# Patient Record
Sex: FEMALE | Race: BLACK OR AFRICAN AMERICAN | NOT HISPANIC OR LATINO | Employment: UNEMPLOYED | URBAN - METROPOLITAN AREA
[De-identification: names, ages, dates, MRNs, and addresses within clinical notes are randomized per-mention and may not be internally consistent; named-entity substitution may affect disease eponyms.]

---

## 2017-11-27 ENCOUNTER — HOSPITAL ENCOUNTER (EMERGENCY)
Facility: OTHER | Age: 27
Discharge: HOME | End: 2017-11-27
Attending: EMERGENCY MEDICINE
Payer: MEDICAID

## 2017-11-27 VITALS
HEIGHT: 62 IN | OXYGEN SATURATION: 99 % | RESPIRATION RATE: 18 BRPM | WEIGHT: 180 LBS | SYSTOLIC BLOOD PRESSURE: 112 MMHG | BODY MASS INDEX: 33.13 KG/M2 | HEART RATE: 92 BPM | DIASTOLIC BLOOD PRESSURE: 69 MMHG | TEMPERATURE: 98 F

## 2017-11-27 DIAGNOSIS — R55 VASOVAGAL SYNCOPE: ICD-10-CM

## 2017-11-27 DIAGNOSIS — R40.20 LOC (LOSS OF CONSCIOUSNESS): ICD-10-CM

## 2017-11-27 LAB
ALBUMIN SERPL BCP-MCNC: 4 G/DL
ALP SERPL-CCNC: 54 U/L
ALT SERPL W/O P-5'-P-CCNC: 11 U/L
ANION GAP SERPL CALC-SCNC: 7 MMOL/L
AST SERPL-CCNC: 11 U/L
B-HCG UR QL: NEGATIVE
BASOPHILS # BLD AUTO: 0.02 K/UL
BASOPHILS NFR BLD: 0.3 %
BILIRUB SERPL-MCNC: 0.3 MG/DL
BILIRUB UR QL STRIP: NEGATIVE
BUN SERPL-MCNC: 22 MG/DL
CALCIUM SERPL-MCNC: 9.2 MG/DL
CHLORIDE SERPL-SCNC: 106 MMOL/L
CLARITY UR: CLEAR
CO2 SERPL-SCNC: 24 MMOL/L
COLOR UR: YELLOW
CREAT SERPL-MCNC: 0.9 MG/DL
CTP QC/QA: YES
DIFFERENTIAL METHOD: ABNORMAL
EOSINOPHIL # BLD AUTO: 0 K/UL
EOSINOPHIL NFR BLD: 0.5 %
ERYTHROCYTE [DISTWIDTH] IN BLOOD BY AUTOMATED COUNT: 13.4 %
EST. GFR  (AFRICAN AMERICAN): >60 ML/MIN/1.73 M^2
EST. GFR  (NON AFRICAN AMERICAN): >60 ML/MIN/1.73 M^2
GLUCOSE SERPL-MCNC: 85 MG/DL
GLUCOSE UR QL STRIP: NEGATIVE
HCG INTACT+B SERPL-ACNC: <1.2 MIU/ML
HCT VFR BLD AUTO: 36.6 %
HGB BLD-MCNC: 12.5 G/DL
HGB UR QL STRIP: NEGATIVE
KETONES UR QL STRIP: NEGATIVE
LEUKOCYTE ESTERASE UR QL STRIP: NEGATIVE
LYMPHOCYTES # BLD AUTO: 3.1 K/UL
LYMPHOCYTES NFR BLD: 39.6 %
MCH RBC QN AUTO: 32.1 PG
MCHC RBC AUTO-ENTMCNC: 34.2 G/DL
MCV RBC AUTO: 94 FL
MONOCYTES # BLD AUTO: 0.6 K/UL
MONOCYTES NFR BLD: 7.8 %
NEUTROPHILS # BLD AUTO: 4 K/UL
NEUTROPHILS NFR BLD: 51.5 %
NITRITE UR QL STRIP: NEGATIVE
PH UR STRIP: 7 [PH] (ref 5–8)
PLATELET # BLD AUTO: 172 K/UL
PMV BLD AUTO: 11.4 FL
POCT GLUCOSE: 68 MG/DL (ref 70–110)
POTASSIUM SERPL-SCNC: 4.3 MMOL/L
PROT SERPL-MCNC: 7.4 G/DL
PROT UR QL STRIP: NEGATIVE
RBC # BLD AUTO: 3.9 M/UL
SODIUM SERPL-SCNC: 137 MMOL/L
SP GR UR STRIP: <=1.005 (ref 1–1.03)
URN SPEC COLLECT METH UR: ABNORMAL
UROBILINOGEN UR STRIP-ACNC: NEGATIVE EU/DL
WBC # BLD AUTO: 7.71 K/UL

## 2017-11-27 PROCEDURE — 80053 COMPREHEN METABOLIC PANEL: CPT

## 2017-11-27 PROCEDURE — 81003 URINALYSIS AUTO W/O SCOPE: CPT

## 2017-11-27 PROCEDURE — 96360 HYDRATION IV INFUSION INIT: CPT

## 2017-11-27 PROCEDURE — 99283 EMERGENCY DEPT VISIT LOW MDM: CPT | Mod: 25

## 2017-11-27 PROCEDURE — 85025 COMPLETE CBC W/AUTO DIFF WBC: CPT

## 2017-11-27 PROCEDURE — 84702 CHORIONIC GONADOTROPIN TEST: CPT

## 2017-11-27 PROCEDURE — 82962 GLUCOSE BLOOD TEST: CPT

## 2017-11-27 PROCEDURE — 93005 ELECTROCARDIOGRAM TRACING: CPT

## 2017-11-27 PROCEDURE — 93010 ELECTROCARDIOGRAM REPORT: CPT | Mod: ,,, | Performed by: INTERNAL MEDICINE

## 2017-11-27 PROCEDURE — 81025 URINE PREGNANCY TEST: CPT | Performed by: EMERGENCY MEDICINE

## 2017-11-27 PROCEDURE — 25000003 PHARM REV CODE 250: Performed by: EMERGENCY MEDICINE

## 2017-11-27 RX ADMIN — SODIUM CHLORIDE 1000 ML: 0.9 INJECTION, SOLUTION INTRAVENOUS at 01:11

## 2017-11-27 NOTE — ED PROVIDER NOTES
"Encounter Date: 11/27/2017    SCRIBE #1 NOTE: I, Ericklaci Muna, am scribing for, and in the presence of, Dr. Mondragon.       History     Chief Complaint   Patient presents with    Loss of Consciousness     Patient was at work making a Big Mac and the next thing she remembers she woke up on the floor.  Patient c/o a headache and back pain     Time seen by provider: 2:51 AM    This is a 27 y.o. female who presents with complaint of  LOC that occurred PTA. 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 She reports "feeling hot" just prior to the event. She denies prodromal chest pain, palpitations, or headache. She currently feels well. She denies recent illness, change in appetite, GI/URI/ symptoms  Or additional complaints. She notes previous syncopal episodes during the summertime. She admits to not drinking or eating much today while at work.    Additional past medical, surgical, and social history as outlined in the nursing assessment was reviewed by me.      The history is provided by the patient.     Review of patient's allergies indicates:   Allergen Reactions    Codeine      History reviewed. No pertinent past medical history.  History reviewed. No pertinent surgical history.  No family history on file.  Social History   Substance Use Topics    Smoking status: Never Smoker    Smokeless tobacco: Not on file    Alcohol use No     Review of Systems   Constitutional: Negative for appetite change, chills and fever.   HENT: Negative for congestion, rhinorrhea and sore throat.    Respiratory: Negative for cough and shortness of breath.    Cardiovascular: Negative for chest pain.   Gastrointestinal: Negative for abdominal pain, diarrhea, nausea and vomiting.   Endocrine: Negative for polyuria.   Genitourinary: Negative for decreased urine volume, difficulty urinating and dysuria.   Musculoskeletal: Negative for back pain.   Skin: Negative for rash.   Allergic/Immunologic: Negative for " immunocompromised state.   Neurological: Positive for syncope. Negative for dizziness and weakness.   Hematological: Does not bruise/bleed easily.   Psychiatric/Behavioral: Negative for confusion.       Physical Exam     Initial Vitals [11/27/17 0049]   BP Pulse Resp Temp SpO2   127/72 93 16 98.1 °F (36.7 °C) 99 %      MAP       90.33         Physical Exam    Constitutional: She appears well-developed and well-nourished. She is not diaphoretic. No distress.   HENT:   Head: Normocephalic and atraumatic. Head is without raccoon's eyes and without Dawn's sign.   Right Ear: External ear normal. No hemotympanum.   Left Ear: External ear normal. No hemotympanum.   Nose: No nasal deformity.   Mouth/Throat: Oropharynx is clear and moist.   Eyes: EOM are normal. Pupils are equal, round, and reactive to light. Right eye exhibits no discharge. Left eye exhibits no discharge.   Neck: Normal range of motion. Neck supple. No stridor present. No spinous process tenderness present. No tracheal deviation present.   Cardiovascular: Normal rate, regular rhythm, S1 normal, S2 normal, normal heart sounds and intact distal pulses. Exam reveals no gallop and no friction rub.    No murmur heard.  Pulmonary/Chest: Breath sounds normal. No respiratory distress. She has no wheezes. She has no rhonchi. She has no rales. She exhibits no tenderness.   Abdominal: Soft. Bowel sounds are normal. She exhibits no distension and no mass. There is no tenderness. There is no rebound and no guarding.   Musculoskeletal: Normal range of motion. She exhibits no edema or tenderness.   Normal range of motion. No edema or tenderness.    Lymphadenopathy:     She has no cervical adenopathy.   Neurological: She is alert and oriented to person, place, and time. She has normal strength. No cranial nerve deficit or sensory deficit. She exhibits normal muscle tone. Gait normal. GCS eye subscore is 4. GCS verbal subscore is 5. GCS motor subscore is 6.   Skin: Skin  is warm and dry. Capillary refill takes less than 2 seconds. No laceration and no rash noted. No pallor.   Psychiatric: Her speech is normal. Thought content normal.         ED Course   Procedures  Labs Reviewed   CBC W/ AUTO DIFFERENTIAL - Abnormal; Notable for the following:        Result Value    RBC 3.90 (*)     Hematocrit 36.6 (*)     MCH 32.1 (*)     All other components within normal limits   COMPREHENSIVE METABOLIC PANEL - Abnormal; Notable for the following:     BUN, Bld 22 (*)     Alkaline Phosphatase 54 (*)     Anion Gap 7 (*)     All other components within normal limits   URINALYSIS - Abnormal; Notable for the following:     Specific Gravity, UA <=1.005 (*)     All other components within normal limits   POCT GLUCOSE - Abnormal; Notable for the following:     POCT Glucose 68 (*)     All other components within normal limits   HCG, QUANTITATIVE, PREGNANCY   HCG, QUANTITATIVE, PREGNANCY   POCT URINE PREGNANCY     EKG Readings: (Independently Interpreted)   Normal sinus rhythm with a rate of 80 bpm. Baseline motion artifact. Non-specific T-wave changes.           Medical Decision Making:   Initial Assessment:   Patient presents after syncopal event. Her EKG shows no acute ischemia, lethal dysrhythmia, or conduction delay. I will check electrolytes, hemoglobin, and renal function. I will obtain orthostatics and give IV fluids. I will reassess.  Clinical Tests:   Lab Tests: Ordered  Medical Tests: Ordered and Reviewed  ED Management:  3:02 AM pt feels well. Her glucose was 68; she has no history of DM or prior bouts of hypoglycemia. She had no signs of neuroglycopenia and no glucose in her urine. I stressed the importance of hydration and not skipping meals. She wants to go home. I have discussed with patient the diagnostic results, diagnosis, treatment plan, and need for follow-up. Patient has expressed understanding of my instructions. I am comfortable with her discharge home at this time.                 Attending Attestation:           Physician Attestation for Scribe:  Physician Attestation Statement for Scribe #1: I, Dr. Mondragon , reviewed documentation, as scribed by Isela Toro  in my presence, and it is both accurate and complete.                 ED Course      Clinical Impression:     1. LOC (loss of consciousness)    2. Vasovagal syncope                                 Mackenzie Mondragon MD  11/28/17 2002

## 2017-11-27 NOTE — ED NOTES
Pt presents to the ed via EMS with c/o syncope. Pt reports she was at work making a [employer] when she felt a hotness come over her, then pt reports she woke ip on the floor. Pt denies any N/V, dizziness. Pt does report she is now experiencing mid back pain. Pain 4/10. Pt  Reports that she has passed out before, usually during the summer. Pt does not appear to be in in acute distress. Respirations are even and unlabored. Pt is placed on cont cardiac, bp and pulse ox monitoring. Bed is locked and in lowest position with side rails up x2. Call light is within reach.

## 2018-03-30 ENCOUNTER — HOSPITAL ENCOUNTER (EMERGENCY)
Facility: OTHER | Age: 28
Discharge: HOME | End: 2018-03-30
Attending: EMERGENCY MEDICINE
Payer: MEDICAID

## 2018-03-30 VITALS
WEIGHT: 171 LBS | RESPIRATION RATE: 17 BRPM | HEIGHT: 61 IN | HEART RATE: 96 BPM | TEMPERATURE: 99 F | DIASTOLIC BLOOD PRESSURE: 74 MMHG | BODY MASS INDEX: 32.28 KG/M2 | SYSTOLIC BLOOD PRESSURE: 124 MMHG | OXYGEN SATURATION: 100 %

## 2018-03-30 DIAGNOSIS — R11.2 NAUSEA AND VOMITING, INTRACTABILITY OF VOMITING NOT SPECIFIED, UNSPECIFIED VOMITING TYPE: ICD-10-CM

## 2018-03-30 DIAGNOSIS — E83.52 HYPERCALCEMIA: ICD-10-CM

## 2018-03-30 DIAGNOSIS — E86.0 DEHYDRATION: ICD-10-CM

## 2018-03-30 DIAGNOSIS — O21.0 HYPEREMESIS GRAVIDARUM, ANTEPARTUM: Primary | ICD-10-CM

## 2018-03-30 LAB
ALBUMIN SERPL BCP-MCNC: 4.8 G/DL
ALP SERPL-CCNC: 49 U/L
ALT SERPL W/O P-5'-P-CCNC: 24 U/L
ANION GAP SERPL CALC-SCNC: 15 MMOL/L
AST SERPL-CCNC: 21 U/L
B-HCG UR QL: POSITIVE
BACTERIA #/AREA URNS HPF: ABNORMAL /HPF
BASOPHILS # BLD AUTO: 0 K/UL
BASOPHILS NFR BLD: 0 %
BILIRUB SERPL-MCNC: 1 MG/DL
BILIRUB UR QL STRIP: ABNORMAL
BUN SERPL-MCNC: 13 MG/DL
CALCIUM SERPL-MCNC: 10.6 MG/DL
CHLORIDE SERPL-SCNC: 100 MMOL/L
CLARITY UR: ABNORMAL
CO2 SERPL-SCNC: 19 MMOL/L
COLOR UR: YELLOW
CREAT SERPL-MCNC: 0.7 MG/DL
CTP QC/QA: YES
DIFFERENTIAL METHOD: ABNORMAL
EOSINOPHIL # BLD AUTO: 0 K/UL
EOSINOPHIL NFR BLD: 0.1 %
ERYTHROCYTE [DISTWIDTH] IN BLOOD BY AUTOMATED COUNT: 12.4 %
EST. GFR  (AFRICAN AMERICAN): >60 ML/MIN/1.73 M^2
EST. GFR  (NON AFRICAN AMERICAN): >60 ML/MIN/1.73 M^2
GLUCOSE SERPL-MCNC: 81 MG/DL
GLUCOSE UR QL STRIP: NEGATIVE
HCT VFR BLD AUTO: 41.1 %
HGB BLD-MCNC: 14.8 G/DL
HGB UR QL STRIP: ABNORMAL
HYALINE CASTS #/AREA URNS LPF: 0 /LPF
KETONES UR QL STRIP: ABNORMAL
LEUKOCYTE ESTERASE UR QL STRIP: NEGATIVE
LYMPHOCYTES # BLD AUTO: 1.7 K/UL
LYMPHOCYTES NFR BLD: 24.5 %
MCH RBC QN AUTO: 32.2 PG
MCHC RBC AUTO-ENTMCNC: 36 G/DL
MCV RBC AUTO: 90 FL
MICROSCOPIC COMMENT: ABNORMAL
MONOCYTES # BLD AUTO: 0.5 K/UL
MONOCYTES NFR BLD: 6.6 %
NEUTROPHILS # BLD AUTO: 4.7 K/UL
NEUTROPHILS NFR BLD: 68.7 %
NITRITE UR QL STRIP: NEGATIVE
PH UR STRIP: 6 [PH] (ref 5–8)
PLATELET # BLD AUTO: 241 K/UL
PMV BLD AUTO: 10.6 FL
POTASSIUM SERPL-SCNC: 3.1 MMOL/L
PROT SERPL-MCNC: 8.7 G/DL
PROT UR QL STRIP: ABNORMAL
RBC # BLD AUTO: 4.59 M/UL
RBC #/AREA URNS HPF: 4 /HPF (ref 0–4)
SODIUM SERPL-SCNC: 134 MMOL/L
SP GR UR STRIP: >=1.03 (ref 1–1.03)
SQUAMOUS #/AREA URNS HPF: 69 /HPF
URN SPEC COLLECT METH UR: ABNORMAL
UROBILINOGEN UR STRIP-ACNC: NEGATIVE EU/DL
WBC # BLD AUTO: 6.83 K/UL
WBC #/AREA URNS HPF: 6 /HPF (ref 0–5)
YEAST URNS QL MICRO: ABNORMAL

## 2018-03-30 PROCEDURE — 25000003 PHARM REV CODE 250: Performed by: EMERGENCY MEDICINE

## 2018-03-30 PROCEDURE — 63600175 PHARM REV CODE 636 W HCPCS: Performed by: EMERGENCY MEDICINE

## 2018-03-30 PROCEDURE — 81000 URINALYSIS NONAUTO W/SCOPE: CPT

## 2018-03-30 PROCEDURE — 81025 URINE PREGNANCY TEST: CPT | Performed by: EMERGENCY MEDICINE

## 2018-03-30 PROCEDURE — 96365 THER/PROPH/DIAG IV INF INIT: CPT

## 2018-03-30 PROCEDURE — 80053 COMPREHEN METABOLIC PANEL: CPT

## 2018-03-30 PROCEDURE — 85025 COMPLETE CBC W/AUTO DIFF WBC: CPT

## 2018-03-30 PROCEDURE — S0028 INJECTION, FAMOTIDINE, 20 MG: HCPCS | Performed by: EMERGENCY MEDICINE

## 2018-03-30 PROCEDURE — 96375 TX/PRO/DX INJ NEW DRUG ADDON: CPT

## 2018-03-30 PROCEDURE — 96368 THER/DIAG CONCURRENT INF: CPT

## 2018-03-30 PROCEDURE — 99284 EMERGENCY DEPT VISIT MOD MDM: CPT | Mod: 25

## 2018-03-30 RX ORDER — DOXYLAMINE SUCCINATE AND PYRIDOXINE HYDROCHLORIDE, DELAYED RELEASE TABLETS 10 MG/10 MG 10; 10 MG/1; MG/1
2 TABLET, DELAYED RELEASE ORAL NIGHTLY
Qty: 30 TABLET | Refills: 0 | Status: ON HOLD | OUTPATIENT
Start: 2018-03-30 | End: 2018-04-10 | Stop reason: HOSPADM

## 2018-03-30 RX ORDER — ONDANSETRON 2 MG/ML
8 INJECTION INTRAMUSCULAR; INTRAVENOUS ONCE
Status: COMPLETED | OUTPATIENT
Start: 2018-03-30 | End: 2018-03-30

## 2018-03-30 RX ORDER — NITROFURANTOIN 25; 75 MG/1; MG/1
100 CAPSULE ORAL 2 TIMES DAILY
Qty: 10 CAPSULE | Refills: 0 | Status: SHIPPED | OUTPATIENT
Start: 2018-03-30 | End: 2018-03-30

## 2018-03-30 RX ORDER — DOXYLAMINE SUCCINATE AND PYRIDOXINE HYDROCHLORIDE, DELAYED RELEASE TABLETS 10 MG/10 MG 10; 10 MG/1; MG/1
2 TABLET, DELAYED RELEASE ORAL NIGHTLY
Qty: 30 TABLET | Refills: 0 | Status: SHIPPED | OUTPATIENT
Start: 2018-03-30 | End: 2018-03-30

## 2018-03-30 RX ORDER — PROMETHAZINE HYDROCHLORIDE 25 MG/1
25 SUPPOSITORY RECTAL EVERY 6 HOURS PRN
Qty: 12 SUPPOSITORY | Refills: 0 | Status: ON HOLD | OUTPATIENT
Start: 2018-03-30 | End: 2018-04-10 | Stop reason: HOSPADM

## 2018-03-30 RX ORDER — SUCRALFATE 1 G/10ML
1 SUSPENSION ORAL 4 TIMES DAILY
Qty: 1 BOTTLE | Refills: 0 | Status: ON HOLD | OUTPATIENT
Start: 2018-03-30 | End: 2018-04-10 | Stop reason: HOSPADM

## 2018-03-30 RX ORDER — DEXTROSE MONOHYDRATE, SODIUM CHLORIDE, AND POTASSIUM CHLORIDE 50; .745; 4.5 G/1000ML; G/1000ML; G/1000ML
1000 INJECTION, SOLUTION INTRAVENOUS
Status: COMPLETED | OUTPATIENT
Start: 2018-03-30 | End: 2018-03-30

## 2018-03-30 RX ORDER — NITROFURANTOIN 25; 75 MG/1; MG/1
100 CAPSULE ORAL 2 TIMES DAILY
Qty: 10 CAPSULE | Refills: 0 | Status: SHIPPED | OUTPATIENT
Start: 2018-03-30 | End: 2018-04-04

## 2018-03-30 RX ORDER — FAMOTIDINE 10 MG/ML
20 INJECTION INTRAVENOUS
Status: COMPLETED | OUTPATIENT
Start: 2018-03-30 | End: 2018-03-30

## 2018-03-30 RX ORDER — FAMOTIDINE 20 MG/1
10 TABLET, FILM COATED ORAL 2 TIMES DAILY PRN
Qty: 20 TABLET | Refills: 0 | Status: ON HOLD | OUTPATIENT
Start: 2018-03-30 | End: 2018-04-10 | Stop reason: HOSPADM

## 2018-03-30 RX ADMIN — SODIUM CHLORIDE 1000 ML: 0.9 INJECTION, SOLUTION INTRAVENOUS at 05:03

## 2018-03-30 RX ADMIN — DEXTROSE, SODIUM CHLORIDE, AND POTASSIUM CHLORIDE 1000 ML: 5; .45; .075 INJECTION INTRAVENOUS at 06:03

## 2018-03-30 RX ADMIN — PYRIDOXINE HYDROCHLORIDE 25 MG: 100 INJECTION, SOLUTION INTRAMUSCULAR; INTRAVENOUS at 06:03

## 2018-03-30 RX ADMIN — FAMOTIDINE 20 MG: 10 INJECTION INTRAVENOUS at 05:03

## 2018-03-30 RX ADMIN — ONDANSETRON HYDROCHLORIDE 8 MG: 2 INJECTION INTRAMUSCULAR; INTRAVENOUS at 05:03

## 2018-03-30 NOTE — ED NOTES
Pt rounding complete. Pt given warm blanket. Pt does not appear to be in acute distress. Respirations are even and unlabored. VSS. Bed is locked and in lowest position with side rails up x2. Call light is within reach. Will continue to monitor.

## 2018-03-30 NOTE — ED NOTES
Pt escorted to room 7 by triage nurse via wheelchair. Emesis noted to bag, clear/yellow with scant amount of dark blood.

## 2018-03-30 NOTE — ED NOTES
"Pt reports increased fatigue and weakness x 1 week. Initially bloody streaks in emesis "i thought it was just throwing up so much", "now just blood". Pt provided emesis bag in triage. Wretching but no active vomiting. Pt in direct view of triage nurse.   "

## 2018-03-30 NOTE — ED NOTES
Received report from FRAN Rodriguez, at bedside.  Pt is connected to NIBP cuff and pulse ox. Fluids infusing per MAR.   Bed in lowest position, side rails up x1, call light in reach, pt instructed to call for assistance.

## 2018-03-31 NOTE — ED NOTES
Pt presents with c/o emesis x2 weeks. Pt is 9 weeks pregnant. Pt denies any dizziness or chest pain. Pt is placed on cont cardiac, bp and pulse ox monitoring. Bed is locked and in lowest position with side rails up x2. Call light is within reach.

## 2018-04-06 ENCOUNTER — HOSPITAL ENCOUNTER (INPATIENT)
Facility: OTHER | Age: 28
LOS: 2 days | Discharge: HOME | End: 2018-04-10
Attending: EMERGENCY MEDICINE | Admitting: OBSTETRICS & GYNECOLOGY
Payer: MEDICAID

## 2018-04-06 DIAGNOSIS — O21.0 HYPEREMESIS GRAVIDARUM: Primary | ICD-10-CM

## 2018-04-06 DIAGNOSIS — R07.9 CHEST PAIN: ICD-10-CM

## 2018-04-06 PROBLEM — O20.9 VAGINAL BLEEDING AFFECTING EARLY PREGNANCY: Status: ACTIVE | Noted: 2018-04-06

## 2018-04-06 PROBLEM — Z34.91 FIRST TRIMESTER PREGNANCY: Status: ACTIVE | Noted: 2018-04-06

## 2018-04-06 PROBLEM — O23.41 UTI (URINARY TRACT INFECTION) DURING PREGNANCY, FIRST TRIMESTER: Status: ACTIVE | Noted: 2018-04-06

## 2018-04-06 PROBLEM — A59.9 TRICHIMONIASIS: Status: ACTIVE | Noted: 2018-04-06

## 2018-04-06 LAB
ANION GAP SERPL CALC-SCNC: 20 MMOL/L
ANION GAP SERPL CALC-SCNC: 7 MMOL/L
ANION GAP SERPL CALC-SCNC: 7 MMOL/L
B-OH-BUTYR BLD STRIP-SCNC: 3.8 MMOL/L
BACTERIA #/AREA URNS HPF: ABNORMAL /HPF
BILIRUB UR QL STRIP: NEGATIVE
BUN SERPL-MCNC: 16 MG/DL
BUN SERPL-MCNC: 22 MG/DL
BUN SERPL-MCNC: 9 MG/DL
C TRACH DNA SPEC QL NAA+PROBE: NOT DETECTED
CALCIUM SERPL-MCNC: 12 MG/DL
CALCIUM SERPL-MCNC: 8.7 MG/DL
CALCIUM SERPL-MCNC: 8.9 MG/DL
CHLORIDE SERPL-SCNC: 104 MMOL/L
CHLORIDE SERPL-SCNC: 112 MMOL/L
CHLORIDE SERPL-SCNC: 98 MMOL/L
CLARITY UR: CLEAR
CO2 SERPL-SCNC: 16 MMOL/L
CO2 SERPL-SCNC: 19 MMOL/L
CO2 SERPL-SCNC: 22 MMOL/L
COLOR UR: YELLOW
CREAT SERPL-MCNC: 0.7 MG/DL
CREAT SERPL-MCNC: 0.8 MG/DL
CREAT SERPL-MCNC: 0.9 MG/DL
EST. GFR  (AFRICAN AMERICAN): >60 ML/MIN/1.73 M^2
EST. GFR  (NON AFRICAN AMERICAN): >60 ML/MIN/1.73 M^2
GLUCOSE SERPL-MCNC: 105 MG/DL
GLUCOSE SERPL-MCNC: 317 MG/DL
GLUCOSE SERPL-MCNC: 96 MG/DL
GLUCOSE UR QL STRIP: ABNORMAL
HGB UR QL STRIP: ABNORMAL
KETONES UR QL STRIP: ABNORMAL
LEUKOCYTE ESTERASE UR QL STRIP: NEGATIVE
MAGNESIUM SERPL-MCNC: 2.2 MG/DL
MICROSCOPIC COMMENT: ABNORMAL
N GONORRHOEA DNA SPEC QL NAA+PROBE: NOT DETECTED
NITRITE UR QL STRIP: NEGATIVE
PH UR STRIP: 6 [PH] (ref 5–8)
PHOSPHATE SERPL-MCNC: 3.7 MG/DL
POTASSIUM SERPL-SCNC: 3 MMOL/L
POTASSIUM SERPL-SCNC: 3 MMOL/L
POTASSIUM SERPL-SCNC: 3.8 MMOL/L
PROT UR QL STRIP: NEGATIVE
RBC #/AREA URNS HPF: 4 /HPF (ref 0–4)
SODIUM SERPL-SCNC: 133 MMOL/L
SODIUM SERPL-SCNC: 134 MMOL/L
SODIUM SERPL-SCNC: 138 MMOL/L
SP GR UR STRIP: 1.01 (ref 1–1.03)
SQUAMOUS #/AREA URNS HPF: 6 /HPF
TRICHOMONAS UR QL MICRO: ABNORMAL
URN SPEC COLLECT METH UR: ABNORMAL
UROBILINOGEN UR STRIP-ACNC: NEGATIVE EU/DL
WBC #/AREA URNS HPF: 2 /HPF (ref 0–5)
YEAST URNS QL MICRO: ABNORMAL

## 2018-04-06 PROCEDURE — G0378 HOSPITAL OBSERVATION PER HR: HCPCS

## 2018-04-06 PROCEDURE — 63600175 PHARM REV CODE 636 W HCPCS: Performed by: EMERGENCY MEDICINE

## 2018-04-06 PROCEDURE — S0030 INJECTION, METRONIDAZOLE: HCPCS | Performed by: STUDENT IN AN ORGANIZED HEALTH CARE EDUCATION/TRAINING PROGRAM

## 2018-04-06 PROCEDURE — 25000003 PHARM REV CODE 250: Performed by: STUDENT IN AN ORGANIZED HEALTH CARE EDUCATION/TRAINING PROGRAM

## 2018-04-06 PROCEDURE — 63600175 PHARM REV CODE 636 W HCPCS: Performed by: OBSTETRICS & GYNECOLOGY

## 2018-04-06 PROCEDURE — 84100 ASSAY OF PHOSPHORUS: CPT

## 2018-04-06 PROCEDURE — 63600175 PHARM REV CODE 636 W HCPCS: Performed by: STUDENT IN AN ORGANIZED HEALTH CARE EDUCATION/TRAINING PROGRAM

## 2018-04-06 PROCEDURE — 81000 URINALYSIS NONAUTO W/SCOPE: CPT

## 2018-04-06 PROCEDURE — 87491 CHLMYD TRACH DNA AMP PROBE: CPT | Mod: 91

## 2018-04-06 PROCEDURE — 36415 COLL VENOUS BLD VENIPUNCTURE: CPT

## 2018-04-06 PROCEDURE — 83735 ASSAY OF MAGNESIUM: CPT

## 2018-04-06 PROCEDURE — 96375 TX/PRO/DX INJ NEW DRUG ADDON: CPT

## 2018-04-06 PROCEDURE — 86703 HIV-1/HIV-2 1 RESULT ANTBDY: CPT

## 2018-04-06 PROCEDURE — 86850 RBC ANTIBODY SCREEN: CPT

## 2018-04-06 PROCEDURE — 86592 SYPHILIS TEST NON-TREP QUAL: CPT

## 2018-04-06 PROCEDURE — 63700000 PHARM REV CODE 250 ALT 637 W/O HCPCS: Performed by: STUDENT IN AN ORGANIZED HEALTH CARE EDUCATION/TRAINING PROGRAM

## 2018-04-06 PROCEDURE — 80074 ACUTE HEPATITIS PANEL: CPT

## 2018-04-06 PROCEDURE — 99285 EMERGENCY DEPT VISIT HI MDM: CPT | Mod: 25

## 2018-04-06 PROCEDURE — 25000003 PHARM REV CODE 250: Performed by: EMERGENCY MEDICINE

## 2018-04-06 PROCEDURE — 87491 CHLMYD TRACH DNA AMP PROBE: CPT

## 2018-04-06 PROCEDURE — 96361 HYDRATE IV INFUSION ADD-ON: CPT

## 2018-04-06 PROCEDURE — 87086 URINE CULTURE/COLONY COUNT: CPT

## 2018-04-06 PROCEDURE — 80048 BASIC METABOLIC PNL TOTAL CA: CPT | Mod: 91

## 2018-04-06 PROCEDURE — 96365 THER/PROPH/DIAG IV INF INIT: CPT

## 2018-04-06 PROCEDURE — 82010 KETONE BODYS QUAN: CPT

## 2018-04-06 RX ORDER — POTASSIUM CHLORIDE 20 MEQ/1
40 TABLET, EXTENDED RELEASE ORAL
Status: COMPLETED | OUTPATIENT
Start: 2018-04-06 | End: 2018-04-06

## 2018-04-06 RX ORDER — METOCLOPRAMIDE HYDROCHLORIDE 5 MG/ML
10 INJECTION INTRAMUSCULAR; INTRAVENOUS
Status: COMPLETED | OUTPATIENT
Start: 2018-04-06 | End: 2018-04-06

## 2018-04-06 RX ORDER — CALCIUM CARBONATE 200(500)MG
500 TABLET,CHEWABLE ORAL 3 TIMES DAILY PRN
Status: DISCONTINUED | OUTPATIENT
Start: 2018-04-06 | End: 2018-04-10 | Stop reason: HOSPADM

## 2018-04-06 RX ORDER — ONDANSETRON 2 MG/ML
8 INJECTION INTRAMUSCULAR; INTRAVENOUS
Status: COMPLETED | OUTPATIENT
Start: 2018-04-06 | End: 2018-04-06

## 2018-04-06 RX ORDER — DIPHENHYDRAMINE HYDROCHLORIDE 50 MG/ML
25 INJECTION INTRAMUSCULAR; INTRAVENOUS EVERY 4 HOURS PRN
Status: DISCONTINUED | OUTPATIENT
Start: 2018-04-06 | End: 2018-04-10

## 2018-04-06 RX ORDER — AMOXICILLIN 250 MG
1 CAPSULE ORAL NIGHTLY PRN
Status: DISCONTINUED | OUTPATIENT
Start: 2018-04-06 | End: 2018-04-09

## 2018-04-06 RX ORDER — METOCLOPRAMIDE HYDROCHLORIDE 5 MG/ML
10 INJECTION INTRAMUSCULAR; INTRAVENOUS EVERY 6 HOURS PRN
Status: DISCONTINUED | OUTPATIENT
Start: 2018-04-06 | End: 2018-04-07

## 2018-04-06 RX ORDER — DEXTROSE MONOHYDRATE, SODIUM CHLORIDE, AND POTASSIUM CHLORIDE 50; 1.49; 9 G/1000ML; G/1000ML; G/1000ML
INJECTION, SOLUTION INTRAVENOUS CONTINUOUS
Status: DISCONTINUED | OUTPATIENT
Start: 2018-04-06 | End: 2018-04-10

## 2018-04-06 RX ORDER — SODIUM CHLORIDE 9 MG/ML
1000 INJECTION, SOLUTION INTRAVENOUS
Status: COMPLETED | OUTPATIENT
Start: 2018-04-06 | End: 2018-04-06

## 2018-04-06 RX ORDER — SIMETHICONE 80 MG
1 TABLET,CHEWABLE ORAL EVERY 6 HOURS PRN
Status: DISCONTINUED | OUTPATIENT
Start: 2018-04-06 | End: 2018-04-10 | Stop reason: HOSPADM

## 2018-04-06 RX ORDER — POTASSIUM CHLORIDE 7.45 MG/ML
10 INJECTION INTRAVENOUS
Status: DISPENSED | OUTPATIENT
Start: 2018-04-06 | End: 2018-04-06

## 2018-04-06 RX ORDER — DIPHENHYDRAMINE HYDROCHLORIDE 50 MG/ML
25 INJECTION INTRAMUSCULAR; INTRAVENOUS
Status: COMPLETED | OUTPATIENT
Start: 2018-04-06 | End: 2018-04-06

## 2018-04-06 RX ORDER — DIPHENHYDRAMINE HCL 25 MG
25 CAPSULE ORAL EVERY 4 HOURS PRN
Status: DISCONTINUED | OUTPATIENT
Start: 2018-04-06 | End: 2018-04-10 | Stop reason: HOSPADM

## 2018-04-06 RX ORDER — CEFTRIAXONE 1 G/1
1 INJECTION, POWDER, FOR SOLUTION INTRAMUSCULAR; INTRAVENOUS ONCE
Status: DISCONTINUED | OUTPATIENT
Start: 2018-04-06 | End: 2018-04-06

## 2018-04-06 RX ORDER — PRENATAL WITH FERROUS FUM AND FOLIC ACID 3080; 920; 120; 400; 22; 1.84; 3; 20; 10; 1; 12; 200; 27; 25; 2 [IU]/1; [IU]/1; MG/1; [IU]/1; MG/1; MG/1; MG/1; MG/1; MG/1; MG/1; UG/1; MG/1; MG/1; MG/1; MG/1
1 TABLET ORAL DAILY
Status: DISCONTINUED | OUTPATIENT
Start: 2018-04-06 | End: 2018-04-10 | Stop reason: HOSPADM

## 2018-04-06 RX ORDER — METRONIDAZOLE 500 MG/100ML
500 INJECTION, SOLUTION INTRAVENOUS
Status: COMPLETED | OUTPATIENT
Start: 2018-04-06 | End: 2018-04-06

## 2018-04-06 RX ADMIN — POTASSIUM CHLORIDE 10 MEQ: 10 INJECTION, SOLUTION INTRAVENOUS at 07:04

## 2018-04-06 RX ADMIN — DOXYLAMINE SUCCINATE 12.5 MG: 25 TABLET ORAL at 03:04

## 2018-04-06 RX ADMIN — METRONIDAZOLE 500 MG: 500 INJECTION, SOLUTION INTRAVENOUS at 06:04

## 2018-04-06 RX ADMIN — PYRIDOXINE HYDROCHLORIDE 25 MG: 100 INJECTION, SOLUTION INTRAMUSCULAR; INTRAVENOUS at 06:04

## 2018-04-06 RX ADMIN — METRONIDAZOLE 500 MG: 500 INJECTION, SOLUTION INTRAVENOUS at 05:04

## 2018-04-06 RX ADMIN — DEXTROSE AND SODIUM CHLORIDE 1000 ML: 5; .9 INJECTION, SOLUTION INTRAVENOUS at 11:04

## 2018-04-06 RX ADMIN — DEXTROSE AND SODIUM CHLORIDE 2000 ML: 5; .9 INJECTION, SOLUTION INTRAVENOUS at 09:04

## 2018-04-06 RX ADMIN — POTASSIUM CHLORIDE 10 MEQ: 10 INJECTION, SOLUTION INTRAVENOUS at 04:04

## 2018-04-06 RX ADMIN — PROMETHAZINE HYDROCHLORIDE 25 MG: 25 INJECTION INTRAMUSCULAR; INTRAVENOUS at 09:04

## 2018-04-06 RX ADMIN — SODIUM CHLORIDE 1000 ML: 0.9 INJECTION, SOLUTION INTRAVENOUS at 02:04

## 2018-04-06 RX ADMIN — CEFTRIAXONE 1 G: 1 INJECTION, SOLUTION INTRAVENOUS at 06:04

## 2018-04-06 RX ADMIN — POTASSIUM CHLORIDE 10 MEQ: 10 INJECTION, SOLUTION INTRAVENOUS at 06:04

## 2018-04-06 RX ADMIN — DOXYLAMINE SUCCINATE 12.5 MG: 25 TABLET ORAL at 09:04

## 2018-04-06 RX ADMIN — POTASSIUM CHLORIDE 10 MEQ: 10 INJECTION, SOLUTION INTRAVENOUS at 03:04

## 2018-04-06 RX ADMIN — DIPHENHYDRAMINE HYDROCHLORIDE 25 MG: 50 INJECTION, SOLUTION INTRAMUSCULAR; INTRAVENOUS at 09:04

## 2018-04-06 RX ADMIN — METRONIDAZOLE 500 MG: 500 INJECTION, SOLUTION INTRAVENOUS at 04:04

## 2018-04-06 RX ADMIN — METOCLOPRAMIDE 10 MG: 5 INJECTION, SOLUTION INTRAMUSCULAR; INTRAVENOUS at 09:04

## 2018-04-06 RX ADMIN — PRENATAL VIT W/ FE FUMARATE-FA TAB 27-0.8 MG 1 TABLET: 27-0.8 TAB at 03:04

## 2018-04-06 RX ADMIN — PYRIDOXINE HYDROCHLORIDE 25 MG: 100 INJECTION, SOLUTION INTRAMUSCULAR; INTRAVENOUS at 09:04

## 2018-04-06 RX ADMIN — DEXTROSE MONOHYDRATE, SODIUM CHLORIDE, AND POTASSIUM CHLORIDE: 50; 9; 1.49 INJECTION, SOLUTION INTRAVENOUS at 05:04

## 2018-04-06 RX ADMIN — POTASSIUM CHLORIDE 40 MEQ: 1500 TABLET, EXTENDED RELEASE ORAL at 02:04

## 2018-04-06 RX ADMIN — ONDANSETRON HYDROCHLORIDE 8 MG: 2 INJECTION, SOLUTION INTRAMUSCULAR; INTRAVENOUS at 02:04

## 2018-04-06 NOTE — ED NOTES
Pt is placed cont cardiac monitoring. Pt is AAOx3. RR are even and unlabored. Bed is locked and in lowest position with side rails up x2. Call light is within reach.

## 2018-04-06 NOTE — ASSESSMENT & PLAN NOTE
- On urine in ED - will discuss with patient  - Flagyl IV 2g  - GC/CT and HIV/RPR added on to urine/labs

## 2018-04-06 NOTE — SUBJECTIVE & OBJECTIVE
Obstetric History       T0      L0     SAB0   TAB0   Ectopic0   Multiple0   Live Births0       # Outcome Date GA Lbr Lan/2nd Weight Sex Delivery Anes PTL Lv   1 Current                 History reviewed. No pertinent past medical history.  History reviewed. No pertinent surgical history.      (Not in a hospital admission)    Review of patient's allergies indicates:   Allergen Reactions    Codeine         Family History     None        Social History Main Topics    Smoking status: Never Smoker    Smokeless tobacco: Not on file    Alcohol use No    Drug use: No    Sexual activity: Yes     Review of Systems   Constitutional: Positive for activity change, appetite change, fatigue and unexpected weight change. Negative for chills, diaphoresis and fever.   Respiratory: Negative for cough and shortness of breath.    Cardiovascular: Negative for chest pain.   Gastrointestinal: Positive for constipation, nausea and vomiting. Negative for abdominal pain, bloating and diarrhea.   Endocrine: Negative for diabetes.   Genitourinary: Positive for decreased libido. Negative for dyspareunia, flank pain, pelvic pain, vaginal bleeding, vaginal discharge, vaginal pain and vaginal odor.   Neurological: Positive for syncope. Negative for headaches.   Hematological: Does not bruise/bleed easily.   Breast: Positive for breast pain.     Objective:     Vital Signs (Most Recent):  Temp: 98 °F (36.7 °C) (18 1314)  Pulse: 100 (18 1241)  Resp: 18 (18 0819)  BP: (!) 150/90 (18 1241)  SpO2: 99 % (18 1241) Vital Signs (24h Range):  Temp:  [97.8 °F (36.6 °C)-98 °F (36.7 °C)] 98 °F (36.7 °C)  Pulse:  [] 100  Resp:  [18] 18  SpO2:  [97 %-99 %] 99 %  BP: (100-150)/(57-99) 150/90     Weight: 64 kg (141 lb)  Body mass index is 26.64 kg/m².    Physical Exam:   Constitutional: She is oriented to person, place, and time. She appears well-developed and well-nourished. No distress.    HENT:   Head:  Normocephalic and atraumatic.    Eyes: Conjunctivae and EOM are normal.    Neck: Neck supple.    Cardiovascular: Normal rate, regular rhythm and normal heart sounds.  Exam reveals no edema.     Pulmonary/Chest: Effort normal and breath sounds normal. No respiratory distress.        Abdominal: Soft. Bowel sounds are normal. She exhibits no distension. There is no tenderness. There is no rebound and no guarding.             Musculoskeletal: Normal range of motion.       Neurological: She is alert and oriented to person, place, and time.    Skin: Skin is warm and dry. She is not diaphoretic.    Psychiatric: She has a normal mood and affect. Her behavior is normal. Judgment and thought content normal.       Significant Labs:  No results found for: GROUPTRH, HEPBSAG, RUBELLAIGGSC, STREPBCULT, AFP, RLBVWHM4IX    BMP:   Recent Labs  Lab 04/06/18  0846 04/06/18  1236   GLU 96 317*   * 133*   K 3.8 3.0*   CL 98 104   CO2 16* 22*   BUN 22* 16   CREATININE 0.9 0.8   CALCIUM 12.0* 8.7   MG 2.2  --      CBC: No results for input(s): WBC, RBC, HGB, HCT, PLT, MCV, MCH, MCHC in the last 48 hours.    Recent Labs  Lab 04/06/18  1152   COLORU Yellow   SPECGRAV 1.010   PHUR 6.0   PROTEINUA Negative   BACTERIA Few*   NITRITE Negative   LEUKOCYTESUR Negative   UROBILINOGEN Negative     I have personallly reviewed all pertinent lab results from the last 24 hours.

## 2018-04-06 NOTE — ASSESSMENT & PLAN NOTE
- Diagnosed by OB at [facility] sx currently but was unable to tolerate PO medication  - Will treat with 1g rocephin IV x 1  - Will need ROGELIO

## 2018-04-06 NOTE — ED NOTES
Patient Identifiers for Daija Purvis checked and correct  LOC: The patient is awake, alert and aware of environment with an appropriate affect, the patient is oriented x 3 and speaking appropriate.  APPEARANCE: Patient resting comfortably and in no acute distress. The patient is clean and well groomed. The patient's clothing is properly fastened.  SKIN: The skin is warm and dry. The patient has normal skin turgor and moist mucus membranes. No rashes or lesions upon observation. Skin Intact , no breakdown noted.  Musculoskeletal :  Normal range of motion noted. Moves all extremities well, No swelling or tenderness noted  RESPIRATORY: Airway is open and patent, respirations are spontaneous, patient has a normal effort and rate. Breath sounds are clear & equal, bilaterally.  CARDIAC: Patient has a normal rate and rhythm, no peripheral edema noted, capillary refill < 3 seconds.   ABDOMEN: Soft and non tender to palpation, no distention observed.   PULSES: 2+ radial & pedal pulses, symmetrical in all extremities.  NEUROLOGIC: PERRL, Pupils mm and reacts briskly to light. Motor strength 5/5 all extremities.  The pt's facial expression is symmetrical, patient moving all extremities, normal sensation in all extremities when touched with a finger.The patient is awake, alert and cooperative with a calm affect, patient is aware of environment.      Will continue to monitor

## 2018-04-06 NOTE — ASSESSMENT & PLAN NOTE
- Pregnancy confirmed with + FHT at bedside in ED  - LMP and AUREA reported by patient from 1T US at [facility] not consistent

## 2018-04-06 NOTE — ED PROVIDER NOTES
"Encounter Date: 2018    SCRIBE #1 NOTE: I, Mihai Najera, am scribing for, and in the presence of, Dr. Mondragon.       History     Chief Complaint   Patient presents with    Emesis During Pregnancy     x3 weeks, pt reports passing blood clot yesterday, no bleeding currently. approx 10 weeks, , OB is Dr. Hurtado     8:51 AM    Patient is a 27 y.o. female,  approximately 10 weeks gestational age, who presents to the ED via EMS with complaint of persistent nausea and vomiting for the last three weeks. She reports associated decreased appetite and constipation, and notes decreased food and fluid intake. She states "i'm concerned that if I don't eat I'm going to lose my baby." She also reports new onset of diffuse generalized abdominal pain three days ago, which she states is constant. She states "I've been passing out for three days." She also reports passing a blood clot last night, but states this only occurred once and has since stopped. She denies fever, diarrhea, or urinary symptoms. She was seen here for nausea and vomiting about one week ago, and states she "felt worse after leaving" and reports no relief with zofran and phenergan suppositories prescribed. She reports no known sick contacts. She has no additional complaints.     Additional past medical, surgical, and social history as outlined in the nursing assessment was reviewed by me.        The history is provided by the patient.     Review of patient's allergies indicates:   Allergen Reactions    Codeine      History reviewed. No pertinent past medical history.  History reviewed. No pertinent surgical history.  History reviewed. No pertinent family history.  Social History   Substance Use Topics    Smoking status: Never Smoker    Smokeless tobacco: Not on file    Alcohol use No     Review of Systems   Constitutional: Negative for chills and fever.   Respiratory: Negative for shortness of breath.    Cardiovascular: Negative for chest pain. "   Gastrointestinal: Positive for abdominal pain, constipation, nausea and vomiting. Negative for diarrhea.   Genitourinary: Positive for vaginal bleeding (passed a clot once yesterday). Negative for dysuria.   Musculoskeletal: Negative for back pain.   Skin: Negative for rash.   Allergic/Immunologic: Negative for immunocompromised state.   Neurological: Negative for weakness and numbness.   Hematological: Does not bruise/bleed easily.   Psychiatric/Behavioral: Negative for confusion.       Physical Exam     Initial Vitals [04/06/18 0819]   BP Pulse Resp Temp SpO2   100/60 110 18 97.8 °F (36.6 °C) 99 %      MAP       73.33         Physical Exam    Nursing note and vitals reviewed.  Constitutional: She appears well-developed and well-nourished. She is not diaphoretic. No distress.   HENT:   Head: Normocephalic and atraumatic.   Right Ear: External ear normal.   Left Ear: External ear normal.   Dry mucous membranes.   Eyes: Conjunctivae and EOM are normal. Right eye exhibits no discharge. Left eye exhibits no discharge.   Neck: Normal range of motion. Neck supple. No tracheal deviation present.   Cardiovascular: Normal rate, regular rhythm, normal heart sounds and intact distal pulses. Exam reveals no gallop and no friction rub.    No murmur heard.  Pulmonary/Chest: Breath sounds normal. No stridor. No respiratory distress. She has no wheezes. She has no rhonchi. She has no rales.   Abdominal: Soft. Bowel sounds are normal. She exhibits no distension. There is no tenderness. There is no rebound and no guarding.   Musculoskeletal: Normal range of motion. She exhibits no edema or tenderness.   Neurological: She is alert and oriented to person, place, and time. She has normal strength. No cranial nerve deficit.   Skin: Skin is warm and dry. Capillary refill takes less than 2 seconds. No erythema. No pallor.   Psychiatric: She has a normal mood and affect. Her behavior is normal.         ED Course   US - ED  Performed  Date/Time: 4/6/2018 9:40 AM  Performed by: SARA ARMENTA  Authorized by: SARA ARMENTA   Comments: Bedside ultrasound: IUP with FHR of 120.        Labs Reviewed   BETA - HYDROXYBUTYRATE, SERUM - Abnormal; Notable for the following:        Result Value    Beta-Hydroxybutyrate 3.8 (*)     All other components within normal limits   BASIC METABOLIC PANEL - Abnormal; Notable for the following:     Sodium 134 (*)     CO2 16 (*)     BUN, Bld 22 (*)     Calcium 12.0 (*)     Anion Gap 20 (*)     All other components within normal limits   MAGNESIUM   PHOSPHORUS   URINALYSIS             Medical Decision Making:   Initial Assessment:   Patient presents with persistent hyperemesis. She now has associated aches and one episode of bleeding. I will perform a bedside ultrasound. I will give D5NS in addition to Reglan. I will reassess.  Clinical Tests:   Lab Tests: Ordered and Reviewed  ED Management:  10:00 - bedside ultrasound reveals IUP with +FHT.  During my ultrasound patient said to me that she wishes she could be admitted because she has sharp objects on her floor at home.  She is afraid she will fall and pass out.  When I asked what the sharp objects were she said they were furniture like tables sharp edges.  I explained that this decision would be made a GYN after IV fluid and further evaluation of her condition.  Her chemistry reveals a bicarbonate of 16 and anion gap 20.  Fluid is running.  I will continue to monitor.    1:40 PM - patient was able to void and ambulate with ease after 2 L of D5NS.  Repeat BMP showed closure of her anion gap.  She was able to tolerate liquids but then vomited after receiving crackers.  She says she still feels weak.  I will consult GYN at this time for possible admission.    1:59 PM - Case discussed with Dr. Potts who will see patient in the ED.     2:43 PM - Patient seen by Dr. Potts. They will admit to their service at this time.             Scribe Attestation:    Scribe #1: I performed the above scribed service and the documentation accurately describes the services I performed. I attest to the accuracy of the note.    Attending Attestation:           Physician Attestation for Scribe:  Physician Attestation Statement for Scribe #1: I, Dr. Mondragon, reviewed documentation, as scribed by Mihai Najera in my presence, and it is both accurate and complete.                    Clinical Impression:     1. Hyperemesis gravidarum                               Mackenzie Mondragon MD  04/06/18 3054

## 2018-04-06 NOTE — ASSESSMENT & PLAN NOTE
Patient will be admitted for treatment of HG:  - PO doxylamine and IV B6   - PRN Reglan and phenergan  - Mg/Phos WNL  - Hypokalemia: K+ 3.0 - replacement IV, will recheck BMP at 20:00  - D5 NS w/ 20mEq KCl @ 125mL, s/p 2L D5 NS  - Diet clear liquids as desired by patient

## 2018-04-06 NOTE — HPI
"Daija Purvis is a 27 y.o.  at approximately 10 weeks GA. Patient gives a h/o LMP 18, but says she was given an AUREA of 18 at her first prenatal visit. She reports constant nausea and vomiting for the past three weeks, and has been unable to keep food or liquid down for the past 2 weeks. She reports significant weight loss, per patient, "like 30 pounds." She also says for the past 3 days she passes out at least once a day - when walking around her house, vision goes black and she loses consciousness. She also has decreased appetite and infrequent bowel movements for the last several weeks. She was seen here last week for n/v, given phenergan/zofran suppositories. She reports no improvement. She also had one small (dime-sized) clot passed while she was urinating yesterday, but no bleeding prior or since.   She saw an OB at [facility] but has not yet had first trimester screenings and would like to transfer care to [facility].   She was diagnosed with UTI at first prenatal visit but has been unable to tolerate the medication. She denies dysuria currently.  "

## 2018-04-06 NOTE — HOSPITAL COURSE
04/06/2018 - Admitted for antiemetics and electrolyte management for hyperemesis gravidarum. Reported UTI that was not treated prior to admission. Found to have urine + for trichomonas. Given rocephin and flagyl. No N/V overnight with scheduled B6/doxylamine. Tolerated clears.  04/07/2018 - Reports 3 episodes of vomiting this AM. Reports improvement of symptoms with reglan, changed to scheduled TID. Phenergan 12.5mg added on QID. 4 more episodes throughout the day, last episode before bed.  04/08/2018 - Slept well, no episodes of emesis overnight. Reports continued nausea but improvement in symptoms.  04/09/2018- Reports improvement of nausea last night with thorazine. Was able to tolerate salad last night however vomited part of salad early this AM. Reports slight nausea and slight hunger this AM. Is tolerating small amounts of fluids.   04/10/2018 Tolerated all three meals yesterday. Denies nausea this AM. Transition to PO antiemetics today.

## 2018-04-06 NOTE — H&P
"[facility]  Obstetrics  History & Physical    Patient Name: Daija Purvis  MRN: [id number]  Admission Date: 2018  Primary Care Provider: Lizet Abreu MD    Subjective:     Principal Problem:Hyperemesis gravidarum    History of Present Illness:  Daija Purvis is a 27 y.o.  at approximately 10 weeks GA. Patient gives a h/o LMP 18, but says she was given an AUREA of 18 at her first prenatal visit. She reports constant nausea and vomiting for the past three weeks, and has been unable to keep food or liquid down for the past 2 weeks. She reports significant weight loss, per patient, "like 30 pounds." She also says for the past 3 days she passes out at least once a day - when walking around her house, vision goes black and she loses consciousness. She also has decreased appetite and infrequent bowel movements for the last several weeks. She was seen here last week for n/v, given phenergan/zofran suppositories. She reports no improvement. She also had one small (dime-sized) clot passed while she was urinating yesterday, but no bleeding prior or since.   She saw an OB at [facility] but has not yet had first trimester screenings and would like to transfer care to [facility].   She was diagnosed with UTI at first prenatal visit but has been unable to tolerate the medication. She denies dysuria currently.      Obstetric History       T0      L0     SAB0   TAB0   Ectopic0   Multiple0   Live Births0       # Outcome Date GA Lbr Lan/2nd Weight Sex Delivery Anes PTL Lv   1 Current                 History reviewed. No pertinent past medical history.  History reviewed. No pertinent surgical history.      (Not in a hospital admission)    Review of patient's allergies indicates:   Allergen Reactions    Codeine         Family History     None        Social History Main Topics    Smoking status: Never Smoker    Smokeless tobacco: Not on file    Alcohol use No    Drug use: " No    Sexual activity: Yes     Review of Systems   Constitutional: Positive for activity change, appetite change, fatigue and unexpected weight change. Negative for chills, diaphoresis and fever.   Respiratory: Negative for cough and shortness of breath.    Cardiovascular: Negative for chest pain.   Gastrointestinal: Positive for constipation, nausea and vomiting. Negative for abdominal pain, bloating and diarrhea.   Endocrine: Negative for diabetes.   Genitourinary: Positive for decreased libido. Negative for dyspareunia, flank pain, pelvic pain, vaginal bleeding, vaginal discharge, vaginal pain and vaginal odor.   Neurological: Positive for syncope. Negative for headaches.   Hematological: Does not bruise/bleed easily.   Breast: Positive for breast pain.     Objective:     Vital Signs (Most Recent):  Temp: 98 °F (36.7 °C) (04/06/18 1314)  Pulse: 100 (04/06/18 1241)  Resp: 18 (04/06/18 0819)  BP: (!) 150/90 (04/06/18 1241)  SpO2: 99 % (04/06/18 1241) Vital Signs (24h Range):  Temp:  [97.8 °F (36.6 °C)-98 °F (36.7 °C)] 98 °F (36.7 °C)  Pulse:  [] 100  Resp:  [18] 18  SpO2:  [97 %-99 %] 99 %  BP: (100-150)/(57-99) 150/90     Weight: 64 kg (141 lb)  Body mass index is 26.64 kg/m².    Physical Exam:   Constitutional: She is oriented to person, place, and time. She appears well-developed and well-nourished. No distress.    HENT:   Head: Normocephalic and atraumatic.    Eyes: Conjunctivae and EOM are normal.    Neck: Neck supple.    Cardiovascular: Normal rate, regular rhythm and normal heart sounds.  Exam reveals no edema.     Pulmonary/Chest: Effort normal and breath sounds normal. No respiratory distress.        Abdominal: Soft. Bowel sounds are normal. She exhibits no distension. There is no tenderness. There is no rebound and no guarding.             Musculoskeletal: Normal range of motion.       Neurological: She is alert and oriented to person, place, and time.    Skin: Skin is warm and dry. She is not  diaphoretic.    Psychiatric: She has a normal mood and affect. Her behavior is normal. Judgment and thought content normal.       Significant Labs:  No results found for: GROUPTRH, HEPBSAG, RUBELLAIGGSC, STREPBCULT, AFP, TEDPLPZ4JL    BMP:   Recent Labs  Lab 18  0846 18  1236   GLU 96 317*   * 133*   K 3.8 3.0*   CL 98 104   CO2 16* 22*   BUN 22* 16   CREATININE 0.9 0.8   CALCIUM 12.0* 8.7   MG 2.2  --      CBC: No results for input(s): WBC, RBC, HGB, HCT, PLT, MCV, MCH, MCHC in the last 48 hours.    Recent Labs  Lab 18  1152   COLORU Yellow   SPECGRAV 1.010   PHUR 6.0   PROTEINUA Negative   BACTERIA Few*   NITRITE Negative   LEUKOCYTESUR Negative   UROBILINOGEN Negative     I have personallly reviewed all pertinent lab results from the last 24 hours.    Assessment/Plan:     27 y.o. female  at Unknown for:    * Hyperemesis gravidarum    Patient will be admitted for treatment of HG:  - PO doxylamine and IV B6   - PRN Reglan and phenergan  - Mg/Phos WNL  - Hypokalemia: K+ 3.0 - replacement IV, will recheck BMP at 20:00  - D5 NS w/ 20mEq KCl @ 125mL, s/p 2L D5 NS  - Diet clear liquids as desired by patient        Vaginal bleeding affecting early pregnancy    - Dime-sized blood clot x 1 yesterday  - Denies bleeding prior and since  - T&S ordered        Trichimoniasis    - On urine in ED - will discuss with patient  - Flagyl IV 2g  - GC/CT and HIV/RPR added on to urine/labs        UTI (urinary tract infection) during pregnancy, first trimester    - Diagnosed by OB at [facility]  - No sx currently but was unable to tolerate PO medication  - Will treat with 1g rocephin IV x 1  - Will need ROGELIO        First trimester pregnancy    - Pregnancy confirmed with + FHT at bedside in ED  - LMP and AUREA reported by patient from 1T US at [facility] not consistent            Sumeet Potts MD  Obstetrics  [facility]

## 2018-04-06 NOTE — ED NOTES
"Rounding on patient complete. Pt tolerated 1 cup of water. She is AAOx4, VSS and respirations even and unlabored. She c/o "being hungry". She denies any n/v at this time. Side rails remain raised x2 and call light within reach. Will continue to monitor for any changes.  "

## 2018-04-06 NOTE — ED NOTES
Pt ambulated to restroom without difficulty. No s/s of n/v noted at this time. She denies any dizziness during ambulation. Will continue to monitor.

## 2018-04-06 NOTE — ED TRIAGE NOTES
Pt reports vomiting since the beginning of the pregnancy but started throughout the day x 3 weeks. Pt states she has been taking phenergan that is prescribed by her Dr but she states it is not working. Pt states she passed a clot last night but states the bleeding has now stopped. She is c/o slight abd pain now but thinks the pain is related to her vomiting.

## 2018-04-07 PROBLEM — R00.0 TACHYCARDIA: Status: ACTIVE | Noted: 2018-04-07

## 2018-04-07 LAB
ABO + RH BLD: NORMAL
ALBUMIN SERPL BCP-MCNC: 3.2 G/DL
ALP SERPL-CCNC: 37 U/L
ALT SERPL W/O P-5'-P-CCNC: 86 U/L
AMYLASE SERPL-CCNC: 46 U/L
ANION GAP SERPL CALC-SCNC: 4 MMOL/L
ANION GAP SERPL CALC-SCNC: 8 MMOL/L
AST SERPL-CCNC: 55 U/L
BACTERIA UR CULT: NORMAL
BACTERIA UR CULT: NORMAL
BILIRUB SERPL-MCNC: 0.7 MG/DL
BLD GP AB SCN CELLS X3 SERPL QL: NORMAL
BUN SERPL-MCNC: 3 MG/DL
BUN SERPL-MCNC: 7 MG/DL
C TRACH DNA SPEC QL NAA+PROBE: NOT DETECTED
CALCIUM SERPL-MCNC: 8.9 MG/DL
CALCIUM SERPL-MCNC: 9 MG/DL
CHLORIDE SERPL-SCNC: 110 MMOL/L
CHLORIDE SERPL-SCNC: 115 MMOL/L
CO2 SERPL-SCNC: 20 MMOL/L
CO2 SERPL-SCNC: 22 MMOL/L
CREAT SERPL-MCNC: 0.6 MG/DL
CREAT SERPL-MCNC: 0.6 MG/DL
EST. GFR  (AFRICAN AMERICAN): >60 ML/MIN/1.73 M^2
EST. GFR  (AFRICAN AMERICAN): >60 ML/MIN/1.73 M^2
EST. GFR  (NON AFRICAN AMERICAN): >60 ML/MIN/1.73 M^2
EST. GFR  (NON AFRICAN AMERICAN): >60 ML/MIN/1.73 M^2
GLUCOSE SERPL-MCNC: 101 MG/DL
GLUCOSE SERPL-MCNC: 90 MG/DL
LIPASE SERPL-CCNC: 51 U/L
N GONORRHOEA DNA SPEC QL NAA+PROBE: NOT DETECTED
POTASSIUM SERPL-SCNC: 3.3 MMOL/L
POTASSIUM SERPL-SCNC: 3.5 MMOL/L
PROT SERPL-MCNC: 5.8 G/DL
SODIUM SERPL-SCNC: 139 MMOL/L
SODIUM SERPL-SCNC: 140 MMOL/L

## 2018-04-07 PROCEDURE — 83690 ASSAY OF LIPASE: CPT

## 2018-04-07 PROCEDURE — 93005 ELECTROCARDIOGRAM TRACING: CPT

## 2018-04-07 PROCEDURE — 36415 COLL VENOUS BLD VENIPUNCTURE: CPT

## 2018-04-07 PROCEDURE — 63600175 PHARM REV CODE 636 W HCPCS: Performed by: STUDENT IN AN ORGANIZED HEALTH CARE EDUCATION/TRAINING PROGRAM

## 2018-04-07 PROCEDURE — 63700000 PHARM REV CODE 250 ALT 637 W/O HCPCS: Performed by: STUDENT IN AN ORGANIZED HEALTH CARE EDUCATION/TRAINING PROGRAM

## 2018-04-07 PROCEDURE — 93010 ELECTROCARDIOGRAM REPORT: CPT | Mod: ,,, | Performed by: INTERNAL MEDICINE

## 2018-04-07 PROCEDURE — 99900035 HC TECH TIME PER 15 MIN (STAT)

## 2018-04-07 PROCEDURE — 25000003 PHARM REV CODE 250: Performed by: STUDENT IN AN ORGANIZED HEALTH CARE EDUCATION/TRAINING PROGRAM

## 2018-04-07 PROCEDURE — G0378 HOSPITAL OBSERVATION PER HR: HCPCS

## 2018-04-07 PROCEDURE — 25000003 PHARM REV CODE 250: Performed by: OBSTETRICS & GYNECOLOGY

## 2018-04-07 PROCEDURE — 80048 BASIC METABOLIC PNL TOTAL CA: CPT

## 2018-04-07 PROCEDURE — 80053 COMPREHEN METABOLIC PANEL: CPT

## 2018-04-07 PROCEDURE — 82150 ASSAY OF AMYLASE: CPT

## 2018-04-07 RX ORDER — POTASSIUM CHLORIDE 7.45 MG/ML
10 INJECTION INTRAVENOUS
Status: COMPLETED | OUTPATIENT
Start: 2018-04-07 | End: 2018-04-07

## 2018-04-07 RX ORDER — PROMETHAZINE HYDROCHLORIDE 12.5 MG/1
12.5 TABLET ORAL EVERY 6 HOURS
Status: DISCONTINUED | OUTPATIENT
Start: 2018-04-07 | End: 2018-04-07

## 2018-04-07 RX ORDER — METOCLOPRAMIDE HYDROCHLORIDE 5 MG/ML
10 INJECTION INTRAMUSCULAR; INTRAVENOUS EVERY 8 HOURS
Status: DISCONTINUED | OUTPATIENT
Start: 2018-04-07 | End: 2018-04-10

## 2018-04-07 RX ORDER — FAMOTIDINE 20 MG/1
20 TABLET, FILM COATED ORAL 2 TIMES DAILY
Status: DISCONTINUED | OUTPATIENT
Start: 2018-04-07 | End: 2018-04-10 | Stop reason: HOSPADM

## 2018-04-07 RX ADMIN — FAMOTIDINE 20 MG: 20 TABLET, FILM COATED ORAL at 09:04

## 2018-04-07 RX ADMIN — DEXTROSE MONOHYDRATE, SODIUM CHLORIDE, AND POTASSIUM CHLORIDE: 50; 9; 1.49 INJECTION, SOLUTION INTRAVENOUS at 01:04

## 2018-04-07 RX ADMIN — DOXYLAMINE SUCCINATE 12.5 MG: 25 TABLET ORAL at 04:04

## 2018-04-07 RX ADMIN — PYRIDOXINE HYDROCHLORIDE 25 MG: 100 INJECTION, SOLUTION INTRAMUSCULAR; INTRAVENOUS at 03:04

## 2018-04-07 RX ADMIN — PYRIDOXINE HYDROCHLORIDE 25 MG: 100 INJECTION, SOLUTION INTRAMUSCULAR; INTRAVENOUS at 08:04

## 2018-04-07 RX ADMIN — DEXTROSE MONOHYDRATE, SODIUM CHLORIDE, AND POTASSIUM CHLORIDE: 50; 9; 1.49 INJECTION, SOLUTION INTRAVENOUS at 04:04

## 2018-04-07 RX ADMIN — POTASSIUM CHLORIDE 10 MEQ: 7.46 INJECTION, SOLUTION INTRAVENOUS at 12:04

## 2018-04-07 RX ADMIN — METOCLOPRAMIDE HYDROCHLORIDE 10 MG: 5 INJECTION INTRAMUSCULAR; INTRAVENOUS at 05:04

## 2018-04-07 RX ADMIN — DOXYLAMINE SUCCINATE 12.5 MG: 25 TABLET ORAL at 08:04

## 2018-04-07 RX ADMIN — PROMETHAZINE HYDROCHLORIDE 12.5 MG: 25 INJECTION INTRAMUSCULAR; INTRAVENOUS at 11:04

## 2018-04-07 RX ADMIN — POTASSIUM CHLORIDE 10 MEQ: 7.46 INJECTION, SOLUTION INTRAVENOUS at 02:04

## 2018-04-07 RX ADMIN — POTASSIUM CHLORIDE 10 MEQ: 7.46 INJECTION, SOLUTION INTRAVENOUS at 03:04

## 2018-04-07 RX ADMIN — METHYLPREDNISOLONE SODIUM SUCCINATE 16 MG: 40 INJECTION, POWDER, FOR SOLUTION INTRAMUSCULAR; INTRAVENOUS at 08:04

## 2018-04-07 RX ADMIN — SIMETHICONE CHEW TAB 80 MG 80 MG: 80 TABLET ORAL at 08:04

## 2018-04-07 RX ADMIN — POTASSIUM CHLORIDE 10 MEQ: 7.46 INJECTION, SOLUTION INTRAVENOUS at 04:04

## 2018-04-07 RX ADMIN — METOCLOPRAMIDE 10 MG: 5 INJECTION, SOLUTION INTRAMUSCULAR; INTRAVENOUS at 07:04

## 2018-04-07 RX ADMIN — PRENATAL VIT W/ FE FUMARATE-FA TAB 27-0.8 MG 1 TABLET: 27-0.8 TAB at 08:04

## 2018-04-07 RX ADMIN — METOCLOPRAMIDE 10 MG: 5 INJECTION, SOLUTION INTRAMUSCULAR; INTRAVENOUS at 04:04

## 2018-04-07 RX ADMIN — METOCLOPRAMIDE 10 MG: 5 INJECTION, SOLUTION INTRAMUSCULAR; INTRAVENOUS at 09:04

## 2018-04-07 RX ADMIN — POTASSIUM CHLORIDE 10 MEQ: 7.46 INJECTION, SOLUTION INTRAVENOUS at 01:04

## 2018-04-07 NOTE — ASSESSMENT & PLAN NOTE
- On urine in ED, discussed with patient including need for partner treatment  - s/p Flagyl IV 2g  - GC/CT negative  - HIV, RPR, Hep panel pending

## 2018-04-07 NOTE — ASSESSMENT & PLAN NOTE
- Dime-sized blood clot x 1 yesterday  - no further bleeding  - T&S A POS -rhogam not indicated  - continue to monitor for further bleeding

## 2018-04-07 NOTE — PLAN OF CARE
"Discharge planner met with patient at the bedside, Patient denies any inpatient admissions less than 30 days.  Patient recently moved to [place] from [place], lives alone and " Danika hard to take care of myself because I keep passing out."  When asked does she has family or friends that can assist with daily activities, patient responds " I'm from [place] and do not have family here because my sister went back home." However, when asked what relation is her emergency contact, she says its her aunt, adding that her aunt can not help during the day because she works.   Patient indecisive  about her plans to return to [place].  She shows inability to make decisions effectively, as most of her answers are " I don't know" " I'll see."   Verified her PCP as Dr. Abreu; last seen Jan 2018.   No needs identified at this time, case management to continue to follow. Aunt available to transport home at time of discharge.    4/7/18 1411   Discharge Assessment   Assessment Type Discharge Planning Assessment   Confirmed/corrected address and phone number on face sheet? Yes   Assessment information obtained from? Patient    Communicated expected length of stay with patient/caregiver Yes, Observation   Type of Healthcare Directive Received None    If Healthcare Directive is received, is it scanned into Epic? no (comment)   Prior to hospitalization cognitive status: Alert/Oriented   Prior to hospitalization functional status: Independent    Current cognitive status: Alert/Oriented    Current Functional Status: independent   Arrived From home or self-care   Lives With Alone    Able to Return to Prior Arrangements Yes    Is patient able to care for self after discharge? No   Patient currently being followed by outpatient case management? No   Patient currently receives home health services? No   Does the patient currently use HME? No   Patient currently receives any other outside agency services? No   Do you have any problems affording " any of your prescribed medications? No    Is the patient taking medications as prescribed? Yes    Do you have any financial concerns preventing you from receiving the healthcare you need? No   Does the patient have transportation to healthcare appointments? Yes   Transportation Available family or friend will provide   Discharge Plan A Home with self care   Discharge Plan B Home with family    Patient/Family In Agreement With Plan Yes

## 2018-04-07 NOTE — ASSESSMENT & PLAN NOTE
- Diagnosed by OB at [facility] sx currently but was unable to tolerate PO medication  - s/p 1g rocephin IV x 1  - UCx pending

## 2018-04-07 NOTE — PLAN OF CARE
Problem: Patient Care Overview  Goal: Plan of Care Review  Outcome: Ongoing (interventions implemented as appropriate)  No significant events noted throughout shift. Free of falls/trauma/inury. VSS. Denies any CP, SOB, palpitations, or dizziness. General skin remains intact with no new breakdown. Turning/repositioning independently in bed. NS infusing per MD orders, tolerating well.  No c/o pain or any other discomforts this shift. Plan of care reviewed and updated, verbalizes understanding. No acute distress noted. Will continue to monitor.

## 2018-04-07 NOTE — PROGRESS NOTES
"[facility]  Obstetrics  Antepartum Progress Note    Patient Name: Daija Purvis  MRN: [id number]  Admission Date: 2018  Hospital Length of Stay: 0 days  Attending Physician: Sowmya Arndt DO  Primary Care Provider: Lizet Abreu MD    Subjective:     Principal Problem:Hyperemesis gravidarum    HPI:  Daija Purvis is a 27 y.o.  at approximately 10 weeks GA. Patient gives a h/o LMP 18, but says she was given an AUREA of 18 at her first prenatal visit. She reports constant nausea and vomiting for the past three weeks, and has been unable to keep food or liquid down for the past 2 weeks. She reports significant weight loss, per patient, "like 30 pounds." She also says for the past 3 days she passes out at least once a day - when walking around her house, vision goes black and she loses consciousness. She also has decreased appetite and infrequent bowel movements for the last several weeks. She was seen here last week for n/v, given phenergan/zofran suppositories. She reports no improvement. She also had one small (dime-sized) clot passed while she was urinating yesterday, but no bleeding prior or since.   She saw an OB at [facility] but has not yet had first trimester screenings and would like to transfer care to [facility].   She was diagnosed with UTI at first prenatal visit but has been unable to tolerate the medication. She denies dysuria currently.    Hospital Course:  2018 - Admitted for antiemetics and electrolyte management for hyperemesis gravidarum. Reported UTI that was not treated prior to admission. Found to have urine + for trichomonas. Given rocephin and flagyl. No N/V overnight with scheduled B6/doxylamine. Tolerated clears.  2018 - Reports 3 episodes of vomiting this AM. Reports improvement of symptoms with reglan, changed to scheduled TID.     Interval Hx: Patient reports mild nausea but no vomiting overnight, tolerated clears last PM. " Reports 3 episodes of vomiting this morning. She reports good control of symptoms with reglan.  Denies VB, abdominal pain, cramping.     She also reports new chest pain and SOB that started this morning after an episode of vomiting.  at time of episode. 122 by the time MD to bedside. Decreased further to 106 (baseline 90s-100s) over the next 5-10 minutes. Denies pleuritic pain or lower extremity pain or swelling. Reports symptoms seem to be improving.     Objective:     Vital Signs (Most Recent):  Temp: 98.2 °F (36.8 °C) (04/07/18 0350)  Pulse: 106 (04/07/18 0611)  Resp: 16 (04/07/18 0611)  BP: 120/81 (04/07/18 0611)  SpO2: 100 % (04/07/18 0611) Vital Signs (24h Range):  Temp:  [97.8 °F (36.6 °C)-99 °F (37.2 °C)] 98.2 °F (36.8 °C)  Pulse:  [] 145  Resp:  [18-20] 18  SpO2:  [97 %-100 %] 97 %  BP: (100-150)/(57-99) 122/87     Weight: 62.9 kg (138 lb 10.7 oz)  Body mass index is 26.2 kg/m².      Intake/Output Summary (Last 24 hours) at 04/07/18 0559  Last data filed at 04/07/18 0000   Gross per 24 hour   Intake             3240 ml   Output                0 ml   Net             3240 ml     Significant Labs:    Recent Labs  Lab 04/06/18  0846 04/06/18  1236 04/06/18  2309 04/07/18  0511   * 133* 138 139   K 3.8 3.0* 3.0* 3.5   CL 98 104 112* 115*   CO2 16* 22* 19* 20*   BUN 22* 16 9 7   CREATININE 0.9 0.8 0.7 0.6   GLU 96 317* 105 101   MG 2.2  --   --   --    PHOS 3.7  --   --   --      T&S A POS    GC/CT neg    UCx and STD labs pending    Physical Exam:   Constitutional: She is oriented to person, place, and time. She appears well-developed and well-nourished. No distress.    HENT:   Head: Normocephalic and atraumatic.    Eyes: EOM are normal.    Neck: Normal range of motion.    Cardiovascular: Regular rhythm.  Exam reveals no gallop.    Mildly tachycardic    Pulmonary/Chest: Effort normal and breath sounds normal. No respiratory distress.        Abdominal: Soft. She exhibits no distension. There is  no tenderness.             Musculoskeletal: Normal range of motion and moves all extremeties. She exhibits no edema or tenderness.   No calf tenderness or palpable cords, no edema, homans sign negative bilaterally       Neurological: She is alert and oriented to person, place, and time.    Skin: Skin is warm and dry.    Psychiatric: She has a normal mood and affect. Her behavior is normal.       Assessment/Plan:     27 y.o. female  at ~10 weeks for:    * Hyperemesis gravidarum    - PO doxylamine and IV B6 scheduled q6  - change reglan to scheduled TID  - PRN phenergan for breakthrough N/V  - Electrolytes normal this AM  - D5 NS w/ 20mEq KCl @ 125mL, s/p 2L D5 NS  - Diet clear liquids this AM. If tolerates, consider regular diet as tolerated.        Tachycardia    - new onset, after episode of vomiting this AM  - HR up to 145, quickly resolved back to 100s over course of a few minutes  - EKG sinus tach, non specific T wave abnormality  - given hypercoagulable nature of pregnancy and short period of SOB with symptom onset, will obtain lower extremity dopplers for evaluate for VTE. No signs of DVT on exam.  - continuous pulse ox for now, sats 100% on O2 NC. Trial of room air to see if SOB fully resolved.        Vaginal bleeding affecting early pregnancy    - Dime-sized blood clot x 1 yesterday  - no further bleeding  - T&S A POS -rhogam not indicated  - continue to monitor for further bleeding        Trichimoniasis    - On urine in ED, discussed with patient including need for partner treatment  - s/p Flagyl IV 2g  - GC/CT negative  - HIV, RPR, Hep panel pending        UTI (urinary tract infection) during pregnancy, first trimester    - Diagnosed by OB at [facility]  - No sx currently but was unable to tolerate PO medication  - s/p 1g rocephin IV x 1  - UCx pending        First trimester pregnancy    - FHT verified on admission  - reports ~10 weeks by 7 week US at [facility]  MD  Obstetrics  [facility]    Patient continues to experience vomiting.  Given Reglan IV   Fells slightly better.

## 2018-04-07 NOTE — PROGRESS NOTES
Called to pt room by charge nurse Charito PETERS. Pt c/o chest pain, SOB, and has a HR of 150. Sat pt up and placed 2LNC. OBGYN team paged. A STAT EKG was ordered. Other vss. OBGYN on call at the bedside performing her assessment. Pt HR down to 109, other v/s remain stable. Pt placed on continuous pulse ox and MD ordered U/s. Pt no ;onger having complaints of chest pain or SOB

## 2018-04-07 NOTE — ASSESSMENT & PLAN NOTE
- new onset, after episode of vomiting this AM  - HR up to 145, quickly resolved back to 100s over course of a few minutes  - EKG sinus tach, non specific T wave abnormality  - given hypercoagulable nature of pregnancy and short period of SOB with symptom onset, will obtain lower extremity dopplers for evaluate for VTE. No signs of DVT on exam.  - continuous pulse ox for now, sats 100% on O2 NC. Trial of room air to see if SOB fully resolved.

## 2018-04-07 NOTE — PROGRESS NOTES
Patient in bed resting. Seems to be doing fine. Reported that she vomited twice this shift. No complaints of pain or discomfort at this time. Bed locked and low. Call bell in reach. Will continue to monitor.

## 2018-04-07 NOTE — SUBJECTIVE & OBJECTIVE
Interval Hx: Patient reports mild nausea but no vomiting overnight, tolerated clears last PM. Reports 3 episodes of vomiting this morning. She reports good control of symptoms with reglan.  Denies VB, abdominal pain, cramping.     She also reports new chest pain and SOB that started this morning after an episode of vomiting.  at time of episode. 122 by the time MD to bedside. Decreased further to 106 (baseline 90s-100s) over the next 5-10 minutes. Denies pleuritic pain or lower extremity pain or swelling. Reports symptoms seem to be improving.     Objective:     Vital Signs (Most Recent):  Temp: 98.2 °F (36.8 °C) (04/07/18 0350)  Pulse: 106 (04/07/18 0611)  Resp: 16 (04/07/18 0611)  BP: 120/81 (04/07/18 0611)  SpO2: 100 % (04/07/18 0611) Vital Signs (24h Range):  Temp:  [97.8 °F (36.6 °C)-99 °F (37.2 °C)] 98.2 °F (36.8 °C)  Pulse:  [] 145  Resp:  [18-20] 18  SpO2:  [97 %-100 %] 97 %  BP: (100-150)/(57-99) 122/87     Weight: 62.9 kg (138 lb 10.7 oz)  Body mass index is 26.2 kg/m².      Intake/Output Summary (Last 24 hours) at 04/07/18 0559  Last data filed at 04/07/18 0000   Gross per 24 hour   Intake             3240 ml   Output                0 ml   Net             3240 ml     Significant Labs:    Recent Labs  Lab 04/06/18  0846 04/06/18  1236 04/06/18  2309 04/07/18  0511   * 133* 138 139   K 3.8 3.0* 3.0* 3.5   CL 98 104 112* 115*   CO2 16* 22* 19* 20*   BUN 22* 16 9 7   CREATININE 0.9 0.8 0.7 0.6   GLU 96 317* 105 101   MG 2.2  --   --   --    PHOS 3.7  --   --   --      T&S A POS    GC/CT neg    UCx and STD labs pending    Physical Exam:   Constitutional: She is oriented to person, place, and time. She appears well-developed and well-nourished. No distress.    HENT:   Head: Normocephalic and atraumatic.    Eyes: EOM are normal.    Neck: Normal range of motion.    Cardiovascular: Regular rhythm.  Exam reveals no gallop.    Mildly tachycardic    Pulmonary/Chest: Effort normal and breath sounds  normal. No respiratory distress.        Abdominal: Soft. She exhibits no distension. There is no tenderness.             Musculoskeletal: Normal range of motion and moves all extremeties. She exhibits no edema or tenderness.   No calf tenderness or palpable cords, no edema, homans sign negative bilaterally       Neurological: She is alert and oriented to person, place, and time.    Skin: Skin is warm and dry.    Psychiatric: She has a normal mood and affect. Her behavior is normal.

## 2018-04-08 PROBLEM — O23.41 UTI (URINARY TRACT INFECTION) DURING PREGNANCY, FIRST TRIMESTER: Status: RESOLVED | Noted: 2018-04-06 | Resolved: 2018-04-08

## 2018-04-08 PROBLEM — R00.0 TACHYCARDIA: Status: RESOLVED | Noted: 2018-04-07 | Resolved: 2018-04-08

## 2018-04-08 PROCEDURE — 99232 SBSQ HOSP IP/OBS MODERATE 35: CPT | Mod: ,,, | Performed by: OBSTETRICS & GYNECOLOGY

## 2018-04-08 PROCEDURE — 63700000 PHARM REV CODE 250 ALT 637 W/O HCPCS: Performed by: STUDENT IN AN ORGANIZED HEALTH CARE EDUCATION/TRAINING PROGRAM

## 2018-04-08 PROCEDURE — 63600175 PHARM REV CODE 636 W HCPCS: Performed by: STUDENT IN AN ORGANIZED HEALTH CARE EDUCATION/TRAINING PROGRAM

## 2018-04-08 PROCEDURE — 25000003 PHARM REV CODE 250: Performed by: OBSTETRICS & GYNECOLOGY

## 2018-04-08 PROCEDURE — 25000003 PHARM REV CODE 250: Performed by: STUDENT IN AN ORGANIZED HEALTH CARE EDUCATION/TRAINING PROGRAM

## 2018-04-08 PROCEDURE — 63600175 PHARM REV CODE 636 W HCPCS: Performed by: OBSTETRICS & GYNECOLOGY

## 2018-04-08 PROCEDURE — 11000001 HC ACUTE MED/SURG PRIVATE ROOM

## 2018-04-08 RX ORDER — POTASSIUM CHLORIDE 7.45 MG/ML
10 INJECTION INTRAVENOUS
Status: COMPLETED | OUTPATIENT
Start: 2018-04-08 | End: 2018-04-08

## 2018-04-08 RX ORDER — CHLORPROMAZINE HYDROCHLORIDE 25 MG/ML
25 INJECTION INTRAMUSCULAR 4 TIMES DAILY
Status: DISCONTINUED | OUTPATIENT
Start: 2018-04-08 | End: 2018-04-10

## 2018-04-08 RX ADMIN — POTASSIUM CHLORIDE 10 MEQ: 7.46 INJECTION, SOLUTION INTRAVENOUS at 10:04

## 2018-04-08 RX ADMIN — PRENATAL VIT W/ FE FUMARATE-FA TAB 27-0.8 MG 1 TABLET: 27-0.8 TAB at 09:04

## 2018-04-08 RX ADMIN — DOXYLAMINE SUCCINATE 12.5 MG: 25 TABLET ORAL at 02:04

## 2018-04-08 RX ADMIN — DOXYLAMINE SUCCINATE 12.5 MG: 25 TABLET ORAL at 03:04

## 2018-04-08 RX ADMIN — PROMETHAZINE HYDROCHLORIDE 25 MG: 25 INJECTION INTRAMUSCULAR; INTRAVENOUS at 11:04

## 2018-04-08 RX ADMIN — DOXYLAMINE SUCCINATE 12.5 MG: 25 TABLET ORAL at 09:04

## 2018-04-08 RX ADMIN — PYRIDOXINE HYDROCHLORIDE 25 MG: 100 INJECTION, SOLUTION INTRAMUSCULAR; INTRAVENOUS at 09:04

## 2018-04-08 RX ADMIN — CHLORPROMAZINE HYDROCHLORIDE 25 MG: 25 INJECTION INTRAMUSCULAR at 05:04

## 2018-04-08 RX ADMIN — FAMOTIDINE 20 MG: 20 TABLET, FILM COATED ORAL at 09:04

## 2018-04-08 RX ADMIN — METOCLOPRAMIDE 10 MG: 5 INJECTION, SOLUTION INTRAMUSCULAR; INTRAVENOUS at 05:04

## 2018-04-08 RX ADMIN — POTASSIUM CHLORIDE 10 MEQ: 7.46 INJECTION, SOLUTION INTRAVENOUS at 09:04

## 2018-04-08 RX ADMIN — POTASSIUM CHLORIDE 10 MEQ: 7.46 INJECTION, SOLUTION INTRAVENOUS at 11:04

## 2018-04-08 RX ADMIN — METOCLOPRAMIDE 10 MG: 5 INJECTION, SOLUTION INTRAMUSCULAR; INTRAVENOUS at 02:04

## 2018-04-08 RX ADMIN — PYRIDOXINE HYDROCHLORIDE 25 MG: 100 INJECTION, SOLUTION INTRAMUSCULAR; INTRAVENOUS at 03:04

## 2018-04-08 RX ADMIN — DEXTROSE MONOHYDRATE, SODIUM CHLORIDE, AND POTASSIUM CHLORIDE: 50; 9; 1.49 INJECTION, SOLUTION INTRAVENOUS at 12:04

## 2018-04-08 RX ADMIN — SIMETHICONE CHEW TAB 80 MG 80 MG: 80 TABLET ORAL at 09:04

## 2018-04-08 RX ADMIN — DEXTROSE MONOHYDRATE, SODIUM CHLORIDE, AND POTASSIUM CHLORIDE: 50; 9; 1.49 INJECTION, SOLUTION INTRAVENOUS at 02:04

## 2018-04-08 RX ADMIN — PROMETHAZINE HYDROCHLORIDE 12.5 MG: 25 INJECTION INTRAMUSCULAR; INTRAVENOUS at 05:04

## 2018-04-08 RX ADMIN — CHLORPROMAZINE HYDROCHLORIDE 25 MG: 25 INJECTION INTRAMUSCULAR at 09:04

## 2018-04-08 RX ADMIN — PYRIDOXINE HYDROCHLORIDE 25 MG: 100 INJECTION, SOLUTION INTRAMUSCULAR; INTRAVENOUS at 02:04

## 2018-04-08 RX ADMIN — METOCLOPRAMIDE 10 MG: 5 INJECTION, SOLUTION INTRAMUSCULAR; INTRAVENOUS at 10:04

## 2018-04-08 NOTE — PROGRESS NOTES
(Late entry note)    Patient reports she is 10 weeks pregnant, however by LMP she is 12 weeks GA, and by the AUREA she reports having been given at her first prenatal appointment, she is 8 weeks GA.   Attempted to obtain records of first trimester ultrasound from [facility] - Reported that her physician is in a private group and the hospital does not have access to records.   Due to uncertain dating, will d/c methylprednisolone as there are some studies showing as association with increased risk of cleft lip and palate if used in <10 w GA.   Added scheduled phenergan as patient vomited again after clear liquids.    Amylase and lipase ordered to r/o pancreatitis.   CMP for liver enzymes as well as electrolytes.     Sumeet Potts M.D.  Obstetrics & Gynecology  PGY-1

## 2018-04-08 NOTE — ASSESSMENT & PLAN NOTE
- PO doxylamine and IV B6 scheduled q6  - Continue Reglan TID, phenergan QID added last night  - D5 NS w/ 20mEq KCl @ 125mL, s/p 2L D5 NS  - Diet clear liquids this AM.

## 2018-04-08 NOTE — PLAN OF CARE
Problem: Patient Care Overview  Goal: Plan of Care Review  Outcome: Ongoing (interventions implemented as appropriate)  Plan of care reviewed with Pt, purposeful hourly rounding performed. VSS on RA. NAD during shift. No episodes of emesis since scheduled Phenergan IV started. Pt resting quietly between care. No c/o at this time. Bed locked and lowered, call light in reach. Will continue to monitor.

## 2018-04-08 NOTE — PLAN OF CARE
Problem: Patient Care Overview  Goal: Plan of Care Review  Outcome: Ongoing (interventions implemented as appropriate)  Pt remained free of falls/ injuries, AAOX4. Changed to regular diet, did not tolerate well, vomited X1. Reinforced teaching of food choices. Call light within reach, bed down, bed alarm on, side rails X2, instructed to call for assistance.

## 2018-04-08 NOTE — PROGRESS NOTES
"[facility]  Obstetrics  Antepartum Progress Note    Patient Name: Daija Purvis  MRN: [id number]  Admission Date: 2018  Hospital Length of Stay: 0 days  Attending Physician: Sowmya Arndt DO  Primary Care Provider: Lizet Abreu MD    Subjective:     Principal Problem:Hyperemesis gravidarum    HPI:  Daija Purvis is a 27 y.o.  at approximately 10 weeks GA. Patient gives a h/o LMP 18, but says she was given an AUREA of 18 at her first prenatal visit. She reports constant nausea and vomiting for the past three weeks, and has been unable to keep food or liquid down for the past 2 weeks. She reports significant weight loss, per patient, "like 30 pounds." She also says for the past 3 days she passes out at least once a day - when walking around her house, vision goes black and she loses consciousness. She also has decreased appetite and infrequent bowel movements for the last several weeks. She was seen here last week for n/v, given phenergan/zofran suppositories. She reports no improvement. She also had one small (dime-sized) clot passed while she was urinating yesterday, but no bleeding prior or since.   She saw an OB at [facility] but has not yet had first trimester screenings and would like to transfer care to [facility].   She was diagnosed with UTI at first prenatal visit but has been unable to tolerate the medication. She denies dysuria currently.    Hospital Course:  2018 - Admitted for antiemetics and electrolyte management for hyperemesis gravidarum. Reported UTI that was not treated prior to admission. Found to have urine + for trichomonas. Given rocephin and flagyl. No N/V overnight with scheduled B6/doxylamine. Tolerated clears.  2018 - Reports 3 episodes of vomiting this AM. Reports improvement of symptoms with reglan, changed to scheduled TID. Phenergan 12.5mg added on QID. 4 more episodes throughout the day, last episode before " bed.  04/08/2018 - Slept well, no episodes of emesis overnight. Reports continued nausea but improvement in symptoms.      Interval Hx: Slept well, no episodes of emesis overnight. Reports continued nausea but improvement in symptoms. Reglan and phenergan is improving symptoms. Kept chicken broth down at lunch but episode of emesis later in the afternoon, kept chicken broth down for dinner but episode of emesis in the evening.  Denies contractions, VB.     Objective:     Vital Signs (Most Recent):  Temp: 98.3 °F (36.8 °C) (04/08/18 0442)  Pulse: 76 (04/08/18 0442)  Resp: 18 (04/08/18 0442)  BP: 122/83 (04/08/18 0442)  SpO2: 100 % (04/08/18 0442) Vital Signs (24h Range):  Temp:  [96.3 °F (35.7 °C)-98.8 °F (37.1 °C)] 98.3 °F (36.8 °C)  Pulse:  [] 76  Resp:  [16-18] 18  SpO2:  [99 %-100 %] 100 %  BP: (116-128)/(66-86) 122/83     Weight: 62.9 kg (138 lb 10.7 oz)  Body mass index is 26.2 kg/m².      Intake/Output Summary (Last 24 hours) at 04/08/18 0728  Last data filed at 04/07/18 2012   Gross per 24 hour   Intake               50 ml   Output                0 ml   Net               50 ml     Significant Labs:    Recent Labs  Lab 04/06/18  0846  04/06/18 2309 04/07/18  0511 04/07/18 2022   *  < > 138 139 140   K 3.8  < > 3.0* 3.5 3.3*   CL 98  < > 112* 115* 110   CO2 16*  < > 19* 20* 22*   BUN 22*  < > 9 7 3*   CREATININE 0.9  < > 0.7 0.6 0.6   GLU 96  < > 105 101 90   MG 2.2  --   --   --   --    PHOS 3.7  --   --   --   --    < > = values in this interval not displayed.  T&S A POS    GC/CT neg    UCx and STD labs pending    Physical Exam:   Constitutional: She is oriented to person, place, and time. She appears well-developed and well-nourished. No distress.    HENT:   Head: Normocephalic and atraumatic.    Eyes: EOM are normal.    Neck: Normal range of motion.    Cardiovascular: Regular rhythm.  Exam reveals no gallop.     Pulmonary/Chest: Effort normal and breath sounds normal. No respiratory distress.         Abdominal: Soft. She exhibits no distension. There is no tenderness.             Musculoskeletal: Normal range of motion and moves all extremeties. She exhibits no edema or tenderness.       Neurological: She is alert and oriented to person, place, and time.    Skin: Skin is warm and dry.    Psychiatric: She has a normal mood and affect. Her behavior is normal.       Assessment/Plan:     27 y.o. female  at Unknown for:    * Hyperemesis gravidarum    - PO doxylamine and IV B6 scheduled q6  - Continue Reglan TID, phenergan QID added last night  - D5 NS w/ 20mEq KCl @ 125mL, s/p 2L D5 NS  - Diet clear liquids this AM.        Vaginal bleeding affecting early pregnancy    - Dime-sized blood clot x 1 yesterday  - no further bleeding  - T&S A POS -rhogam not indicated  - continue to monitor for further bleeding        Trichimoniasis    - On urine in ED, discussed with patient including need for partner treatment  - s/p Flagyl IV 2g  - GC/CT negative  - HIV, RPR, Hep panel pending        First trimester pregnancy    - FHT verified on admission  - reports ~10 weeks by 7 week US at [facility], however AUREA puts her at 8.4 weeks              AMOS Abdi MD  Obstetrics  [facility]

## 2018-04-08 NOTE — SUBJECTIVE & OBJECTIVE
Interval Hx: Slept well, no episodes of emesis overnight. Reports continued nausea but improvement in symptoms. Reglan and phenergan is improving symptoms. Kept chicken broth down at lunch but episode of emesis later in the afternoon, kept chicken broth down for dinner but episode of emesis in the evening.  Denies contractions, VB.     Objective:     Vital Signs (Most Recent):  Temp: 98.3 °F (36.8 °C) (04/08/18 0442)  Pulse: 76 (04/08/18 0442)  Resp: 18 (04/08/18 0442)  BP: 122/83 (04/08/18 0442)  SpO2: 100 % (04/08/18 0442) Vital Signs (24h Range):  Temp:  [96.3 °F (35.7 °C)-98.8 °F (37.1 °C)] 98.3 °F (36.8 °C)  Pulse:  [] 76  Resp:  [16-18] 18  SpO2:  [99 %-100 %] 100 %  BP: (116-128)/(66-86) 122/83     Weight: 62.9 kg (138 lb 10.7 oz)  Body mass index is 26.2 kg/m².      Intake/Output Summary (Last 24 hours) at 04/08/18 0728  Last data filed at 04/07/18 2012   Gross per 24 hour   Intake               50 ml   Output                0 ml   Net               50 ml     Significant Labs:    Recent Labs  Lab 04/06/18  0846  04/06/18  2309 04/07/18  0511 04/07/18 2022   *  < > 138 139 140   K 3.8  < > 3.0* 3.5 3.3*   CL 98  < > 112* 115* 110   CO2 16*  < > 19* 20* 22*   BUN 22*  < > 9 7 3*   CREATININE 0.9  < > 0.7 0.6 0.6   GLU 96  < > 105 101 90   MG 2.2  --   --   --   --    PHOS 3.7  --   --   --   --    < > = values in this interval not displayed.  T&S A POS    GC/CT neg    UCx and STD labs pending    Physical Exam:   Constitutional: She is oriented to person, place, and time. She appears well-developed and well-nourished. No distress.    HENT:   Head: Normocephalic and atraumatic.    Eyes: EOM are normal.    Neck: Normal range of motion.    Cardiovascular: Regular rhythm.  Exam reveals no gallop.     Pulmonary/Chest: Effort normal and breath sounds normal. No respiratory distress.        Abdominal: Soft. She exhibits no distension. There is no tenderness.             Musculoskeletal: Normal range of  motion and moves all extremeties. She exhibits no edema or tenderness.       Neurological: She is alert and oriented to person, place, and time.    Skin: Skin is warm and dry.    Psychiatric: She has a normal mood and affect. Her behavior is normal.

## 2018-04-08 NOTE — ASSESSMENT & PLAN NOTE
- FHT verified on admission  - reports ~10 weeks by 7 week US at [facility], however AUREA puts her at 8.4 weeks

## 2018-04-08 NOTE — NURSING
Called to confirm K+ rider orders. Last potassium drawn yesterday @ 2022.  Explained to resident on call that the patient has not vomited since midnight and she is also receiving cont IVF with 20 meq k+ @125ml/hr.   Dr. FRANK Saul explained that each K+ rider will only bring level by 0.1mmol per rider.  Confirmed with Dr. Saul that she wants us to continue to hang riders.  Will continue to monitor.

## 2018-04-08 NOTE — PROGRESS NOTES
Patient reports improvement in nausea since starting Reglan, however she continues to be unable to tolerate PO, and has been heaving/retching continuously since trying to eat.   After review of ACOG recommendations for treatment of nausea and vomiting in pregnancy, will add methylprednisolone 16mg IVPB q 8 h for a maximum of 3 days, then will transition to PO if there is improvement in symptoms.  Discussed with patient's RN.     Sumeet Potts M.D.  Obstetrics & Gynecology  PGY-1

## 2018-04-09 PROBLEM — O23.41 UTI (URINARY TRACT INFECTION) DURING PREGNANCY, FIRST TRIMESTER: Status: RESOLVED | Noted: 2018-04-09 | Resolved: 2018-04-08

## 2018-04-09 PROBLEM — R00.0 TACHYCARDIA: Status: RESOLVED | Noted: 2018-04-09 | Resolved: 2018-04-08

## 2018-04-09 LAB
ANION GAP SERPL CALC-SCNC: 5 MMOL/L
ANION GAP SERPL CALC-SCNC: 7 MMOL/L
BUN SERPL-MCNC: 6 MG/DL
BUN SERPL-MCNC: 7 MG/DL
CALCIUM SERPL-MCNC: 8.2 MG/DL
CALCIUM SERPL-MCNC: 9.1 MG/DL
CHLORIDE SERPL-SCNC: 103 MMOL/L
CHLORIDE SERPL-SCNC: 106 MMOL/L
CO2 SERPL-SCNC: 25 MMOL/L
CO2 SERPL-SCNC: 26 MMOL/L
CREAT SERPL-MCNC: 0.5 MG/DL
CREAT SERPL-MCNC: 0.6 MG/DL
EST. GFR  (AFRICAN AMERICAN): >60 ML/MIN/1.73 M^2
EST. GFR  (AFRICAN AMERICAN): >60 ML/MIN/1.73 M^2
EST. GFR  (NON AFRICAN AMERICAN): >60 ML/MIN/1.73 M^2
EST. GFR  (NON AFRICAN AMERICAN): >60 ML/MIN/1.73 M^2
GLUCOSE SERPL-MCNC: 85 MG/DL
GLUCOSE SERPL-MCNC: 94 MG/DL
HAV IGM SERPL QL IA: NEGATIVE
HBV CORE IGM SERPL QL IA: NEGATIVE
HBV SURFACE AG SERPL QL IA: NEGATIVE
HCV AB SERPL QL IA: NEGATIVE
HIV 1+2 AB+HIV1 P24 AG SERPL QL IA: NEGATIVE
MAGNESIUM SERPL-MCNC: 1.1 MG/DL
MAGNESIUM SERPL-MCNC: 1.3 MG/DL
PHOSPHATE SERPL-MCNC: 2.2 MG/DL
POTASSIUM SERPL-SCNC: 2.8 MMOL/L
POTASSIUM SERPL-SCNC: 3 MMOL/L
RPR SER QL: NORMAL
SODIUM SERPL-SCNC: 136 MMOL/L
SODIUM SERPL-SCNC: 136 MMOL/L
TSH SERPL DL<=0.005 MIU/L-ACNC: 2.53 UIU/ML

## 2018-04-09 PROCEDURE — 25000003 PHARM REV CODE 250: Performed by: OBSTETRICS & GYNECOLOGY

## 2018-04-09 PROCEDURE — 99232 SBSQ HOSP IP/OBS MODERATE 35: CPT | Mod: ,,, | Performed by: OBSTETRICS & GYNECOLOGY

## 2018-04-09 PROCEDURE — 25000003 PHARM REV CODE 250: Performed by: STUDENT IN AN ORGANIZED HEALTH CARE EDUCATION/TRAINING PROGRAM

## 2018-04-09 PROCEDURE — 63600175 PHARM REV CODE 636 W HCPCS: Performed by: STUDENT IN AN ORGANIZED HEALTH CARE EDUCATION/TRAINING PROGRAM

## 2018-04-09 PROCEDURE — 36415 COLL VENOUS BLD VENIPUNCTURE: CPT

## 2018-04-09 PROCEDURE — 11000001 HC ACUTE MED/SURG PRIVATE ROOM

## 2018-04-09 PROCEDURE — 63700000 PHARM REV CODE 250 ALT 637 W/O HCPCS: Performed by: STUDENT IN AN ORGANIZED HEALTH CARE EDUCATION/TRAINING PROGRAM

## 2018-04-09 PROCEDURE — 83735 ASSAY OF MAGNESIUM: CPT | Mod: 91

## 2018-04-09 PROCEDURE — 80048 BASIC METABOLIC PNL TOTAL CA: CPT

## 2018-04-09 PROCEDURE — 84443 ASSAY THYROID STIM HORMONE: CPT

## 2018-04-09 PROCEDURE — 83735 ASSAY OF MAGNESIUM: CPT

## 2018-04-09 PROCEDURE — 80048 BASIC METABOLIC PNL TOTAL CA: CPT | Mod: 91

## 2018-04-09 PROCEDURE — 84100 ASSAY OF PHOSPHORUS: CPT

## 2018-04-09 RX ORDER — LANOLIN ALCOHOL/MO/W.PET/CERES
400 CREAM (GRAM) TOPICAL EVERY 4 HOURS
Status: COMPLETED | OUTPATIENT
Start: 2018-04-09 | End: 2018-04-09

## 2018-04-09 RX ORDER — SODIUM,POTASSIUM PHOSPHATES 280-250MG
1 POWDER IN PACKET (EA) ORAL
Status: COMPLETED | OUTPATIENT
Start: 2018-04-09 | End: 2018-04-09

## 2018-04-09 RX ORDER — POTASSIUM CHLORIDE 7.45 MG/ML
10 INJECTION INTRAVENOUS
Status: COMPLETED | OUTPATIENT
Start: 2018-04-09 | End: 2018-04-09

## 2018-04-09 RX ORDER — AMOXICILLIN 250 MG
1 CAPSULE ORAL DAILY
Status: DISCONTINUED | OUTPATIENT
Start: 2018-04-09 | End: 2018-04-10 | Stop reason: HOSPADM

## 2018-04-09 RX ORDER — LANOLIN ALCOHOL/MO/W.PET/CERES
400 CREAM (GRAM) TOPICAL EVERY 4 HOURS
Status: DISCONTINUED | OUTPATIENT
Start: 2018-04-09 | End: 2018-04-10

## 2018-04-09 RX ADMIN — DEXTROSE MONOHYDRATE, SODIUM CHLORIDE, AND POTASSIUM CHLORIDE: 50; 9; 1.49 INJECTION, SOLUTION INTRAVENOUS at 01:04

## 2018-04-09 RX ADMIN — STANDARDIZED SENNA CONCENTRATE AND DOCUSATE SODIUM 1 TABLET: 8.6; 5 TABLET, FILM COATED ORAL at 08:04

## 2018-04-09 RX ADMIN — CHLORPROMAZINE HYDROCHLORIDE 25 MG: 25 INJECTION INTRAMUSCULAR at 05:04

## 2018-04-09 RX ADMIN — CHLORPROMAZINE HYDROCHLORIDE 25 MG: 25 INJECTION INTRAMUSCULAR at 08:04

## 2018-04-09 RX ADMIN — CALCIUM CARBONATE 500 MG: 500 TABLET, CHEWABLE ORAL at 08:04

## 2018-04-09 RX ADMIN — DOXYLAMINE SUCCINATE 12.5 MG: 25 TABLET ORAL at 01:04

## 2018-04-09 RX ADMIN — DIPHENHYDRAMINE HYDROCHLORIDE 25 MG: 50 INJECTION, SOLUTION INTRAMUSCULAR; INTRAVENOUS at 12:04

## 2018-04-09 RX ADMIN — POTASSIUM & SODIUM PHOSPHATES POWDER PACK 280-160-250 MG 1 PACKET: 280-160-250 PACK at 05:04

## 2018-04-09 RX ADMIN — Medication 400 MG: at 09:04

## 2018-04-09 RX ADMIN — POTASSIUM & SODIUM PHOSPHATES POWDER PACK 280-160-250 MG 1 PACKET: 280-160-250 PACK at 01:04

## 2018-04-09 RX ADMIN — DEXTROSE MONOHYDRATE, SODIUM CHLORIDE, AND POTASSIUM CHLORIDE: 50; 9; 1.49 INJECTION, SOLUTION INTRAVENOUS at 09:04

## 2018-04-09 RX ADMIN — DOXYLAMINE SUCCINATE 12.5 MG: 25 TABLET ORAL at 02:04

## 2018-04-09 RX ADMIN — FAMOTIDINE 20 MG: 20 TABLET, FILM COATED ORAL at 08:04

## 2018-04-09 RX ADMIN — PRENATAL VIT W/ FE FUMARATE-FA TAB 27-0.8 MG 1 TABLET: 27-0.8 TAB at 08:04

## 2018-04-09 RX ADMIN — PYRIDOXINE HYDROCHLORIDE 25 MG: 100 INJECTION, SOLUTION INTRAMUSCULAR; INTRAVENOUS at 01:04

## 2018-04-09 RX ADMIN — POTASSIUM CHLORIDE 10 MEQ: 7.46 INJECTION, SOLUTION INTRAVENOUS at 01:04

## 2018-04-09 RX ADMIN — METOCLOPRAMIDE 10 MG: 5 INJECTION, SOLUTION INTRAMUSCULAR; INTRAVENOUS at 05:04

## 2018-04-09 RX ADMIN — POTASSIUM CHLORIDE 10 MEQ: 7.46 INJECTION, SOLUTION INTRAVENOUS at 10:04

## 2018-04-09 RX ADMIN — FOLIC ACID: 5 INJECTION, SOLUTION INTRAMUSCULAR; INTRAVENOUS; SUBCUTANEOUS at 10:04

## 2018-04-09 RX ADMIN — Medication 400 MG: at 01:04

## 2018-04-09 RX ADMIN — DOXYLAMINE SUCCINATE 12.5 MG: 25 TABLET ORAL at 08:04

## 2018-04-09 RX ADMIN — PYRIDOXINE HYDROCHLORIDE 25 MG: 100 INJECTION, SOLUTION INTRAMUSCULAR; INTRAVENOUS at 02:04

## 2018-04-09 RX ADMIN — PYRIDOXINE HYDROCHLORIDE 25 MG: 100 INJECTION, SOLUTION INTRAMUSCULAR; INTRAVENOUS at 08:04

## 2018-04-09 RX ADMIN — METOCLOPRAMIDE 10 MG: 5 INJECTION, SOLUTION INTRAMUSCULAR; INTRAVENOUS at 01:04

## 2018-04-09 RX ADMIN — POTASSIUM & SODIUM PHOSPHATES POWDER PACK 280-160-250 MG 1 PACKET: 280-160-250 PACK at 10:04

## 2018-04-09 RX ADMIN — POTASSIUM BICARBONATE 50 MEQ: 25 TABLET, EFFERVESCENT ORAL at 08:04

## 2018-04-09 RX ADMIN — Medication 400 MG: at 10:04

## 2018-04-09 RX ADMIN — Medication 400 MG: at 08:04

## 2018-04-09 RX ADMIN — METOCLOPRAMIDE 10 MG: 5 INJECTION, SOLUTION INTRAMUSCULAR; INTRAVENOUS at 08:04

## 2018-04-09 RX ADMIN — POTASSIUM BICARBONATE 50 MEQ: 25 TABLET, EFFERVESCENT ORAL at 10:04

## 2018-04-09 RX ADMIN — POTASSIUM & SODIUM PHOSPHATES POWDER PACK 280-160-250 MG 1 PACKET: 280-160-250 PACK at 08:04

## 2018-04-09 RX ADMIN — Medication 400 MG: at 05:04

## 2018-04-09 RX ADMIN — CHLORPROMAZINE HYDROCHLORIDE 25 MG: 25 INJECTION INTRAMUSCULAR at 01:04

## 2018-04-09 NOTE — PLAN OF CARE
CM met with pt to follow-up on her initial discharge planning assessment.  Pt informed CM that she would like to continue her care at the [facility].   CM filled out at request for appointment on-line and informed pt that she should expect a call to schedule her apt.  Clinic is located at:  [address] [place] [address].  [place], [place] [id number] [phone].  CM to enter info in Pt instructions in AVS.

## 2018-04-09 NOTE — ASSESSMENT & PLAN NOTE
- FHT verified on admission  - reports ~10 weeks by 7 week US at [facility], however AUREA puts her at 8.4 weeks  - Dating U/S today

## 2018-04-09 NOTE — PROGRESS NOTES
Spoke with Dr. Abdi. Informed him that patient does not want midline inserted till morning. States that she is tired of being stuck.  I informed both Dr. Abdi and patient that if present IV comes out or goes bad, she does not have much left to stick and may be without an IV till morning. Patient verbalized understanding.

## 2018-04-09 NOTE — PLAN OF CARE
See previous discharge planning assessment completed by DOYLE MARINELLI      04/08/18 1953   Discharge Assessment   Assessment Type Discharge Planning Assessment        04/08/18 1953   Discharge Assessment   Assessment Type Discharge Planning Assessment

## 2018-04-09 NOTE — PROGRESS NOTES
Spoke to OB/GYN resident after also speaking with Dr. Potts. Patient has very limited venous access. Was able to get #22g in small antecx vein but will need better access. Order obtained for a midline catheter.

## 2018-04-09 NOTE — PLAN OF CARE
Referral placed to OB Clinic at [facility].  [facility] OB clinic can take up to 72 hours from time of online request to reach out to pt to schedule an appt.  Clinic info placed on AVS.

## 2018-04-09 NOTE — SUBJECTIVE & OBJECTIVE
Interval Hx:    Reports improvement of nausea last night with thorazine. Was able to tolerate salad last night however vomited part of salad early this AM. Reports slight nausea and slight hunger this AM. Is tolerating small amounts of fluids.  Also reports constipation and congestion.   Denies contractions, VB.     Objective:     Vital Signs (Most Recent):  Temp: 98.3 °F (36.8 °C) (04/09/18 0521)  Pulse: 85 (04/09/18 0521)  Resp: 20 (04/09/18 0521)  BP: 119/64 (04/09/18 0521)  SpO2: 100 % (04/09/18 0521) Vital Signs (24h Range):  Temp:  [97.8 °F (36.6 °C)-99.6 °F (37.6 °C)] 98.3 °F (36.8 °C)  Pulse:  [] 85  Resp:  [18-20] 20  SpO2:  [91 %-100 %] 100 %  BP: (105-121)/(59-71) 119/64     Weight: 62.9 kg (138 lb 10.7 oz)  Body mass index is 26.2 kg/m².      Intake/Output Summary (Last 24 hours) at 04/09/18 0631  Last data filed at 04/09/18 0600   Gross per 24 hour   Intake              120 ml   Output                0 ml   Net              120 ml     Significant Labs:    Recent Labs  Lab 04/06/18  0846  04/07/18  0511 04/07/18 2022 04/09/18  0525   *  < > 139 140 136   K 3.8  < > 3.5 3.3* 2.8*   CL 98  < > 115* 110 106   CO2 16*  < > 20* 22* 25   BUN 22*  < > 7 3* 7   CREATININE 0.9  < > 0.6 0.6 0.5   GLU 96  < > 101 90 94   MG 2.2  --   --   --  1.1*   PHOS 3.7  --   --   --  2.2*   < > = values in this interval not displayed.  T&S A POS    GC/CT neg    UCx multiple organisms, none in predominance   GC/CT negative    Physical Exam:   Constitutional: She is oriented to person, place, and time. She appears well-developed and well-nourished. No distress.    HENT:   Head: Normocephalic and atraumatic.    Eyes: EOM are normal.    Neck: Normal range of motion.    Cardiovascular: Regular rhythm.  Exam reveals no gallop.     Pulmonary/Chest: Effort normal and breath sounds normal. No respiratory distress.        Abdominal: Soft. She exhibits no distension. There is no tenderness.             Musculoskeletal:  Normal range of motion and moves all extremeties. She exhibits no edema or tenderness.       Neurological: She is alert and oriented to person, place, and time.    Skin: Skin is warm and dry.    Psychiatric: She has a normal mood and affect. Her behavior is normal.

## 2018-04-10 VITALS
DIASTOLIC BLOOD PRESSURE: 72 MMHG | BODY MASS INDEX: 26.19 KG/M2 | WEIGHT: 138.69 LBS | SYSTOLIC BLOOD PRESSURE: 118 MMHG | OXYGEN SATURATION: 98 % | HEIGHT: 61 IN | HEART RATE: 100 BPM | RESPIRATION RATE: 16 BRPM | TEMPERATURE: 98 F

## 2018-04-10 LAB
AMPHET+METHAMPHET UR QL: NEGATIVE
ANION GAP SERPL CALC-SCNC: 6 MMOL/L
BARBITURATES UR QL SCN>200 NG/ML: NEGATIVE
BENZODIAZ UR QL SCN>200 NG/ML: NEGATIVE
BUN SERPL-MCNC: 5 MG/DL
BZE UR QL SCN: NEGATIVE
CALCIUM SERPL-MCNC: 8.6 MG/DL
CANNABINOIDS UR QL SCN: NORMAL
CHLORIDE SERPL-SCNC: 105 MMOL/L
CO2 SERPL-SCNC: 25 MMOL/L
CREAT SERPL-MCNC: 0.5 MG/DL
CREAT UR-MCNC: 35.7 MG/DL
EST. GFR  (AFRICAN AMERICAN): >60 ML/MIN/1.73 M^2
EST. GFR  (NON AFRICAN AMERICAN): >60 ML/MIN/1.73 M^2
ETHANOL UR-MCNC: <10 MG/DL
GLUCOSE SERPL-MCNC: 82 MG/DL
MAGNESIUM SERPL-MCNC: 1.4 MG/DL
METHADONE UR QL SCN>300 NG/ML: NEGATIVE
OPIATES UR QL SCN: NEGATIVE
PCP UR QL SCN>25 NG/ML: NEGATIVE
PHOSPHATE SERPL-MCNC: 2.2 MG/DL
POTASSIUM SERPL-SCNC: 4 MMOL/L
SODIUM SERPL-SCNC: 136 MMOL/L
TOXICOLOGY INFORMATION: NORMAL

## 2018-04-10 PROCEDURE — 25000003 PHARM REV CODE 250: Performed by: STUDENT IN AN ORGANIZED HEALTH CARE EDUCATION/TRAINING PROGRAM

## 2018-04-10 PROCEDURE — 83735 ASSAY OF MAGNESIUM: CPT

## 2018-04-10 PROCEDURE — 36415 COLL VENOUS BLD VENIPUNCTURE: CPT

## 2018-04-10 PROCEDURE — 80048 BASIC METABOLIC PNL TOTAL CA: CPT

## 2018-04-10 PROCEDURE — 25000003 PHARM REV CODE 250: Performed by: OBSTETRICS & GYNECOLOGY

## 2018-04-10 PROCEDURE — 63700000 PHARM REV CODE 250 ALT 637 W/O HCPCS: Performed by: STUDENT IN AN ORGANIZED HEALTH CARE EDUCATION/TRAINING PROGRAM

## 2018-04-10 PROCEDURE — 80307 DRUG TEST PRSMV CHEM ANLYZR: CPT

## 2018-04-10 PROCEDURE — 84100 ASSAY OF PHOSPHORUS: CPT

## 2018-04-10 RX ORDER — SODIUM,POTASSIUM PHOSPHATES 280-250MG
1 POWDER IN PACKET (EA) ORAL EVERY 4 HOURS
Status: DISCONTINUED | OUTPATIENT
Start: 2018-04-10 | End: 2018-04-10 | Stop reason: HOSPADM

## 2018-04-10 RX ORDER — CHLORPROMAZINE HYDROCHLORIDE 25 MG/1
25 TABLET, FILM COATED ORAL 4 TIMES DAILY PRN
Status: DISCONTINUED | OUTPATIENT
Start: 2018-04-10 | End: 2018-04-10

## 2018-04-10 RX ORDER — LANOLIN ALCOHOL/MO/W.PET/CERES
50 CREAM (GRAM) TOPICAL EVERY 6 HOURS PRN
Qty: 60 TABLET | Refills: 3 | Status: ON HOLD | OUTPATIENT
Start: 2018-04-10 | End: 2018-05-21 | Stop reason: HOSPADM

## 2018-04-10 RX ORDER — FAMOTIDINE 20 MG/1
20 TABLET, FILM COATED ORAL 2 TIMES DAILY
Qty: 60 TABLET | Refills: 11 | Status: SHIPPED | OUTPATIENT
Start: 2018-04-10 | End: 2018-04-10

## 2018-04-10 RX ORDER — LANOLIN ALCOHOL/MO/W.PET/CERES
400 CREAM (GRAM) TOPICAL EVERY 4 HOURS
Status: DISCONTINUED | OUTPATIENT
Start: 2018-04-10 | End: 2018-04-10 | Stop reason: HOSPADM

## 2018-04-10 RX ORDER — FAMOTIDINE 20 MG/1
20 TABLET, FILM COATED ORAL 2 TIMES DAILY
Qty: 60 TABLET | Refills: 11 | Status: SHIPPED | OUTPATIENT
Start: 2018-04-10 | End: 2018-05-29 | Stop reason: SDUPTHER

## 2018-04-10 RX ORDER — METOCLOPRAMIDE 10 MG/1
10 TABLET ORAL
Qty: 60 TABLET | Refills: 0 | Status: SHIPPED | OUTPATIENT
Start: 2018-04-10 | End: 2018-04-26

## 2018-04-10 RX ORDER — PYRIDOXINE HCL (VITAMIN B6) 25 MG
50 TABLET ORAL EVERY 6 HOURS
Status: DISCONTINUED | OUTPATIENT
Start: 2018-04-10 | End: 2018-04-10 | Stop reason: HOSPADM

## 2018-04-10 RX ORDER — METOCLOPRAMIDE 10 MG/1
10 TABLET ORAL
Status: DISCONTINUED | OUTPATIENT
Start: 2018-04-10 | End: 2018-04-10 | Stop reason: HOSPADM

## 2018-04-10 RX ADMIN — METOCLOPRAMIDE 10 MG: 10 TABLET ORAL at 10:04

## 2018-04-10 RX ADMIN — METOCLOPRAMIDE 10 MG: 10 TABLET ORAL at 08:04

## 2018-04-10 RX ADMIN — Medication 400 MG: at 10:04

## 2018-04-10 RX ADMIN — POTASSIUM BICARBONATE 50 MEQ: 25 TABLET, EFFERVESCENT ORAL at 01:04

## 2018-04-10 RX ADMIN — Medication 400 MG: at 08:04

## 2018-04-10 RX ADMIN — PRENATAL VIT W/ FE FUMARATE-FA TAB 27-0.8 MG 1 TABLET: 27-0.8 TAB at 08:04

## 2018-04-10 RX ADMIN — FAMOTIDINE 20 MG: 20 TABLET, FILM COATED ORAL at 08:04

## 2018-04-10 RX ADMIN — Medication 400 MG: at 06:04

## 2018-04-10 RX ADMIN — Medication 50 MG: at 08:04

## 2018-04-10 RX ADMIN — Medication 400 MG: at 02:04

## 2018-04-10 RX ADMIN — DOXYLAMINE SUCCINATE 12.5 MG: 25 TABLET ORAL at 02:04

## 2018-04-10 RX ADMIN — POTASSIUM & SODIUM PHOSPHATES POWDER PACK 280-160-250 MG 1 PACKET: 280-160-250 PACK at 10:04

## 2018-04-10 RX ADMIN — POTASSIUM & SODIUM PHOSPHATES POWDER PACK 280-160-250 MG 1 PACKET: 280-160-250 PACK at 02:04

## 2018-04-10 RX ADMIN — POTASSIUM & SODIUM PHOSPHATES POWDER PACK 280-160-250 MG 1 PACKET: 280-160-250 PACK at 08:04

## 2018-04-10 RX ADMIN — STANDARDIZED SENNA CONCENTRATE AND DOCUSATE SODIUM 1 TABLET: 8.6; 5 TABLET, FILM COATED ORAL at 08:04

## 2018-04-10 RX ADMIN — DOXYLAMINE SUCCINATE 12.5 MG: 25 TABLET ORAL at 01:04

## 2018-04-10 RX ADMIN — Medication 400 MG: at 01:04

## 2018-04-10 RX ADMIN — DOXYLAMINE SUCCINATE 12.5 MG: 25 TABLET ORAL at 08:04

## 2018-04-10 NOTE — SUBJECTIVE & OBJECTIVE
Interval Hx:    Tolerated all three meals yesterday. Denies emesis overnight, no complaints of nausea this AM.  She did lost IV access last night but reports able to tolerate PO and would like to trial today without IV if possible.   Had BM yesterday. Denies contractions, denies VB.     Objective:     Vital Signs (Most Recent):  Temp: 97.9 °F (36.6 °C) (04/10/18 0348)  Pulse: 100 (04/10/18 0348)  Resp: 18 (04/09/18 2037)  BP: (!) 107/59 (04/10/18 0348)  SpO2: 98 % (04/10/18 0348) Vital Signs (24h Range):  Temp:  [96 °F (35.6 °C)-98.6 °F (37 °C)] 97.9 °F (36.6 °C)  Pulse:  [] 100  Resp:  [18] 18  SpO2:  [98 %-100 %] 98 %  BP: (107-125)/(59-79) 107/59     Weight: 62.9 kg (138 lb 10.7 oz)  Body mass index is 26.2 kg/m².      Intake/Output Summary (Last 24 hours) at 04/10/18 0638  Last data filed at 04/10/18 0600   Gross per 24 hour   Intake             1025 ml   Output             1200 ml   Net             -175 ml     Significant Labs:    Recent Labs  Lab 04/06/18  0846  04/09/18  0525 04/09/18  1652 04/10/18  0451   *  < > 136 136 136   K 3.8  < > 2.8* 3.0* 4.0   CL 98  < > 106 103 105   CO2 16*  < > 25 26 25   BUN 22*  < > 7 6 5*   CREATININE 0.9  < > 0.5 0.6 0.5   GLU 96  < > 94 85 82   MG 2.2  --  1.1* 1.3* 1.4*   PHOS 3.7  --  2.2*  --  2.2*   < > = values in this interval not displayed.  T&S A POS  UCx multiple organisms, none in predominance   GC/CT negative    Physical Exam:   Constitutional: She is oriented to person, place, and time. She appears well-developed and well-nourished. No distress.    HENT:   Head: Normocephalic and atraumatic.    Eyes: EOM are normal.    Neck: Normal range of motion.    Cardiovascular: Regular rhythm.  Exam reveals no gallop.     Pulmonary/Chest: Effort normal and breath sounds normal. No respiratory distress.        Abdominal: Soft. She exhibits no distension. There is no tenderness.             Musculoskeletal: Normal range of motion and moves all extremeties. She  exhibits no edema or tenderness.       Neurological: She is alert and oriented to person, place, and time.    Skin: Skin is warm and dry.    Psychiatric: She has a normal mood and affect. Her behavior is normal.

## 2018-04-10 NOTE — ASSESSMENT & PLAN NOTE
- Symptoms markedly improved yesterday  - s/p 2l DG NS bolus  - previously on D5 NS w/ 20mEq KCl @ 125 mL fluid  - lost IV access overnight  - Will transition to PO medication today to include:   - PO doxylamine and B6 scheduled q6  - PO Reglan TID  - Thorazine 25mg q6 hrs  - pepcid BID  - Replace electrolytes PRN

## 2018-04-10 NOTE — PLAN OF CARE
Pt discharging home today. CM informed pt that [facility] should call her to schedule an appointment.  [facility] information in pt's AVS. Pt states nursing is getting her a cab voucher for ride home.  No further CM needs for discharge     04/10/18 2444   Final Note   Assessment Type Final Discharge Note   Discharge Disposition Home   What phone number can be called within the next 1-3 days to see how you are doing after discharge? [phone]   Hospital Follow Up  Appt(s) scheduled? Yes   Discharge plans and expectations educations in teach back method with documentation complete? Yes   Right Care Referral Info   Post Acute Recommendation No Care

## 2018-04-10 NOTE — ASSESSMENT & PLAN NOTE
- no further bleeding since admission   - T&S A POS -rhogam not indicated  - continue to monitor for further bleeding

## 2018-04-10 NOTE — NURSING
Spoke with Wild on call for OBGYN    Notified and informed of loss of IV access for patient    No c/o N/V at this time     Instructed to chart against any IV meds and allow day team to assume care in am for further instructions. Possibly PICC line needed       No further orders at this time  Will cont to monitor

## 2018-04-10 NOTE — PROGRESS NOTES
Discharge instructions reviewed with patient, all questions answered.   New medications and follow up appointments discussed with patient.  Side effects of medications discussed.  Hyperemesis gravidarum education provided to patient.  Patient verbalized understanding.  Cab voucher requested for patient.

## 2018-04-10 NOTE — PLAN OF CARE
Problem: Patient Care Overview  Goal: Plan of Care Review  Outcome: Ongoing (interventions implemented as appropriate)  Patient resting in bed at this time, no injuries reported, bed in locked and lowest position, nightlight on, non skid footwear, call light in reach, all important items within reach, instructed to call as needed, bed alarm used as needed    bilat upper extrem edema R>L  IV access loss at this time, on call [facility] for OBGYN alerted and notified, chart against any IV meds at this time, will address with day team  No N/V reported  No pain reported        No further complaints or concerns at this time  Will cont to monitor

## 2018-04-10 NOTE — PROGRESS NOTES
"[facility]  Obstetrics  Antepartum Progress Note    Patient Name: Daija Purvis  MRN: [id number]  Admission Date: 2018  Hospital Length of Stay: 2 days  Attending Physician: Sowmya Arndt DO  Primary Care Provider: Lizet Abreu MD    Subjective:     Principal Problem:Hyperemesis gravidarum    HPI:  Daija Purvis is a 27 y.o.  at approximately 10 weeks GA. Patient gives a h/o LMP 18, but says she was given an AUREA of 18 at her first prenatal visit. She reports constant nausea and vomiting for the past three weeks, and has been unable to keep food or liquid down for the past 2 weeks. She reports significant weight loss, per patient, "like 30 pounds." She also says for the past 3 days she passes out at least once a day - when walking around her house, vision goes black and she loses consciousness. She also has decreased appetite and infrequent bowel movements for the last several weeks. She was seen here last week for n/v, given phenergan/zofran suppositories. She reports no improvement. She also had one small (dime-sized) clot passed while she was urinating yesterday, but no bleeding prior or since.   She saw an OB at [facility] but has not yet had first trimester screenings and would like to transfer care to [facility].   She was diagnosed with UTI at first prenatal visit but has been unable to tolerate the medication. She denies dysuria currently.    Hospital Course:  2018 - Admitted for antiemetics and electrolyte management for hyperemesis gravidarum. Reported UTI that was not treated prior to admission. Found to have urine + for trichomonas. Given rocephin and flagyl. No N/V overnight with scheduled B6/doxylamine. Tolerated clears.  2018 - Reports 3 episodes of vomiting this AM. Reports improvement of symptoms with reglan, changed to scheduled TID. Phenergan 12.5mg added on QID. 4 more episodes throughout the day, last episode before " bed.  04/08/2018 - Slept well, no episodes of emesis overnight. Reports continued nausea but improvement in symptoms.  04/09/2018- Reports improvement of nausea last night with thorazine. Was able to tolerate salad last night however vomited part of salad early this AM. Reports slight nausea and slight hunger this AM. Is tolerating small amounts of fluids.   04/10/2018 Tolerated all three meals yesterday. Denies nausea this AM. Transition to PO antiemetics today.     Interval Hx:    Tolerated all three meals yesterday. Denies emesis overnight, no complaints of nausea this AM.  She did lost IV access last night but reports able to tolerate PO and would like to trial today without IV if possible.   Had BM yesterday. Denies contractions, denies VB.     Objective:     Vital Signs (Most Recent):  Temp: 97.9 °F (36.6 °C) (04/10/18 0348)  Pulse: 100 (04/10/18 0348)  Resp: 18 (04/09/18 2037)  BP: (!) 107/59 (04/10/18 0348)  SpO2: 98 % (04/10/18 0348) Vital Signs (24h Range):  Temp:  [96 °F (35.6 °C)-98.6 °F (37 °C)] 97.9 °F (36.6 °C)  Pulse:  [] 100  Resp:  [18] 18  SpO2:  [98 %-100 %] 98 %  BP: (107-125)/(59-79) 107/59     Weight: 62.9 kg (138 lb 10.7 oz)  Body mass index is 26.2 kg/m².      Intake/Output Summary (Last 24 hours) at 04/10/18 0638  Last data filed at 04/10/18 0600   Gross per 24 hour   Intake             1025 ml   Output             1200 ml   Net             -175 ml     Significant Labs:    Recent Labs  Lab 04/06/18  0846  04/09/18  0525 04/09/18  1652 04/10/18  0451   *  < > 136 136 136   K 3.8  < > 2.8* 3.0* 4.0   CL 98  < > 106 103 105   CO2 16*  < > 25 26 25   BUN 22*  < > 7 6 5*   CREATININE 0.9  < > 0.5 0.6 0.5   GLU 96  < > 94 85 82   MG 2.2  --  1.1* 1.3* 1.4*   PHOS 3.7  --  2.2*  --  2.2*   < > = values in this interval not displayed.  T&S A POS  UCx multiple organisms, none in predominance   GC/CT negative    Physical Exam:   Constitutional: She is oriented to person, place, and time.  She appears well-developed and well-nourished. No distress.    HENT:   Head: Normocephalic and atraumatic.    Eyes: EOM are normal.    Neck: Normal range of motion.    Cardiovascular: Regular rhythm.  Exam reveals no gallop.     Pulmonary/Chest: Effort normal and breath sounds normal. No respiratory distress.        Abdominal: Soft. She exhibits no distension. There is no tenderness.             Musculoskeletal: Normal range of motion and moves all extremeties. She exhibits no edema or tenderness.       Neurological: She is alert and oriented to person, place, and time.    Skin: Skin is warm and dry.    Psychiatric: She has a normal mood and affect. Her behavior is normal.       Assessment/Plan:     27 y.o. female  at Unknown for:    * Hyperemesis gravidarum    - Symptoms markedly improved yesterday  - s/p 2l DG NS bolus  - previously on D5 NS w/ 20mEq KCl @ 125 mL fluid  - lost IV access overnight  - Will transition to PO medication today to include:   - PO doxylamine and B6 scheduled q6  - PO Reglan TID  - Thorazine 25mg q6 hrs  - pepcid BID  - Replace electrolytes PRN        Vaginal bleeding affecting early pregnancy    - no further bleeding since admission   - T&S A POS -rhogam not indicated  - continue to monitor for further bleeding        Trichimoniasis    - On urine in ED, discussed with patient including need for partner treatment  - s/p Flagyl IV 2g  - GC/CT negative  - HIV, RPR, Hep panel pending        First trimester pregnancy    - Dating U/S 12w1d per U/S                Jessica Herrera MD  Obstetrics  [facility]

## 2018-04-10 NOTE — PLAN OF CARE
CM spoke with pt for reassessment. Pt states she thinks she will discharge soon and will have no CM needs.  CM to follow.     04/10/18 6579   Discharge Assessment   Assessment Type Discharge Planning Reassessment   Confirmed/corrected address and phone number on facesheet? Yes   Assessment information obtained from? Patient;Medical Record   Prior to hospitilization cognitive status: Alert/Oriented   Prior to hospitalization functional status: Independent   Current cognitive status: Alert/Oriented   Current Functional Status: Independent   Lives With other (see comments)   Able to Return to Prior Arrangements yes   Is patient able to care for self after discharge? Yes   Patient's perception of discharge disposition home or selfcare   Patient currently being followed by outpatient case management? No   Patient currently receives any other outside agency services? No   Equipment Currently Used at Home none   Do you have any problems affording any of your prescribed medications? TBD   Is the patient taking medications as prescribed? yes   Does the patient have transportation home? Yes   Transportation Available family or friend will provide   Does the patient receive services at the [facility]? No   Discharge Plan A Home   Discharge Plan B Home   Patient/Family In Agreement With Plan yes

## 2018-04-10 NOTE — DISCHARGE SUMMARY
"[facility]  Obstetrics  Discharge Summary      Patient Name: Daija Purvis  MRN: [id number]  Admission Date: 2018  Hospital Length of Stay: 2 days  Discharge Date and Time:  04/10/2018 3:13 PM  Attending Physician: Sowmya Arndt DO   Discharging Provider: Jessica Herrera MD  Primary Care Provider: Lizet Abreu MD    HPI: Daija Purvis is a 27 y.o.  at approximately 10 weeks GA. Patient gives a h/o LMP 18, but says she was given an AUREA of 18 at her first prenatal visit. She reports constant nausea and vomiting for the past three weeks, and has been unable to keep food or liquid down for the past 2 weeks. She reports significant weight loss, per patient, "like 30 pounds." She also says for the past 3 days she passes out at least once a day - when walking around her house, vision goes black and she loses consciousness. She also has decreased appetite and infrequent bowel movements for the last several weeks. She was seen here last week for n/v, given phenergan/zofran suppositories. She reports no improvement. She also had one small (dime-sized) clot passed while she was urinating yesterday, but no bleeding prior or since.   She saw an OB at [facility] but has not yet had first trimester screenings and would like to transfer care to [facility].   She was diagnosed with UTI at first prenatal visit but has been unable to tolerate the medication. She denies dysuria currently.    * No surgery found *     Hospital Course:   2018 - Admitted for antiemetics and electrolyte management for hyperemesis gravidarum. Reported UTI that was not treated prior to admission. Found to have urine + for trichomonas. Given rocephin and flagyl. No N/V overnight with scheduled B6/doxylamine. Tolerated clears.  2018 - Reports 3 episodes of vomiting this AM. Reports improvement of symptoms with reglan, changed to scheduled TID. Phenergan 12.5mg added on QID. 4 more episodes " throughout the day, last episode before bed.  04/08/2018 - Slept well, no episodes of emesis overnight. Reports continued nausea but improvement in symptoms.  04/09/2018- Reports improvement of nausea last night with thorazine. Was able to tolerate salad last night however vomited part of salad early this AM. Reports slight nausea and slight hunger this AM. Is tolerating small amounts of fluids.   04/10/2018 Tolerated all three meals yesterday. Denies nausea this AM. Transition to PO antiemetics today.   Patient continuing to tolerate PO today on PO antiemetics. Will send patient home on PO doxylamine, Vit B6, reglan, and pepcid.  UDS does indicated THC use however patient states she has not used THC since she found out she was pregnant. Counseled on cyclic effects of THC use and N/V.  Patient voiced understanding and is comfortable with discharge. She is to follow up with [facility] in 1 week for continued prenatal care.           Final Active Diagnoses:    Diagnosis Date Noted POA    PRINCIPAL PROBLEM:  Hyperemesis gravidarum [O21.0] 04/06/2018 Unknown    First trimester pregnancy [Z34.90] 04/06/2018 Not Applicable    Trichimoniasis [A59.9] 04/06/2018 Unknown    Vaginal bleeding affecting early pregnancy [O20.8] 04/06/2018 Unknown      Problems Resolved During this Admission:    Diagnosis Date Noted Date Resolved POA    UTI (urinary tract infection) during pregnancy, first trimester [O23.41] 04/09/2018 04/08/2018 Yes    Tachycardia [R00.0] 04/09/2018 04/08/2018 No        Labs:   BMP:   Recent Labs  Lab 04/09/18  0525 04/09/18  1652 04/10/18  0451   GLU 94 85 82    136 136   K 2.8* 3.0* 4.0    103 105   CO2 25 26 25   BUN 7 6 5*   CREATININE 0.5 0.6 0.5   CALCIUM 8.2* 9.1 8.6*   MG 1.1* 1.3* 1.4*   , CMP   Recent Labs  Lab 04/09/18  0525 04/09/18  1652 04/10/18  0451    136 136   K 2.8* 3.0* 4.0    103 105   CO2 25 26 25   GLU 94 85 82   BUN 7 6 5*   CREATININE 0.5 0.6 0.5   CALCIUM 8.2* 9.1  8.6*   ANIONGAP 5* 7* 6*   ESTGFRAFRICA >60 >60 >60   EGFRNONAA >60 >60 >60    and CBC No results for input(s): WBC, HGB, HCT, PLT in the last 48 hours.        Immunizations     None          This patient has no babies on file.  Pending Diagnostic Studies:     None          Discharged Condition: good    Disposition: Home or Self Care    Follow Up:  Follow-up Information     [facility] In 1 week.    Specialty:  Obstetrics and Gynecology  Why:  Prenatal appointment  Contact information:  [address] [facility] [address] [phone]               Patient Instructions:     Activity as tolerated     Notify your health care provider if you experience any of the following:  increased confusion or weakness     Notify your health care provider if you experience any of the following:  persistent dizziness, light-headedness, or visual disturbances     Notify your health care provider if you experience any of the following:  worsening rash     Notify your health care provider if you experience any of the following:  severe persistent headache     Notify your health care provider if you experience any of the following:  difficulty breathing or increased cough     Notify your health care provider if you experience any of the following:  redness, tenderness, or signs of infection (pain, swelling, redness, odor or green/yellow discharge around incision site)     Notify your health care provider if you experience any of the following:  severe uncontrolled pain     Notify your health care provider if you experience any of the following:  persistent nausea and vomiting or diarrhea     Notify your health care provider if you experience any of the following:  temperature >100.4       Medications:  Current Discharge Medication List      START taking these medications    Details   doxylamine succinate 25 mg tablet Take 1 tablet (25 mg total) by mouth every 6 (six) hours as needed (nausea).  Qty: 60  tablet, Refills: 3    Associated Diagnoses: Hyperemesis gravidarum      metoclopramide HCl (REGLAN) 10 MG tablet Take 1 tablet (10 mg total) by mouth 3 (three) times daily before meals.  Qty: 60 tablet, Refills: 0    Associated Diagnoses: Hyperemesis gravidarum      pyridoxine, vitamin B6, (B-6) 50 MG Tab Take 1 tablet (50 mg total) by mouth every 6 (six) hours as needed (nausea).  Qty: 60 tablet, Refills: 3    Associated Diagnoses: Hyperemesis gravidarum         CONTINUE these medications which have CHANGED    Details   famotidine (PEPCID) 20 MG tablet Take 1 tablet (20 mg total) by mouth 2 (two) times daily.  Qty: 60 tablet, Refills: 11    Associated Diagnoses: Hyperemesis gravidarum         STOP taking these medications       doxylamine-pyridoxine, vit B6, 10-10 mg TbEC Comments:   Reason for Stopping:         ONDANSETRON (ZOFRAN ODT ORAL) Comments:   Reason for Stopping:         promethazine (PHENERGAN) 25 MG suppository Comments:   Reason for Stopping:         sucralfate (CARAFATE) 100 mg/mL suspension Comments:   Reason for Stopping:               Jessica Herrera MD  Obstetrics [facility]

## 2018-04-25 PROBLEM — Z34.91 FIRST TRIMESTER PREGNANCY: Status: RESOLVED | Noted: 2018-04-06 | Resolved: 2018-04-25

## 2018-04-25 PROBLEM — O20.9 VAGINAL BLEEDING AFFECTING EARLY PREGNANCY: Status: RESOLVED | Noted: 2018-04-06 | Resolved: 2018-04-25

## 2018-04-25 PROBLEM — A59.9 TRICHIMONIASIS: Status: RESOLVED | Noted: 2018-04-06 | Resolved: 2018-04-25

## 2018-04-26 ENCOUNTER — INITIAL PRENATAL (OUTPATIENT)
Dept: OBSTETRICS AND GYNECOLOGY | Facility: CLINIC | Age: 28
End: 2018-04-26
Payer: MEDICAID

## 2018-04-26 VITALS
WEIGHT: 160.06 LBS | DIASTOLIC BLOOD PRESSURE: 70 MMHG | BODY MASS INDEX: 30.24 KG/M2 | SYSTOLIC BLOOD PRESSURE: 110 MMHG

## 2018-04-26 DIAGNOSIS — Z34.02 ENCOUNTER FOR SUPERVISION OF NORMAL FIRST PREGNANCY IN SECOND TRIMESTER: ICD-10-CM

## 2018-04-26 PROBLEM — Z34.00 PREGNANCY, SUPERVISION, NORMAL, FIRST: Status: ACTIVE | Noted: 2018-04-26

## 2018-04-26 PROCEDURE — 99213 OFFICE O/P EST LOW 20 MIN: CPT | Mod: TH,S$PBB,, | Performed by: STUDENT IN AN ORGANIZED HEALTH CARE EDUCATION/TRAINING PROGRAM

## 2018-04-26 PROCEDURE — 99212 OFFICE O/P EST SF 10 MIN: CPT | Mod: PBBFAC,TH,PO | Performed by: STUDENT IN AN ORGANIZED HEALTH CARE EDUCATION/TRAINING PROGRAM

## 2018-04-26 PROCEDURE — 99999 PR PBB SHADOW E&M-EST. PATIENT-LVL II: CPT | Mod: PBBFAC,,, | Performed by: STUDENT IN AN ORGANIZED HEALTH CARE EDUCATION/TRAINING PROGRAM

## 2018-04-26 NOTE — PROGRESS NOTES
Complaints today: n/v, improved from hospital    /70   Wt 72.6 kg (160 lb 0.9 oz)   LMP 2018   BMI 30.24 kg/m²     27 y.o., at 14w4d by Estimated Date of Delivery: 10/21/18  Patient Active Problem List   Diagnosis    Hyperemesis gravidarum    Pregnancy, supervision, normal, first     OB History    Para Term  AB Living   1             SAB TAB Ectopic Multiple Live Births                  # Outcome Date GA Lbr Lan/2nd Weight Sex Delivery Anes PTL Lv   1 Current                   Dating reviewed    Allergies and problem list reviewed and updated    Medical and surgical history reviewed    Prenatal labs reviewed and updated    Physical Exam:  ABD: soft, gravid, nontender,     Assessment:  Daija was seen today for routine prenatal visit.    Diagnoses and all orders for this visit:    Encounter for supervision of normal first pregnancy in second trimester  -     US MFM Procedure (Viewpoint); Future         Plan:   Anatomy ordered.   follow up 4 Weeks, bleeding/pain precautions kick counts, labor precautions

## 2018-04-30 ENCOUNTER — HOSPITAL ENCOUNTER (EMERGENCY)
Facility: OTHER | Age: 28
Discharge: HOME | End: 2018-04-30
Attending: EMERGENCY MEDICINE
Payer: MEDICAID

## 2018-04-30 ENCOUNTER — PATIENT MESSAGE (OUTPATIENT)
Dept: OBSTETRICS AND GYNECOLOGY | Facility: CLINIC | Age: 28
End: 2018-04-30

## 2018-04-30 VITALS
WEIGHT: 155 LBS | RESPIRATION RATE: 21 BRPM | BODY MASS INDEX: 29.27 KG/M2 | TEMPERATURE: 97 F | OXYGEN SATURATION: 100 % | SYSTOLIC BLOOD PRESSURE: 107 MMHG | DIASTOLIC BLOOD PRESSURE: 59 MMHG | HEART RATE: 78 BPM | HEIGHT: 61 IN

## 2018-04-30 DIAGNOSIS — E86.0 DEHYDRATION: Primary | ICD-10-CM

## 2018-04-30 DIAGNOSIS — R40.20 LOSS OF CONSCIOUSNESS: ICD-10-CM

## 2018-04-30 DIAGNOSIS — O21.1 HYPEREMESIS GRAVIDARUM BEFORE END OF 22 WEEK GESTATION WITH DEHYDRATION: ICD-10-CM

## 2018-04-30 LAB
AMPHET+METHAMPHET UR QL: NEGATIVE
ANION GAP SERPL CALC-SCNC: 14 MMOL/L
B-HCG UR QL: POSITIVE
BACTERIA #/AREA URNS HPF: ABNORMAL /HPF
BARBITURATES UR QL SCN>200 NG/ML: NEGATIVE
BASOPHILS # BLD AUTO: 0.01 K/UL
BASOPHILS NFR BLD: 0.2 %
BENZODIAZ UR QL SCN>200 NG/ML: NEGATIVE
BILIRUB UR QL STRIP: ABNORMAL
BUN SERPL-MCNC: 12 MG/DL
BZE UR QL SCN: NEGATIVE
CALCIUM SERPL-MCNC: 10.5 MG/DL
CANNABINOIDS UR QL SCN: NORMAL
CHLORIDE SERPL-SCNC: 104 MMOL/L
CLARITY UR: ABNORMAL
CO2 SERPL-SCNC: 17 MMOL/L
COLOR UR: YELLOW
CREAT SERPL-MCNC: 0.7 MG/DL
CREAT UR-MCNC: 202.7 MG/DL
CTP QC/QA: YES
DIFFERENTIAL METHOD: ABNORMAL
EOSINOPHIL # BLD AUTO: 0 K/UL
EOSINOPHIL NFR BLD: 0 %
ERYTHROCYTE [DISTWIDTH] IN BLOOD BY AUTOMATED COUNT: 13.4 %
EST. GFR  (AFRICAN AMERICAN): >60 ML/MIN/1.73 M^2
EST. GFR  (NON AFRICAN AMERICAN): >60 ML/MIN/1.73 M^2
GLUCOSE SERPL-MCNC: 91 MG/DL
GLUCOSE UR QL STRIP: NEGATIVE
HCT VFR BLD AUTO: 38.5 %
HGB BLD-MCNC: 13.5 G/DL
HGB UR QL STRIP: NEGATIVE
HYALINE CASTS #/AREA URNS LPF: 0 /LPF
KETONES UR QL STRIP: ABNORMAL
LEUKOCYTE ESTERASE UR QL STRIP: ABNORMAL
LYMPHOCYTES # BLD AUTO: 1.5 K/UL
LYMPHOCYTES NFR BLD: 27.8 %
MCH RBC QN AUTO: 32.1 PG
MCHC RBC AUTO-ENTMCNC: 35.1 G/DL
MCV RBC AUTO: 91 FL
METHADONE UR QL SCN>300 NG/ML: NEGATIVE
MICROSCOPIC COMMENT: ABNORMAL
MONOCYTES # BLD AUTO: 0.3 K/UL
MONOCYTES NFR BLD: 6.4 %
NEUTROPHILS # BLD AUTO: 3.5 K/UL
NEUTROPHILS NFR BLD: 65.4 %
NITRITE UR QL STRIP: NEGATIVE
OPIATES UR QL SCN: NEGATIVE
PCP UR QL SCN>25 NG/ML: NEGATIVE
PH UR STRIP: 6 [PH] (ref 5–8)
PLATELET # BLD AUTO: 251 K/UL
PMV BLD AUTO: 10.3 FL
POCT GLUCOSE: 81 MG/DL (ref 70–110)
POTASSIUM SERPL-SCNC: 3.8 MMOL/L
PROT UR QL STRIP: ABNORMAL
RBC # BLD AUTO: 4.21 M/UL
RBC #/AREA URNS HPF: 3 /HPF (ref 0–4)
SODIUM SERPL-SCNC: 135 MMOL/L
SP GR UR STRIP: 1.02 (ref 1–1.03)
SQUAMOUS #/AREA URNS HPF: 17 /HPF
TOXICOLOGY INFORMATION: NORMAL
TRICHOMONAS UR QL MICRO: ABNORMAL
URN SPEC COLLECT METH UR: ABNORMAL
UROBILINOGEN UR STRIP-ACNC: 1 EU/DL
WBC # BLD AUTO: 5.28 K/UL
WBC #/AREA URNS HPF: 23 /HPF (ref 0–5)

## 2018-04-30 PROCEDURE — 82962 GLUCOSE BLOOD TEST: CPT

## 2018-04-30 PROCEDURE — S0028 INJECTION, FAMOTIDINE, 20 MG: HCPCS | Performed by: EMERGENCY MEDICINE

## 2018-04-30 PROCEDURE — 96365 THER/PROPH/DIAG IV INF INIT: CPT

## 2018-04-30 PROCEDURE — 85025 COMPLETE CBC W/AUTO DIFF WBC: CPT

## 2018-04-30 PROCEDURE — 96375 TX/PRO/DX INJ NEW DRUG ADDON: CPT

## 2018-04-30 PROCEDURE — 81000 URINALYSIS NONAUTO W/SCOPE: CPT

## 2018-04-30 PROCEDURE — 81025 URINE PREGNANCY TEST: CPT | Performed by: EMERGENCY MEDICINE

## 2018-04-30 PROCEDURE — 25000003 PHARM REV CODE 250: Performed by: EMERGENCY MEDICINE

## 2018-04-30 PROCEDURE — 80048 BASIC METABOLIC PNL TOTAL CA: CPT

## 2018-04-30 PROCEDURE — 99284 EMERGENCY DEPT VISIT MOD MDM: CPT | Mod: 25

## 2018-04-30 PROCEDURE — 93010 ELECTROCARDIOGRAM REPORT: CPT | Mod: ,,, | Performed by: INTERNAL MEDICINE

## 2018-04-30 PROCEDURE — 63600175 PHARM REV CODE 636 W HCPCS: Performed by: EMERGENCY MEDICINE

## 2018-04-30 PROCEDURE — 93005 ELECTROCARDIOGRAM TRACING: CPT

## 2018-04-30 PROCEDURE — 80307 DRUG TEST PRSMV CHEM ANLYZR: CPT

## 2018-04-30 RX ORDER — FAMOTIDINE 10 MG/ML
20 INJECTION INTRAVENOUS
Status: COMPLETED | OUTPATIENT
Start: 2018-04-30 | End: 2018-04-30

## 2018-04-30 RX ORDER — SODIUM CHLORIDE 9 MG/ML
125 INJECTION, SOLUTION INTRAVENOUS ONCE
Status: COMPLETED | OUTPATIENT
Start: 2018-04-30 | End: 2018-04-30

## 2018-04-30 RX ORDER — METOCLOPRAMIDE HYDROCHLORIDE 5 MG/ML
10 INJECTION INTRAMUSCULAR; INTRAVENOUS
Status: DISCONTINUED | OUTPATIENT
Start: 2018-04-30 | End: 2018-04-30

## 2018-04-30 RX ORDER — ONDANSETRON 4 MG/1
4 TABLET, FILM COATED ORAL EVERY 6 HOURS PRN
Qty: 15 TABLET | Refills: 0 | Status: ON HOLD | OUTPATIENT
Start: 2018-04-30 | End: 2018-05-21

## 2018-04-30 RX ADMIN — PROMETHAZINE HYDROCHLORIDE 12.5 MG: 25 INJECTION INTRAMUSCULAR; INTRAVENOUS at 12:04

## 2018-04-30 RX ADMIN — FAMOTIDINE 20 MG: 10 INJECTION, SOLUTION INTRAVENOUS at 12:04

## 2018-04-30 RX ADMIN — SODIUM CHLORIDE 125 ML/HR: 0.9 INJECTION, SOLUTION INTRAVENOUS at 11:04

## 2018-04-30 NOTE — ED PROVIDER NOTES
Encounter Date: 4/30/2018    SCRIBE #1 NOTE: I, Dontae Hastings, am scribing for, and in the presence of, Dr. Mayes .       History     Chief Complaint   Patient presents with    Loss of Consciousness     Pt reports syncopal episode this am which she thinks she fell and hit her head on the floor. Pt c/o headache. Pt states she has had multiple syncopal episodes since she found out she is pregnant. Approx 16 weeks pregnant.      Time seen by provider: 12:27 PM    This is a 27 y.o. female who presents s/p LOC that occurred this morning. She woke up this morning, stood up, and started feeling dizzy. Patient states was walking to the front door at home when she experienced LOC. She fell and hit her head. Her boyfriend drove her to the ED. She also reports another episode of LOC yesterday. She is unsure how long she was unconscious for and woke up on the floor. She notes several episodes of LOC in the last two months. The patient is approximately sixteen weeks gestation and follows-up with OB-GYN at [facility] [place]. She is currently experiencing headaches, cough, intermittent chills, nausea, vomiting, right ear pain, and constipation. She has been unable to tolerate solid foods or liquids for the last six days. She has tried vitamin B6, Unisom, Zofran, and suppositories with no relief. The patient states she started smoking marijuana two weeks ago, and notes it is the only thing that relieves her nausea and vomiting. She denies prior use of marijuana before pregnancy. Patient also denies of tobacco or alcohol.       The history is provided by the patient.     Review of patient's allergies indicates:   Allergen Reactions    Codeine      History reviewed. No pertinent past medical history.  History reviewed. No pertinent surgical history.  History reviewed. No pertinent family history.  Social History   Substance Use Topics    Smoking status: Never Smoker    Smokeless tobacco: Not on file    Alcohol use No     Review of Systems    Constitutional: Positive for chills. Negative for fever.   HENT: Positive for ear pain (right). Negative for congestion, rhinorrhea and sore throat.    Respiratory: Positive for cough. Negative for shortness of breath.    Cardiovascular: Negative for chest pain.   Gastrointestinal: Positive for constipation, nausea and vomiting. Negative for abdominal pain and diarrhea.   Endocrine: Negative for polyuria.   Genitourinary: Negative for decreased urine volume and dysuria.   Musculoskeletal: Negative for back pain.   Skin: Negative for rash.   Allergic/Immunologic: Negative for immunocompromised state.   Neurological: Positive for dizziness, syncope and headaches. Negative for weakness.   Hematological: Does not bruise/bleed easily.   Psychiatric/Behavioral: Negative for confusion.       Physical Exam     Initial Vitals [04/30/18 1126]   BP Pulse Resp Temp SpO2   117/76 104 18 97.2 °F (36.2 °C) 98 %      MAP       89.67         Physical Exam    Nursing note and vitals reviewed.  Constitutional: She appears well-developed and well-nourished. She is not diaphoretic. No distress.   HENT:   Head: Normocephalic and atraumatic.   Right Ear: External ear normal.   Left Ear: External ear normal.   Mildly dry mucous membranes.    Eyes: Conjunctivae and EOM are normal. Right eye exhibits no discharge. Left eye exhibits no discharge.   No pallor or icterus.    Neck: Normal range of motion. Neck supple. No tracheal deviation present.   Cardiovascular: Normal rate, regular rhythm, normal heart sounds and intact distal pulses. Exam reveals no gallop and no friction rub.    No murmur heard.  Pulmonary/Chest: Breath sounds normal. No stridor. No respiratory distress. She has no wheezes. She has no rhonchi. She has no rales.   Abdominal: Soft. Bowel sounds are normal. She exhibits no distension. There is no tenderness. There is no rebound and no guarding.   Uterus not palpable on exam.    Musculoskeletal: Normal range of motion. She  exhibits no edema or tenderness.   Neurological: She is alert and oriented to person, place, and time. She has normal strength. No cranial nerve deficit.   Skin: Skin is warm and dry. Capillary refill takes less than 2 seconds. No erythema. No pallor.   Psychiatric: She has a normal mood and affect. Her behavior is normal.         ED Course   Procedures  Labs Reviewed   CBC W/ AUTO DIFFERENTIAL - Abnormal; Notable for the following:        Result Value    MCH 32.1 (*)     All other components within normal limits   URINALYSIS - Abnormal; Notable for the following:     Appearance, UA Hazy (*)     Protein, UA 1+ (*)     Ketones, UA 3+ (*)     Bilirubin (UA) 2+ (*)     Leukocytes, UA 1+ (*)     All other components within normal limits   BASIC METABOLIC PANEL - Abnormal; Notable for the following:     Sodium 135 (*)     CO2 17 (*)     All other components within normal limits   URINALYSIS MICROSCOPIC - Abnormal; Notable for the following:     WBC, UA 23 (*)     Bacteria, UA Few (*)     Trichomonas, UA Occasional (*)     All other components within normal limits   POCT URINE PREGNANCY - Abnormal; Notable for the following:     POC Preg Test, Ur Positive (*)     All other components within normal limits   DRUG SCREEN PANEL, URINE EMERGENCY   POCT GLUCOSE     EKG Readings: (Independently Interpreted)   Initial Reading: No STEMI. Conduction: Normal. Axis: Normal.   Normal sinus rhythm. Rate of 80.           Medical Decision Making:   Clinical Tests:   Lab Tests: Ordered and Reviewed  Medical Tests: Ordered and Reviewed  ED Management:  3:20 PM Feels better after liquids and Zofran. Wants to try liquids and crackers.    3:29 PM Discussed case with Dr. Moreno (OB-GYN). He reviewed labs and recommends discharge with close follow-up if she tolerates PO.                 Patient presents with a syncopal versus near syncopal episode which occurred earlier today.  She fell and struck her head but has no signs of trauma on  craniofacial exam.  She felt lightheaded prior to onset.  She has had recent problems with nausea and vomiting of pregnancy versus had difficulty tolerating both solids and liquids.  He appeared dehydrated upon arrival.  IV fluid repletion, antiemetics begun.  Laboratory studies show mild hyponatremia, otherwise unremarkable urinalysis with small amount of protein as well as ketones.  Although she has white blood cell, she also has 17 squames, only few bacteria, negative nitrite and no symptoms of UTI.  She has been better after the therapy.  She has tolerated clear liquids and crackers.  Case discussed with GYN service, no further recommendations at this time..  Continue follow-up with their service.             Clinical Impression:     1. Dehydration    2. Loss of consciousness    3. Hyperemesis gravidarum before end of 22 week gestation with dehydration                                 Facundo Mayes II, MD  05/01/18 2029

## 2018-04-30 NOTE — ED NOTES
Patient Identifiers for Daija Purvis checked and correct  LOC: The patient is awake, alert and aware of environment with an appropriate affect, the patient is oriented x 3 and speaking appropriate.  APPEARANCE: Patient resting comfortably and in no acute distress, patient is clean and well groomed, patient's clothing is properly fastened.  SKIN: The skin is warm and dry, patient has normal skin turgor and dry mucus membranes,no rashes or lesions.Skin Intact , No Breakdown Noted  Musculoskeletal :  Normal range of motion noted. Moves all extremeties well, No swelling or tenderness noted  RESPIRATORY: Airway is open and patent, respirations are spontaneous, patient has a normal effort and rate.Bilateral Lungs Sounds are clear  CARDIAC: Patient has a normal rate and rhythm, no periphreal edema noted, capillary refill < 3 seconds.   ABDOMEN: Soft and non tender to palpation, no distention noted. Bowels Sounds are +  PULSES: 2+  And symmetrical in all extremeties  NEUROLOGIC: PERRL, facial expression is symmetrical, patient moving all extremities, normal sensation in all extremities when touched with a finger.The patient is awake, alert and cooperative with a calm affect, patient is aware of environment.    Will continue to monitor

## 2018-05-03 ENCOUNTER — PATIENT MESSAGE (OUTPATIENT)
Dept: OBSTETRICS AND GYNECOLOGY | Facility: CLINIC | Age: 28
End: 2018-05-03

## 2018-05-04 ENCOUNTER — PATIENT MESSAGE (OUTPATIENT)
Dept: OBSTETRICS AND GYNECOLOGY | Facility: CLINIC | Age: 28
End: 2018-05-04

## 2018-05-07 ENCOUNTER — HOSPITAL ENCOUNTER (EMERGENCY)
Facility: OTHER | Age: 28
Discharge: HOME | End: 2018-05-07
Attending: EMERGENCY MEDICINE
Payer: MEDICAID

## 2018-05-07 VITALS
HEART RATE: 74 BPM | BODY MASS INDEX: 29.27 KG/M2 | TEMPERATURE: 99 F | RESPIRATION RATE: 14 BRPM | SYSTOLIC BLOOD PRESSURE: 111 MMHG | WEIGHT: 155 LBS | HEIGHT: 61 IN | OXYGEN SATURATION: 97 % | DIASTOLIC BLOOD PRESSURE: 75 MMHG

## 2018-05-07 DIAGNOSIS — O21.9 NAUSEA AND VOMITING IN PREGNANCY: Primary | ICD-10-CM

## 2018-05-07 DIAGNOSIS — R55 SYNCOPE: ICD-10-CM

## 2018-05-07 DIAGNOSIS — N30.01 ACUTE CYSTITIS WITH HEMATURIA: ICD-10-CM

## 2018-05-07 LAB
ALBUMIN SERPL BCP-MCNC: 4.1 G/DL
ALP SERPL-CCNC: 44 U/L
ALT SERPL W/O P-5'-P-CCNC: 40 U/L
ANION GAP SERPL CALC-SCNC: 14 MMOL/L
AST SERPL-CCNC: 30 U/L
BACTERIA #/AREA URNS HPF: ABNORMAL /HPF
BASOPHILS # BLD AUTO: 0.01 K/UL
BASOPHILS NFR BLD: 0.1 %
BILIRUB SERPL-MCNC: 0.6 MG/DL
BILIRUB UR QL STRIP: ABNORMAL
BUN SERPL-MCNC: 11 MG/DL
CALCIUM SERPL-MCNC: 10 MG/DL
CHLORIDE SERPL-SCNC: 105 MMOL/L
CLARITY UR: ABNORMAL
CO2 SERPL-SCNC: 17 MMOL/L
COLOR UR: YELLOW
CREAT SERPL-MCNC: 0.6 MG/DL
DIFFERENTIAL METHOD: ABNORMAL
EOSINOPHIL # BLD AUTO: 0 K/UL
EOSINOPHIL NFR BLD: 0.1 %
ERYTHROCYTE [DISTWIDTH] IN BLOOD BY AUTOMATED COUNT: 13.2 %
EST. GFR  (AFRICAN AMERICAN): >60 ML/MIN/1.73 M^2
EST. GFR  (NON AFRICAN AMERICAN): >60 ML/MIN/1.73 M^2
GLUCOSE SERPL-MCNC: 68 MG/DL
GLUCOSE UR QL STRIP: NEGATIVE
HCT VFR BLD AUTO: 36.2 %
HGB BLD-MCNC: 12.8 G/DL
HGB UR QL STRIP: ABNORMAL
HYALINE CASTS #/AREA URNS LPF: 0 /LPF
KETONES UR QL STRIP: ABNORMAL
LEUKOCYTE ESTERASE UR QL STRIP: ABNORMAL
LIPASE SERPL-CCNC: 26 U/L
LYMPHOCYTES # BLD AUTO: 1.8 K/UL
LYMPHOCYTES NFR BLD: 25 %
MCH RBC QN AUTO: 31.9 PG
MCHC RBC AUTO-ENTMCNC: 35.4 G/DL
MCV RBC AUTO: 90 FL
MICROSCOPIC COMMENT: ABNORMAL
MONOCYTES # BLD AUTO: 0.6 K/UL
MONOCYTES NFR BLD: 8.4 %
NEUTROPHILS # BLD AUTO: 4.6 K/UL
NEUTROPHILS NFR BLD: 66.3 %
NITRITE UR QL STRIP: NEGATIVE
PH UR STRIP: 6 [PH] (ref 5–8)
PLATELET # BLD AUTO: 224 K/UL
PMV BLD AUTO: 10.4 FL
POCT GLUCOSE: 69 MG/DL (ref 70–110)
POTASSIUM SERPL-SCNC: 3.3 MMOL/L
PROT SERPL-MCNC: 7.9 G/DL
PROT UR QL STRIP: ABNORMAL
RBC # BLD AUTO: 4.01 M/UL
RBC #/AREA URNS HPF: 8 /HPF (ref 0–4)
SODIUM SERPL-SCNC: 136 MMOL/L
SP GR UR STRIP: >=1.03 (ref 1–1.03)
SQUAMOUS #/AREA URNS HPF: 10 /HPF
URN SPEC COLLECT METH UR: ABNORMAL
UROBILINOGEN UR STRIP-ACNC: NEGATIVE EU/DL
WBC # BLD AUTO: 7 K/UL
WBC #/AREA URNS HPF: 50 /HPF (ref 0–5)
WBC CLUMPS URNS QL MICRO: ABNORMAL
YEAST URNS QL MICRO: ABNORMAL

## 2018-05-07 PROCEDURE — 85025 COMPLETE CBC W/AUTO DIFF WBC: CPT

## 2018-05-07 PROCEDURE — 63600175 PHARM REV CODE 636 W HCPCS: Performed by: PHYSICIAN ASSISTANT

## 2018-05-07 PROCEDURE — 99285 EMERGENCY DEPT VISIT HI MDM: CPT | Mod: 25

## 2018-05-07 PROCEDURE — 83690 ASSAY OF LIPASE: CPT

## 2018-05-07 PROCEDURE — 96365 THER/PROPH/DIAG IV INF INIT: CPT

## 2018-05-07 PROCEDURE — 81000 URINALYSIS NONAUTO W/SCOPE: CPT

## 2018-05-07 PROCEDURE — 80053 COMPREHEN METABOLIC PANEL: CPT

## 2018-05-07 PROCEDURE — 93010 ELECTROCARDIOGRAM REPORT: CPT | Mod: ,,, | Performed by: INTERNAL MEDICINE

## 2018-05-07 PROCEDURE — 25000003 PHARM REV CODE 250: Performed by: PHYSICIAN ASSISTANT

## 2018-05-07 PROCEDURE — 93005 ELECTROCARDIOGRAM TRACING: CPT

## 2018-05-07 PROCEDURE — 82962 GLUCOSE BLOOD TEST: CPT

## 2018-05-07 PROCEDURE — 96361 HYDRATE IV INFUSION ADD-ON: CPT

## 2018-05-07 RX ORDER — NITROFURANTOIN 25; 75 MG/1; MG/1
100 CAPSULE ORAL 2 TIMES DAILY
Qty: 10 CAPSULE | Refills: 0 | Status: SHIPPED | OUTPATIENT
Start: 2018-05-07 | End: 2018-05-12

## 2018-05-07 RX ADMIN — PROMETHAZINE HYDROCHLORIDE 25 MG: 25 INJECTION INTRAMUSCULAR; INTRAVENOUS at 10:05

## 2018-05-07 RX ADMIN — SODIUM CHLORIDE 1000 ML: 0.9 INJECTION, SOLUTION INTRAVENOUS at 10:05

## 2018-05-07 NOTE — ED NOTES
Patient resting in bed, blankets provided.     RR even and unlabored.     Skin is warm, dry, and appropriate.

## 2018-05-07 NOTE — ED PROVIDER NOTES
Encounter Date: 2018       History     Chief Complaint   Patient presents with    Abdominal Pain     abd pain, x2 weeks. States 17 weeks pregnant     Loss of Consciousness     1 episode today     Patient is a 27-year-old female  who is approximately 17 weeks pregnant who presents to the emergency department with nausea and vomiting.  Patient states she has been sick for the entirety of her pregnancy so far.  She states that she has multiple episodes of vomiting a day.  She states vomiting worsened last night.  She states she sees streaks of blood in her vomit.  She denies any fevers or chills.  She reports dry mouth.  She states she has not had normal bowel movements.  She reports decrease in appetite.  She states she still urinating normal amount.  She denies vaginal bleeding or vaginal discharge.  She states she's not able to take her prenatal vitamins because of the severe nausea.  She states today upon rising from her bed, she felt very weak.  She states she is unsure she passed out, but she woke up on the floor.  She denies any headache or injury to her body.  She denies any pain currently.      The history is provided by the patient.     Review of patient's allergies indicates:   Allergen Reactions    Codeine      No past medical history on file.  No past surgical history on file.  No family history on file.  Social History   Substance Use Topics    Smoking status: Never Smoker    Smokeless tobacco: Not on file    Alcohol use No     Review of Systems   Constitutional: Positive for activity change, appetite change and fatigue. Negative for chills and fever.   HENT: Negative for congestion, ear discharge, ear pain, nosebleeds, postnasal drip, rhinorrhea, sore throat and trouble swallowing.    Respiratory: Negative for cough and shortness of breath.    Cardiovascular: Negative for chest pain.   Gastrointestinal: Positive for nausea and vomiting. Negative for abdominal pain, blood in stool,  constipation and diarrhea.   Genitourinary: Negative for dysuria, flank pain, hematuria, pelvic pain and vaginal bleeding.   Musculoskeletal: Negative for back pain and neck pain.   Skin: Negative for rash and wound.   Neurological: Positive for weakness (generalized) and light-headedness. Negative for dizziness, speech difficulty, numbness and headaches.       Physical Exam     Initial Vitals [05/07/18 1023]   BP Pulse Resp Temp SpO2   125/89 90 16 98.5 °F (36.9 °C) 100 %      MAP       101         Physical Exam    Nursing note and vitals reviewed.  Constitutional: She appears well-developed and well-nourished. She is not diaphoretic.  Non-toxic appearance. No distress.   HENT:   Head: Normocephalic.   Right Ear: Hearing and external ear normal.   Left Ear: Hearing and external ear normal.   Nose: Nose normal.   Mouth/Throat: Uvula is midline, oropharynx is clear and moist and mucous membranes are normal. No oropharyngeal exudate.   Eyes: Conjunctivae and EOM are normal. Pupils are equal, round, and reactive to light.   Neck: Normal range of motion and full passive range of motion without pain. Neck supple. Normal range of motion present. No neck rigidity.   Cardiovascular: Normal rate and regular rhythm.   Pulmonary/Chest: Breath sounds normal.   Abdominal: Soft. Bowel sounds are normal. There is no tenderness. There is no rebound, no CVA tenderness, no tenderness at McBurney's point and negative Mcpherson's sign.   Lymphadenopathy:     She has no cervical adenopathy.   Neurological: She is alert and oriented to person, place, and time. She has normal strength. No cranial nerve deficit or sensory deficit. She displays a negative Romberg sign. Coordination and gait normal.   Skin: Skin is warm and dry. Capillary refill takes less than 2 seconds.   Psychiatric: She has a normal mood and affect.         ED Course   Procedures  Labs Reviewed   CBC W/ AUTO DIFFERENTIAL - Abnormal; Notable for the following:        Result  Value    Hematocrit 36.2 (*)     MCH 31.9 (*)     All other components within normal limits   COMPREHENSIVE METABOLIC PANEL - Abnormal; Notable for the following:     Potassium 3.3 (*)     CO2 17 (*)     Glucose 68 (*)     Alkaline Phosphatase 44 (*)     All other components within normal limits   URINALYSIS - Abnormal; Notable for the following:     Appearance, UA Hazy (*)     Specific Gravity, UA >=1.030 (*)     Protein, UA 1+ (*)     Ketones, UA 3+ (*)     Bilirubin (UA) 1+ (*)     Occult Blood UA Trace (*)     Leukocytes, UA 2+ (*)     All other components within normal limits   URINALYSIS MICROSCOPIC - Abnormal; Notable for the following:     RBC, UA 8 (*)     WBC, UA 50 (*)     Bacteria, UA Moderate (*)     Yeast, UA Rare (*)     All other components within normal limits   LIPASE   POCT GLUCOSE MONITORING CONTINUOUS     EKG Readings: (Independently Interpreted)   Rhythm: Normal Sinus Rhythm.   Unchanged from 2018          Medical Decision Making:   Initial Assessment:   Urgent evaluation of a 27-year-old female  is presently 17 weeks pregnant who presents to the emergency department after syncopal episode.  Patient is afebrile, nontoxic appearing, and hemodynamically stable.  Patient is well-appearing on exam.  Moist mucous membranes.  Benign abdominal exam.  Normal neuro exam.  EKG is obtained and shows no dangerous dysrhythmia.  Patient's glucose is within normal limits.  Patient is slightly orthostatic with an increase of heart rate when she stands.  I suspect dehydration secondary to the nausea and vomiting and decreased intake.  Fetal heart tones are obtained and within normal limits.  Lab work will be obtained to rule out significant kidney insufficiency or electrolytic disturbance.  Patient be given fluids and antiemetic.  Clinical Tests:   Lab Tests: Ordered and Reviewed  ED Management:  Labs reveal no kidney insufficiency or electrolyte disturbance.  Urinalysis does reveal signs of  infection.  She'll be treated with Macrobid.  Patient is no longer vomiting.  On repeat evaluation, she states she is feeling much better.  She is by mouth challenge with no episodes of vomiting.  She will be discharged with instructions to follow-up with OB/GYN or return to the emergency department with any worsening symptoms or concerns.  Other:   I have discussed this case with another health care provider.       <> Summary of the Discussion: Dr. Nash                      Clinical Impression:   The primary encounter diagnosis was Nausea and vomiting in pregnancy. Diagnoses of Syncope and Acute cystitis with hematuria were also pertinent to this visit.                           Mary Grace Santiago PA-C  05/07/18 1425

## 2018-05-15 ENCOUNTER — TELEPHONE (OUTPATIENT)
Dept: MATERNAL FETAL MEDICINE | Facility: CLINIC | Age: 28
End: 2018-05-15

## 2018-05-17 ENCOUNTER — HOSPITAL ENCOUNTER (INPATIENT)
Facility: OTHER | Age: 28
LOS: 4 days | Discharge: HOME | End: 2018-05-21
Attending: EMERGENCY MEDICINE | Admitting: OBSTETRICS & GYNECOLOGY
Payer: MEDICAID

## 2018-05-17 DIAGNOSIS — R07.9 CHEST PAIN: ICD-10-CM

## 2018-05-17 DIAGNOSIS — E87.8 ELECTROLYTE ABNORMALITY: ICD-10-CM

## 2018-05-17 DIAGNOSIS — O21.0 HYPEREMESIS GRAVIDARUM: ICD-10-CM

## 2018-05-17 DIAGNOSIS — E43 SEVERE MALNUTRITION: ICD-10-CM

## 2018-05-17 DIAGNOSIS — R74.8 ELEVATED LIVER ENZYMES: ICD-10-CM

## 2018-05-17 DIAGNOSIS — R00.0 TACHYCARDIA: ICD-10-CM

## 2018-05-17 DIAGNOSIS — E87.1 HYPONATREMIA: ICD-10-CM

## 2018-05-17 DIAGNOSIS — I21.3 STEMI (ST ELEVATION MYOCARDIAL INFARCTION): ICD-10-CM

## 2018-05-17 DIAGNOSIS — O21.1 HYPEREMESIS GRAVIDARUM WITH DEHYDRATION: Primary | ICD-10-CM

## 2018-05-17 DIAGNOSIS — E55.9 VITAMIN D DEFICIENCY, UNSPECIFIED: ICD-10-CM

## 2018-05-17 DIAGNOSIS — R55 SYNCOPE: ICD-10-CM

## 2018-05-17 DIAGNOSIS — R79.89 ELEVATED TROPONIN: ICD-10-CM

## 2018-05-17 DIAGNOSIS — I21.9 MYOCARDIAL INFARCTION: ICD-10-CM

## 2018-05-17 DIAGNOSIS — E87.6 HYPOKALEMIA: ICD-10-CM

## 2018-05-17 LAB
ALBUMIN SERPL BCP-MCNC: 4.7 G/DL
ALP SERPL-CCNC: 61 U/L
ALT SERPL W/O P-5'-P-CCNC: 162 U/L
AMPHET+METHAMPHET UR QL: NEGATIVE
ANION GAP SERPL CALC-SCNC: 23 MMOL/L
AST SERPL-CCNC: 86 U/L
BACTERIA #/AREA URNS HPF: ABNORMAL /HPF
BARBITURATES UR QL SCN>200 NG/ML: NEGATIVE
BASOPHILS # BLD AUTO: 0 K/UL
BASOPHILS NFR BLD: 0 %
BENZODIAZ UR QL SCN>200 NG/ML: NEGATIVE
BILIRUB SERPL-MCNC: 0.8 MG/DL
BILIRUB UR QL STRIP: ABNORMAL
BUN SERPL-MCNC: 13 MG/DL
BZE UR QL SCN: NEGATIVE
CALCIUM SERPL-MCNC: 11.4 MG/DL
CANNABINOIDS UR QL SCN: NORMAL
CHLORIDE SERPL-SCNC: 99 MMOL/L
CK SERPL-CCNC: 29 U/L
CLARITY UR: CLEAR
CO2 SERPL-SCNC: 10 MMOL/L
COLOR UR: YELLOW
CREAT SERPL-MCNC: 0.9 MG/DL
CREAT UR-MCNC: 88.6 MG/DL
DIFFERENTIAL METHOD: ABNORMAL
EOSINOPHIL # BLD AUTO: 0 K/UL
EOSINOPHIL NFR BLD: 0 %
ERYTHROCYTE [DISTWIDTH] IN BLOOD BY AUTOMATED COUNT: 13.3 %
EST. GFR  (AFRICAN AMERICAN): >60 ML/MIN/1.73 M^2
EST. GFR  (NON AFRICAN AMERICAN): >60 ML/MIN/1.73 M^2
ETHANOL UR-MCNC: <10 MG/DL
GIANT PLATELETS BLD QL SMEAR: PRESENT
GLUCOSE SERPL-MCNC: 93 MG/DL
GLUCOSE UR QL STRIP: NEGATIVE
GRAN CASTS #/AREA URNS LPF: 2 /LPF
HCT VFR BLD AUTO: 42.2 %
HGB BLD-MCNC: 15.6 G/DL
HGB UR QL STRIP: ABNORMAL
HYALINE CASTS #/AREA URNS LPF: 25 /LPF
INR PPP: 1
KETONES UR QL STRIP: ABNORMAL
LEUKOCYTE ESTERASE UR QL STRIP: ABNORMAL
LYMPHOCYTES # BLD AUTO: 1.6 K/UL
LYMPHOCYTES NFR BLD: 18.1 %
MCH RBC QN AUTO: 31.4 PG
MCHC RBC AUTO-ENTMCNC: 37 G/DL
MCV RBC AUTO: 85 FL
METHADONE UR QL SCN>300 NG/ML: NEGATIVE
MICROSCOPIC COMMENT: ABNORMAL
MONOCYTES # BLD AUTO: 0.8 K/UL
MONOCYTES NFR BLD: 8.7 %
NEUTROPHILS # BLD AUTO: 6.5 K/UL
NEUTROPHILS NFR BLD: 72.9 %
NITRITE UR QL STRIP: NEGATIVE
OPIATES UR QL SCN: NEGATIVE
PCP UR QL SCN>25 NG/ML: NEGATIVE
PH UR STRIP: 6 [PH] (ref 5–8)
PLATELET # BLD AUTO: 281 K/UL
PLATELET BLD QL SMEAR: ABNORMAL
PMV BLD AUTO: 12.5 FL
POTASSIUM SERPL-SCNC: 3.1 MMOL/L
PROT SERPL-MCNC: 9.4 G/DL
PROT UR QL STRIP: ABNORMAL
PROTHROMBIN TIME: 10.8 SEC
RBC # BLD AUTO: 4.97 M/UL
RBC #/AREA URNS HPF: 3 /HPF (ref 0–4)
SODIUM SERPL-SCNC: 132 MMOL/L
SP GR UR STRIP: >=1.03 (ref 1–1.03)
TOXICOLOGY INFORMATION: NORMAL
TRICHOMONAS UR QL MICRO: ABNORMAL
TROPONIN I SERPL DL<=0.01 NG/ML-MCNC: 0.03 NG/ML
TROPONIN I SERPL DL<=0.01 NG/ML-MCNC: 0.06 NG/ML
URN SPEC COLLECT METH UR: ABNORMAL
UROBILINOGEN UR STRIP-ACNC: NEGATIVE EU/DL
WBC # BLD AUTO: 8.94 K/UL
WBC #/AREA URNS HPF: 30 /HPF (ref 0–5)

## 2018-05-17 PROCEDURE — 85025 COMPLETE CBC W/AUTO DIFF WBC: CPT

## 2018-05-17 PROCEDURE — 94761 N-INVAS EAR/PLS OXIMETRY MLT: CPT

## 2018-05-17 PROCEDURE — 25000003 PHARM REV CODE 250: Performed by: STUDENT IN AN ORGANIZED HEALTH CARE EDUCATION/TRAINING PROGRAM

## 2018-05-17 PROCEDURE — 20000000 HC ICU ROOM

## 2018-05-17 PROCEDURE — 84484 ASSAY OF TROPONIN QUANT: CPT | Mod: 91

## 2018-05-17 PROCEDURE — 25000003 PHARM REV CODE 250

## 2018-05-17 PROCEDURE — 93005 ELECTROCARDIOGRAM TRACING: CPT

## 2018-05-17 PROCEDURE — 93010 ELECTROCARDIOGRAM REPORT: CPT | Mod: ,,, | Performed by: INTERNAL MEDICINE

## 2018-05-17 PROCEDURE — C9113 INJ PANTOPRAZOLE SODIUM, VIA: HCPCS | Performed by: STUDENT IN AN ORGANIZED HEALTH CARE EDUCATION/TRAINING PROGRAM

## 2018-05-17 PROCEDURE — 87086 URINE CULTURE/COLONY COUNT: CPT

## 2018-05-17 PROCEDURE — 80307 DRUG TEST PRSMV CHEM ANLYZR: CPT

## 2018-05-17 PROCEDURE — 82550 ASSAY OF CK (CPK): CPT

## 2018-05-17 PROCEDURE — 99223 1ST HOSP IP/OBS HIGH 75: CPT | Mod: ,,, | Performed by: OBSTETRICS & GYNECOLOGY

## 2018-05-17 PROCEDURE — 80053 COMPREHEN METABOLIC PANEL: CPT | Mod: 91

## 2018-05-17 PROCEDURE — 96361 HYDRATE IV INFUSION ADD-ON: CPT

## 2018-05-17 PROCEDURE — 36415 COLL VENOUS BLD VENIPUNCTURE: CPT

## 2018-05-17 PROCEDURE — 63600175 PHARM REV CODE 636 W HCPCS: Performed by: STUDENT IN AN ORGANIZED HEALTH CARE EDUCATION/TRAINING PROGRAM

## 2018-05-17 PROCEDURE — 96374 THER/PROPH/DIAG INJ IV PUSH: CPT

## 2018-05-17 PROCEDURE — 99285 EMERGENCY DEPT VISIT HI MDM: CPT | Mod: 25

## 2018-05-17 PROCEDURE — 63600175 PHARM REV CODE 636 W HCPCS: Performed by: EMERGENCY MEDICINE

## 2018-05-17 PROCEDURE — 80053 COMPREHEN METABOLIC PANEL: CPT

## 2018-05-17 PROCEDURE — 82962 GLUCOSE BLOOD TEST: CPT

## 2018-05-17 PROCEDURE — 25000003 PHARM REV CODE 250: Performed by: EMERGENCY MEDICINE

## 2018-05-17 PROCEDURE — 81000 URINALYSIS NONAUTO W/SCOPE: CPT

## 2018-05-17 PROCEDURE — 63600175 PHARM REV CODE 636 W HCPCS: Performed by: ANESTHESIOLOGY

## 2018-05-17 PROCEDURE — 85610 PROTHROMBIN TIME: CPT

## 2018-05-17 RX ORDER — PRENATAL WITH FERROUS FUM AND FOLIC ACID 3080; 920; 120; 400; 22; 1.84; 3; 20; 10; 1; 12; 200; 27; 25; 2 [IU]/1; [IU]/1; MG/1; [IU]/1; MG/1; MG/1; MG/1; MG/1; MG/1; MG/1; UG/1; MG/1; MG/1; MG/1; MG/1
1 TABLET ORAL DAILY
Status: DISCONTINUED | OUTPATIENT
Start: 2018-05-17 | End: 2018-05-21 | Stop reason: HOSPADM

## 2018-05-17 RX ORDER — DIPHENHYDRAMINE HYDROCHLORIDE 50 MG/ML
25 INJECTION INTRAMUSCULAR; INTRAVENOUS EVERY 4 HOURS PRN
Status: DISCONTINUED | OUTPATIENT
Start: 2018-05-17 | End: 2018-05-21 | Stop reason: HOSPADM

## 2018-05-17 RX ORDER — DIPHENHYDRAMINE HCL 25 MG
25 CAPSULE ORAL EVERY 4 HOURS PRN
Status: DISCONTINUED | OUTPATIENT
Start: 2018-05-17 | End: 2018-05-21 | Stop reason: HOSPADM

## 2018-05-17 RX ORDER — LANOLIN ALCOHOL/MO/W.PET/CERES
100 CREAM (GRAM) TOPICAL DAILY
Status: DISCONTINUED | OUTPATIENT
Start: 2018-05-17 | End: 2018-05-21 | Stop reason: HOSPADM

## 2018-05-17 RX ORDER — SODIUM CHLORIDE 9 MG/ML
INJECTION, SOLUTION INTRAVENOUS CONTINUOUS
Status: DISCONTINUED | OUTPATIENT
Start: 2018-05-17 | End: 2018-05-18

## 2018-05-17 RX ORDER — SIMETHICONE 80 MG
1 TABLET,CHEWABLE ORAL EVERY 6 HOURS PRN
Status: DISCONTINUED | OUTPATIENT
Start: 2018-05-17 | End: 2018-05-21 | Stop reason: HOSPADM

## 2018-05-17 RX ORDER — ONDANSETRON 2 MG/ML
4 INJECTION INTRAMUSCULAR; INTRAVENOUS EVERY 6 HOURS
Status: DISCONTINUED | OUTPATIENT
Start: 2018-05-17 | End: 2018-05-18

## 2018-05-17 RX ORDER — ASPIRIN 325 MG
325 TABLET ORAL ONCE
Status: COMPLETED | OUTPATIENT
Start: 2018-05-17 | End: 2018-05-17

## 2018-05-17 RX ORDER — ASPIRIN 325 MG
TABLET ORAL
Status: COMPLETED
Start: 2018-05-17 | End: 2018-05-17

## 2018-05-17 RX ORDER — ONDANSETRON 2 MG/ML
4 INJECTION INTRAMUSCULAR; INTRAVENOUS
Status: COMPLETED | OUTPATIENT
Start: 2018-05-17 | End: 2018-05-17

## 2018-05-17 RX ORDER — AMOXICILLIN 250 MG
1 CAPSULE ORAL NIGHTLY PRN
Status: DISCONTINUED | OUTPATIENT
Start: 2018-05-17 | End: 2018-05-21 | Stop reason: HOSPADM

## 2018-05-17 RX ORDER — POTASSIUM CHLORIDE 7.45 MG/ML
10 INJECTION INTRAVENOUS
Status: COMPLETED | OUTPATIENT
Start: 2018-05-17 | End: 2018-05-17

## 2018-05-17 RX ORDER — NITROGLYCERIN 0.4 MG/1
0.4 TABLET SUBLINGUAL EVERY 5 MIN PRN
Status: DISCONTINUED | OUTPATIENT
Start: 2018-05-17 | End: 2018-05-21 | Stop reason: HOSPADM

## 2018-05-17 RX ORDER — SUCRALFATE 1 G/1
1 TABLET ORAL
Status: DISCONTINUED | OUTPATIENT
Start: 2018-05-17 | End: 2018-05-21 | Stop reason: HOSPADM

## 2018-05-17 RX ORDER — PROMETHAZINE HYDROCHLORIDE 25 MG/1
25 SUPPOSITORY RECTAL
Status: COMPLETED | OUTPATIENT
Start: 2018-05-17 | End: 2018-05-17

## 2018-05-17 RX ORDER — ACETAMINOPHEN 325 MG/1
650 TABLET ORAL EVERY 6 HOURS PRN
Status: DISCONTINUED | OUTPATIENT
Start: 2018-05-17 | End: 2018-05-21 | Stop reason: HOSPADM

## 2018-05-17 RX ADMIN — SODIUM CHLORIDE 1000 ML: 0.9 INJECTION, SOLUTION INTRAVENOUS at 09:05

## 2018-05-17 RX ADMIN — SUCRALFATE 1 G: 1 TABLET ORAL at 06:05

## 2018-05-17 RX ADMIN — SODIUM CHLORIDE: 0.9 INJECTION, SOLUTION INTRAVENOUS at 09:05

## 2018-05-17 RX ADMIN — POTASSIUM CHLORIDE 10 MEQ: 7.46 INJECTION, SOLUTION INTRAVENOUS at 09:05

## 2018-05-17 RX ADMIN — POTASSIUM CHLORIDE 10 MEQ: 7.46 INJECTION, SOLUTION INTRAVENOUS at 07:05

## 2018-05-17 RX ADMIN — ASPIRIN 325 MG ORAL TABLET 325 MG: 325 PILL ORAL at 08:05

## 2018-05-17 RX ADMIN — POTASSIUM CHLORIDE 10 MEQ: 7.46 INJECTION, SOLUTION INTRAVENOUS at 05:05

## 2018-05-17 RX ADMIN — SUCRALFATE 1 G: 1 TABLET ORAL at 09:05

## 2018-05-17 RX ADMIN — ONDANSETRON HYDROCHLORIDE 4 MG: 2 INJECTION, SOLUTION INTRAMUSCULAR; INTRAVENOUS at 05:05

## 2018-05-17 RX ADMIN — Medication 325 MG: at 08:05

## 2018-05-17 RX ADMIN — NITROGLYCERIN 0.4 MG: 0.4 TABLET SUBLINGUAL at 08:05

## 2018-05-17 RX ADMIN — ONDANSETRON HYDROCHLORIDE 4 MG: 2 INJECTION, SOLUTION INTRAMUSCULAR; INTRAVENOUS at 10:05

## 2018-05-17 RX ADMIN — POTASSIUM CHLORIDE 10 MEQ: 7.46 INJECTION, SOLUTION INTRAVENOUS at 04:05

## 2018-05-17 RX ADMIN — PANTOPRAZOLE SODIUM 40 MG: 40 INJECTION, POWDER, FOR SOLUTION INTRAVENOUS at 03:05

## 2018-05-17 RX ADMIN — PROMETHAZINE HYDROCHLORIDE 25 MG: 25 SUPPOSITORY RECTAL at 12:05

## 2018-05-17 RX ADMIN — FOLIC ACID: 5 INJECTION, SOLUTION INTRAMUSCULAR; INTRAVENOUS; SUBCUTANEOUS at 03:05

## 2018-05-17 NOTE — ED PROVIDER NOTES
"Encounter Date: 2018    SCRIBE #1 NOTE: I, Mihai Najera, am scribing for, and in the presence of, Dr. Denton.       History     Chief Complaint   Patient presents with    Loss of Consciousness     Pt called over head in [facility] w/ Rapid response, pt found on floor unresponsive by hospital staff, awakened easily with eyes open to verbal stimuli. aaox4 states," I just passed out I think this has been happening". Reports + 17 wks pregnant     Seen by provider: 9:50 AM    Patient is a 27 y.o. female,  approximately 17 weeks gestational age, who presents to the ED as a rapid response s/p syncopal episode in the hospital [facility]. Patient states she became weak prior to losing consciousness and falling forward onto the floor. She denies head trauma. Per friend, she was easily awakened and was not unconscious for move than a couple of seconds. Patient states she was en route to the ED for nausea and vomiting during pregnancy when the syncopal episode occurred. She states she has been vomiting "for the entire pregnancy." She reports vomiting many times a day, and states she has been seen here multiple times for this and has been admitted once. She states she is trying to take her prenatal vitamins, but is having trouble tolerating PO intake secondary to N/V. She report no alleviating factors. She denies vaginal bleeding, vaginal discharge, burning on urination, frequency, or urgency. She denies use of tobacco, alcohol, or illicit drugs.       The history is provided by the patient.     Review of patient's allergies indicates:   Allergen Reactions    Codeine      History reviewed. No pertinent past medical history.  History reviewed. No pertinent surgical history.  History reviewed. No pertinent family history.  Social History   Substance Use Topics    Smoking status: Never Smoker    Smokeless tobacco: Not on file    Alcohol use No     Review of Systems   Constitutional: Negative for fever.   HENT: " Negative for congestion.    Respiratory: Negative for shortness of breath.    Cardiovascular: Negative for chest pain.   Gastrointestinal: Positive for nausea and vomiting.   Genitourinary: Negative for dysuria, frequency, urgency, vaginal bleeding and vaginal discharge.   Musculoskeletal: Negative for back pain and neck pain.   Skin: Negative for rash and wound.   Neurological: Positive for syncope and weakness. Negative for headaches.   Psychiatric/Behavioral: Negative for confusion.       Physical Exam     Initial Vitals   BP Pulse Resp Temp SpO2   05/17/18 0942 05/17/18 0942 05/17/18 1537 05/17/18 0938 05/17/18 0942   (!) 122/58 (!) 118 16 97.3 °F (36.3 °C) 97 %      MAP       05/17/18 0942       79.33         Physical Exam    Nursing note and vitals reviewed.  Constitutional: She appears well-developed and well-nourished. She is not diaphoretic. No distress.   HENT:   Head: Normocephalic and atraumatic.   Dry lips. Sticky mucous membranes.   Eyes: Conjunctivae and EOM are normal.   Neck: Normal range of motion. Neck supple.   Cardiovascular: Regular rhythm and normal heart sounds. Tachycardia present.  Exam reveals no gallop and no friction rub.    No murmur heard.  Pulmonary/Chest: Breath sounds normal. No respiratory distress. She has no wheezes. She has no rhonchi. She has no rales.   Abdominal: Soft. There is no tenderness.   Musculoskeletal: Normal range of motion.   Neurological: She is alert and oriented to person, place, and time.   Skin: Skin is dry.   Skin is cool to touch.   Psychiatric: She has a normal mood and affect. Her behavior is normal.         ED Course   Procedures  Labs Reviewed   CBC W/ AUTO DIFFERENTIAL - Abnormal; Notable for the following:        Result Value    MCH 31.4 (*)     MCHC 37.0 (*)     All other components within normal limits   COMPREHENSIVE METABOLIC PANEL - Abnormal; Notable for the following:     Sodium 132 (*)     Potassium 3.1 (*)     CO2 10 (*)     Calcium 11.4 (*)      Total Protein 9.4 (*)     AST 86 (*)      (*)     Anion Gap 23 (*)     All other components within normal limits   URINALYSIS - Abnormal; Notable for the following:     Specific Gravity, UA >=1.030 (*)     Protein, UA 2+ (*)     Ketones, UA 3+ (*)     Bilirubin (UA) 1+ (*)     Occult Blood UA Trace (*)     Leukocytes, UA 1+ (*)     All other components within normal limits   TROPONIN I - Abnormal; Notable for the following:     Troponin I 0.030 (*)     All other components within normal limits   URINALYSIS MICROSCOPIC - Abnormal; Notable for the following:     WBC, UA 30 (*)     Bacteria, UA Moderate (*)     Hyaline Casts, UA 25 (*)     Granular Casts, UA 2 (*)     Trichomonas, UA Few (*)     All other components within normal limits   PROTIME-INR   CK     EKG Readings: (Independently Interpreted)   Sinus tachycardia. Rate of 105. T wave inversions in leads II, III, AVF, V4, V5, V6, V3. No STEMI.          Medical Decision Making:   Independently Interpreted Test(s):   I have ordered and independently interpreted EKG Reading(s) - see prior notes  Clinical Tests:   Lab Tests: Reviewed and Ordered  Medical Tests: Ordered and Reviewed  ED Management:  12:30 PM - Discussed the patient with Dr. Moreno. She will come to see the patient and most likely admit her.            Scribe Attestation:   Scribe #1: I performed the above scribed service and the documentation accurately describes the services I performed. I attest to the accuracy of the note.    Attending Attestation:           Physician Attestation for Scribe:  Physician Attestation Statement for Scribe #1: I, Dr. Denton, reviewed documentation, as scribed by Mihai Najera in my presence, and it is both accurate and complete.         Attending ED Notes:   Emergent evaluation of 27-year-old female, G1, P0, proximal was 17 weeks gestation who presents to the emergency department with intractable nausea and vomiting with pregnancy.  Patient is afebrile and  tachycardic.  Patient is no elevation of white blood cell count.  H&H is 15.6 42.2.  Sodium 132.  Potassium is 3.1.  Calcium is 11.4.  AST and ALT are 86 and 162.  Anion gap of 23.  EKG reveals sinus tachycardia.  Urinary analysis reveals protein, ketones, bilirubin, blood, bacteria and few trichomonas.  Patient's first troponin is 0.030.  The patient is extensively counseled on her diagnosis and treatment including all diagnostic, laboratory and physical exam findings.  The patient is admitted in stable condition.             Clinical Impression:     1. Hyperemesis gravidarum with dehydration    2. Tachycardia    3. Hyponatremia    4. Hypokalemia    5. Elevated liver enzymes    6. Elevated troponin    7. Syncope    8. Hyperemesis gravidarum    9. Chest pain    10. STEMI (ST elevation myocardial infarction)    11. Myocardial infarction    12. Severe malnutrition    13. Vitamin D deficiency, unspecified    14. Electrolyte abnormality                              Juan Becker MD  05/25/18 0031

## 2018-05-17 NOTE — ASSESSMENT & PLAN NOTE
- Admit for fluid resuscitation and antiemetics  - Banana bag  - Stagger IV phenergan and zofran q6 hours. Give IV until patient can tolerate PO medications  - Blood-streaked emesis likely caused by esophageal irritation. Will give IV protonix and PO sucralfate  - Daily CMP. Low potassium on admit. Will replace IV until patient tolerates PO.   - NS @125  - Clear liquid diet as tolerated  - Check FHT daily

## 2018-05-17 NOTE — SUBJECTIVE & OBJECTIVE
Obstetric History       T0      L0     SAB0   TAB0   Ectopic0   Multiple0   Live Births0       # Outcome Date GA Lbr Lan/2nd Weight Sex Delivery Anes PTL Lv   1 Current                 History reviewed. No pertinent past medical history.  History reviewed. No pertinent surgical history.      (Not in a hospital admission)    Review of patient's allergies indicates:   Allergen Reactions    Codeine         Family History     None        Social History Main Topics    Smoking status: Never Smoker    Smokeless tobacco: Not on file    Alcohol use No    Drug use: No    Sexual activity: Yes     Review of Systems   Constitutional: Negative.  Negative for chills and fever.   Eyes: Negative.    Respiratory: Negative for shortness of breath.    Cardiovascular: Negative for chest pain.   Gastrointestinal: Positive for abdominal pain, constipation, nausea and vomiting. Negative for bloating.   Endocrine: Negative.    Genitourinary: Negative for menstrual problem, pelvic pain and vaginal bleeding.   Musculoskeletal: Negative for back pain.   Skin:  Negative.   Neurological: Positive for syncope.   Hematological: Negative.    Psychiatric/Behavioral: Negative.       Objective:     Vital Signs (Most Recent):  Temp: 97.3 °F (36.3 °C) (18 0938)  Pulse: 98 (18 1122)  BP: 115/76 (18 1122)  SpO2: 100 % (18 1122) Vital Signs (24h Range):  Temp:  [97.3 °F (36.3 °C)] 97.3 °F (36.3 °C)  Pulse:  [] 98  SpO2:  [97 %-100 %] 100 %  BP: (115-126)/(58-92) 115/76     Weight: 68 kg (150 lb)  Body mass index is 28.34 kg/m².    FHT: verified in ED    Physical Exam:   Constitutional: She is oriented to person, place, and time. She appears well-developed. No distress.    HENT:   Head: Atraumatic.   Mouth/Throat: Mucous membranes are dry.    Eyes: EOM are normal. Pupils are equal, round, and reactive to light.    Neck: Normal range of motion. Neck supple.    Cardiovascular: Normal rate and regular rhythm.      Pulmonary/Chest: Effort normal. No respiratory distress.        Abdominal: Soft. She exhibits no distension and no abdominal incision. There is tenderness (diffuse, non-acute).             Musculoskeletal: Normal range of motion. She exhibits no edema or tenderness.       Neurological: She is alert and oriented to person, place, and time.    Skin: Skin is warm and dry. She is not diaphoretic.        Significant Labs:  Lab Results   Component Value Date    GROUPTRH A POS 04/06/2018    HEPBSAG Negative 04/06/2018       I have personallly reviewed all pertinent lab results from the last 24 hours.

## 2018-05-17 NOTE — H&P
[facility]  Obstetrics  History & Physical    Patient Name: Daija Purvis  MRN: [id number]  Admission Date: 2018  Primary Care Provider: Lizet Abreu MD    Subjective:     Principal Problem:Hyperemesis gravidarum    History of Present Illness:  Daija Purvis is a 27 y.o. F at 17w4d who presents complaining of persistent nausea and vomiting with repetitive syncopal episodes. She lost consciousness in the hospital lobby when she presented today. She was coming to the hospital because of her vomiting. She has been unable to keep solids down for the past two weeks, but liquids have been staying down until the past two days. She has been taking Diclegis and phenergan at home. She reports streaks of blood in her vomit for 2 weeks, but complains of increased amounts of blood today. Patient reports having regular syncopal episodes during this pregnancy related to weakness. She has at least one episode of feeling faint per day, but does not always lose consciousness.     She also complains of abdominal pain after falling. Reports she fell directly onto her abdomen from standing. The pain is sharp, cramping, and diffuse. She denies vaginal bleeding.       Obstetric History       T0      L0     SAB0   TAB0   Ectopic0   Multiple0   Live Births0       # Outcome Date GA Lbr Lan/2nd Weight Sex Delivery Anes PTL Lv   1 Current                 History reviewed. No pertinent past medical history.  History reviewed. No pertinent surgical history.      (Not in a hospital admission)    Review of patient's allergies indicates:   Allergen Reactions    Codeine         Family History     None        Social History Main Topics    Smoking status: Never Smoker    Smokeless tobacco: Not on file    Alcohol use No    Drug use: No    Sexual activity: Yes     Review of Systems   Constitutional: Negative.  Negative for chills and fever.   Eyes: Negative.    Respiratory: Negative for shortness  of breath.    Cardiovascular: Negative for chest pain.   Gastrointestinal: Positive for abdominal pain, constipation, nausea and vomiting. Negative for bloating.   Endocrine: Negative.    Genitourinary: Negative for menstrual problem, pelvic pain and vaginal bleeding.   Musculoskeletal: Negative for back pain.   Skin:  Negative.   Neurological: Positive for syncope.   Hematological: Negative.    Psychiatric/Behavioral: Negative.       Objective:     Vital Signs (Most Recent):  Temp: 97.3 °F (36.3 °C) (18 0938)  Pulse: 98 (18 1122)  BP: 115/76 (18 1122)  SpO2: 100 % (18 1122) Vital Signs (24h Range):  Temp:  [97.3 °F (36.3 °C)] 97.3 °F (36.3 °C)  Pulse:  [] 98  SpO2:  [97 %-100 %] 100 %  BP: (115-126)/(58-92) 115/76     Weight: 68 kg (150 lb)  Body mass index is 28.34 kg/m².    FHT: verified in ED    Physical Exam:   Constitutional: She is oriented to person, place, and time. She appears well-developed. No distress.    HENT:   Head: Atraumatic.   Mouth/Throat: Mucous membranes are dry.    Eyes: EOM are normal. Pupils are equal, round, and reactive to light.    Neck: Normal range of motion. Neck supple.    Cardiovascular: Normal rate and regular rhythm.     Pulmonary/Chest: Effort normal. No respiratory distress.        Abdominal: Soft. She exhibits no distension and no abdominal incision. There is tenderness (diffuse, non-acute).             Musculoskeletal: Normal range of motion. She exhibits no edema or tenderness.       Neurological: She is alert and oriented to person, place, and time.    Skin: Skin is warm and dry. She is not diaphoretic.        Significant Labs:  Lab Results   Component Value Date    GROUPTRH A POS 2018    HEPBSAG Negative 2018       I have personallly reviewed all pertinent lab results from the last 24 hours.    Assessment/Plan:     27 y.o. female  at 17w4d for:    * Hyperemesis gravidarum    - Admit for fluid resuscitation and antiemetics  -  Banana bag  - Stagger IV phenergan and zofran q6 hours. Give IV until patient can tolerate PO medications  - Blood-streaked emesis likely caused by esophageal irritation. Will give IV protonix and PO sucralfate  - Daily CMP. Low potassium on admit. Will replace IV until patient tolerates PO.   - NS @125  - Clear liquid diet as tolerated  - Check FHT daily            Juan Ruth MD  Obstetrics [facility]

## 2018-05-17 NOTE — ED TRIAGE NOTES
Pt reports n/v during duration of this preganncy with syncope episodes increasing over the last few days with 4 episodes yesterday. Reports phenergan for nausea and last took it yesterday. Reports oral intake comes right back up. On way to ER today pt had syncope episode in [place] and fell onto stomach. Pt c/o generalized abd discomfort. Pt denies any vaginal bleeding or discharge prior to coming to ED.

## 2018-05-17 NOTE — PLAN OF CARE
PCP.  Lizet Abreu MD    OB:  Adeline Boothe MD    Previous hospital stay for hyperemesis.  Frequent syncope for this stay despite home phenergan.  Significant other works 12 hour shifts and multiple shifts.  Patient is from [place] and family there. Significant other has limited family here.  She does have a friend she may stay with.  Has aunt local    Significant other will pick. Up.    Needs help with transport to MD visits.  Needs [facility] non emergent transport info.         05/17/18 8611   Discharge Assessment   Assessment Type Discharge Planning Assessment   Confirmed/corrected address and phone number on facesheet? Yes   Assessment information obtained from? Patient   Communicated expected length of stay with patient/caregiver yes   Prior to hospitilization cognitive status: Alert/Oriented   Prior to hospitalization functional status: Independent  (has had frequently syncope. )   Current cognitive status: Alert/Oriented   Current Functional Status: Independent   Lives With alone   Able to Return to Prior Arrangements no  (frequent syncope episodes, significant other works 12 hour shifts and overtime.. )   Is patient able to care for self after discharge? Unable to determine at this time (comments)   Who are your caregiver(s) and their phone number(s)? Vaughn Enriquez [phone] significant other    Patient's perception of discharge disposition admitted as an inpatient   Readmission Within The Last 30 Days no previous admission in last 30 days   Patient currently being followed by outpatient case management? No   Patient currently receives any other outside agency services? No   Equipment Currently Used at Home none   Do you have any problems affording any of your prescribed medications? No   Is the patient taking medications as prescribed? yes   Does the patient have transportation home? Yes   Transportation Available family or friend will provide   Does the patient receive services at the [facility]? No   Discharge  Plan A Home  (may stay at friends house when significant other working.  )   Patient/Family In Agreement With Plan yes

## 2018-05-17 NOTE — HPI
Daija Purvis is a 27 y.o. F at 17w4d who presents complaining of persistent nausea and vomiting with repetitive syncopal episodes. She lost consciousness in the hospital lobby when she presented today. She was coming to the hospital because of her vomiting. She has been unable to keep solids down for the past two weeks, but liquids have been staying down until the past two days. She has been taking Diclegis and phenergan at home. She reports streaks of blood in her vomit for 2 weeks, but complains of increased amounts of blood today. Patient reports having regular syncopal episodes during this pregnancy related to weakness. She has at least one episode of feeling faint per day, but does not always lose consciousness.     She also complains of abdominal pain after falling. Reports she fell directly onto her abdomen from standing. The pain is sharp, cramping, and diffuse. She denies vaginal bleeding.

## 2018-05-17 NOTE — MEDICAL/APP STUDENT
"Daija Purvis  27 y.o. female    History     Chief Complaint   Patient presents with    Loss of Consciousness     Pt called over head in hospital [facility] w/ Rapid response, pt found on floor unresponsive by hospital staff, awakened easily with eyes open to verbal stimuli. aaox4 states," I just passed out I think this has been happening". Reports + 17 wks pregnant     HPI   27yoF  at 17w4d with no PMHx presented to ED following rapid response in hospital [facility], due to a syncopal episode which involved pt landing on her stomach. Pt complains of 6/10 pain in abdomen, 10/10 on palpation. Pt notes that she has been having syncopal episodes multiple times a day for a few weeks, which she thinks is due to her not eating properly. Pt states that symptoms have been very bad for the past day, she passed out 4x yesterday, 4x today. Pt reports that she has had significant n/v throughout pregnancy, has not been able to eat and drink properly for the past few weeks. Pt has also been vomiting blood for a few weeks, today she reports throwing up more blood than usual. Pt denies abdominal pain before falling today, denies vaginal bleeding.    Pt states that she fell flat on the floor and hit her abdomen. Her friend who was next to her was able to protect her face before she hit the floor. Pt says that the pain started after her fall, describes pain as "sharp, makes me roll into the fetal position." Pt is concerned about her syncopal episodes because she lives alone.     History reviewed. No pertinent past medical history.    History reviewed. No pertinent surgical history.    History reviewed. No pertinent family history.     Allergies: codeine    Social History   Substance Use Topics    Smoking status: Past smoker, quit one month ago    Smokeless tobacco: Not on file    Alcohol use History of use, quit one month ago   - Patient lives alone in house with many sharp objects  - Boyfriend works 12hr shifts    Review of " "Systems   Constitutional: Positive for appetite change and fatigue. Negative for fever.   Respiratory: Negative for shortness of breath.    Cardiovascular: Positive for chest pain ("burning").   Gastrointestinal: Positive for abdominal pain (6/10, 10/10 on palpation), constipation (2 weeks), nausea and vomiting.   Genitourinary: Negative for difficulty urinating, vaginal bleeding and vaginal discharge.   Neurological: Positive for syncope and light-headedness. Negative for headaches.       Physical Exam   /76   Pulse 98   Temp 97.3 °F (36.3 °C)   Ht 5' 1" (1.549 m)   Wt 68 kg (150 lb)   LMP 01/07/2018   SpO2 100%   BMI 28.34 kg/m²     Recent Results (from the past 24 hour(s))   CBC auto differential    Collection Time: 05/17/18  9:40 AM   Result Value Ref Range    WBC 8.94 3.90 - 12.70 K/uL    RBC 4.97 4.00 - 5.40 M/uL    Hemoglobin 15.6 12.0 - 16.0 g/dL    Hematocrit 42.2 37.0 - 48.5 %    MCV 85 82 - 98 fL    MCH 31.4 (H) 27.0 - 31.0 pg    MCHC 37.0 (H) 32.0 - 36.0 g/dL    RDW 13.3 11.5 - 14.5 %    Platelets 281 150 - 350 K/uL    MPV 12.5 9.2 - 12.9 fL    Gran # (ANC) 6.5 1.8 - 7.7 K/uL    Lymph # 1.6 1.0 - 4.8 K/uL    Mono # 0.8 0.3 - 1.0 K/uL    Eos # 0.0 0.0 - 0.5 K/uL    Baso # 0.00 0.00 - 0.20 K/uL    Gran% 72.9 38.0 - 73.0 %    Lymph% 18.1 18.0 - 48.0 %    Mono% 8.7 4.0 - 15.0 %    Eosinophil% 0.0 0.0 - 8.0 %    Basophil% 0.0 0.0 - 1.9 %    Platelet Estimate Appears normal     Large/Giant Platelets Present     Differential Method Automated    Comprehensive metabolic panel    Collection Time: 05/17/18  9:40 AM   Result Value Ref Range    Sodium 132 (L) 136 - 145 mmol/L    Potassium 3.1 (L) 3.5 - 5.1 mmol/L    Chloride 99 95 - 110 mmol/L    CO2 10 (L) 23 - 29 mmol/L    Glucose 93 70 - 110 mg/dL    BUN, Bld 13 6 - 20 mg/dL    Creatinine 0.9 0.5 - 1.4 mg/dL    Calcium 11.4 (H) 8.7 - 10.5 mg/dL    Total Protein 9.4 (H) 6.0 - 8.4 g/dL    Albumin 4.7 3.5 - 5.2 g/dL    Total Bilirubin 0.8 0.1 - 1.0 mg/dL "    Alkaline Phosphatase 61 55 - 135 U/L    AST 86 (H) 10 - 40 U/L     (H) 10 - 44 U/L    Anion Gap 23 (H) 8 - 16 mmol/L    eGFR if African American >60 >60 mL/min/1.73 m^2    eGFR if non African American >60 >60 mL/min/1.73 m^2   Protime-INR    Collection Time: 05/17/18  9:40 AM   Result Value Ref Range    Prothrombin Time 10.8 9.0 - 12.5 sec    INR 1.0 0.8 - 1.2   CPK    Collection Time: 05/17/18  9:40 AM   Result Value Ref Range    CPK 29 20 - 180 U/L   Troponin I    Collection Time: 05/17/18  9:53 AM   Result Value Ref Range    Troponin I 0.030 (H) 0.000 - 0.026 ng/mL   Urinalysis - Clean Catch    Collection Time: 05/17/18 10:52 AM   Result Value Ref Range    Specimen UA Urine, Clean Catch     Color, UA Yellow Yellow, Straw, Yesi    Appearance, UA Clear Clear    pH, UA 6.0 5.0 - 8.0    Specific Gravity, UA >=1.030 (A) 1.005 - 1.030    Protein, UA 2+ (A) Negative    Glucose, UA Negative Negative    Ketones, UA 3+ (A) Negative    Bilirubin (UA) 1+ (A) Negative    Occult Blood UA Trace (A) Negative    Nitrite, UA Negative Negative    Urobilinogen, UA Negative <2.0 EU/dL    Leukocytes, UA 1+ (A) Negative   Urinalysis Microscopic    Collection Time: 05/17/18 10:52 AM   Result Value Ref Range    RBC, UA 3 0 - 4 /hpf    WBC, UA 30 (H) 0 - 5 /hpf    Bacteria, UA Moderate (A) None-Occ /hpf    Hyaline Casts, UA 25 (A) 0-1/lpf /lpf    Granular Casts, UA 2 (A) None /lpf    Trichomonas, UA Few (A) None    Microscopic Comment SEE COMMENT        Physical Exam    Constitutional: She appears well-developed.   HENT:   Mouth/Throat: Mucous membranes are dry.   Cardiovascular: Regular rhythm and normal heart sounds. Tachycardia present.    Pulmonary/Chest: Effort normal and breath sounds normal. No respiratory distress.   Abdominal: Normal appearance. There is tenderness in the periumbilical area and suprapubic area.   Neurological: She is alert and oriented to person, place, and time.       Assessment and  Plan:    Hyperemesis gravidarum with dehydration  - Admit to hospital for nutritional resuscitation  - Thiamine repletion with banana bag  - Zofran and Phenergan for nausea  - Protonix for heartburn  - Senna-docusate for constipation    Hyponatremia  - Sodium chloride IV    Hypokalemia  - Potassium chloride IV

## 2018-05-17 NOTE — PROGRESS NOTES
Patient arrived to Antepartum Unit via stretcher per transport. Patient transported to bed. Admit vitals assessed. Will continue to monitor.

## 2018-05-17 NOTE — HOSPITAL COURSE
05/17/2018 - Admitted for fluid resuscitation and management of hyperemesis. Later in evening elevated troponins and abnormal EKG suggestive of STEMI noted, aspiring and sublingual nitroglycerin given, cardiology and EICU consulted, patient transferred to ICU for monitoring  05/18/2018 - Doing well this morning. Nausea improved, tolerating clears. CTA negative for acute processes. Continue monitoring in ICU. Troponin trending down. Cardiology reviewed records, and did not think STEMI occurred. They attributed EKG changes to severe electrolyte deficiencies. Phos repleted overnight - initially to 2.5, however a repeat value was <1. Phos repleted a second time.   05/19/2018 - Stepped down to floor. Tolerating regular diet. No vomiting in >24 hrs. Phos continues to be low despite repetitive replacement. PTH normal. Vit D deficient, <6.  05/21/2018 - HD#5. Vomiting overnight. Nausea slightly improved with thorazine. Denies chest pain or palpitations. Continue replenishing lytes.

## 2018-05-18 PROBLEM — N30.00 ACUTE CYSTITIS WITHOUT HEMATURIA: Status: ACTIVE | Noted: 2018-05-18

## 2018-05-18 PROBLEM — E43 SEVERE MALNUTRITION: Status: ACTIVE | Noted: 2018-05-18

## 2018-05-18 PROBLEM — O21.1 HYPEREMESIS GRAVIDARUM WITH DEHYDRATION: Status: ACTIVE | Noted: 2018-05-18

## 2018-05-18 LAB
ALBUMIN SERPL BCP-MCNC: 2.7 G/DL
ALBUMIN SERPL BCP-MCNC: 3.3 G/DL
ALBUMIN SERPL BCP-MCNC: 3.3 G/DL
ALP SERPL-CCNC: 30 U/L
ALP SERPL-CCNC: 40 U/L
ALP SERPL-CCNC: 41 U/L
ALT SERPL W/O P-5'-P-CCNC: 104 U/L
ALT SERPL W/O P-5'-P-CCNC: 72 U/L
ALT SERPL W/O P-5'-P-CCNC: 93 U/L
ANION GAP SERPL CALC-SCNC: 5 MMOL/L
ANION GAP SERPL CALC-SCNC: 6 MMOL/L
ANION GAP SERPL CALC-SCNC: 9 MMOL/L
AST SERPL-CCNC: 39 U/L
AST SERPL-CCNC: 46 U/L
AST SERPL-CCNC: 52 U/L
BASOPHILS # BLD AUTO: 0.01 K/UL
BASOPHILS # BLD AUTO: 0.01 K/UL
BASOPHILS NFR BLD: 0.1 %
BASOPHILS NFR BLD: 0.1 %
BILIRUB SERPL-MCNC: 0.6 MG/DL
BILIRUB SERPL-MCNC: 0.7 MG/DL
BILIRUB SERPL-MCNC: 0.8 MG/DL
BUN SERPL-MCNC: 4 MG/DL
BUN SERPL-MCNC: 6 MG/DL
BUN SERPL-MCNC: 7 MG/DL
CALCIUM SERPL-MCNC: 7.6 MG/DL
CALCIUM SERPL-MCNC: 8.6 MG/DL
CALCIUM SERPL-MCNC: 8.9 MG/DL
CHLORIDE SERPL-SCNC: 109 MMOL/L
CHLORIDE SERPL-SCNC: 109 MMOL/L
CHLORIDE SERPL-SCNC: 115 MMOL/L
CO2 SERPL-SCNC: 14 MMOL/L
CO2 SERPL-SCNC: 14 MMOL/L
CO2 SERPL-SCNC: 15 MMOL/L
CREAT SERPL-MCNC: 0.5 MG/DL
CREAT SERPL-MCNC: 0.6 MG/DL
CREAT SERPL-MCNC: 0.7 MG/DL
DIFFERENTIAL METHOD: ABNORMAL
DIFFERENTIAL METHOD: ABNORMAL
EOSINOPHIL # BLD AUTO: 0 K/UL
EOSINOPHIL # BLD AUTO: 0 K/UL
EOSINOPHIL NFR BLD: 0.3 %
EOSINOPHIL NFR BLD: 0.3 %
ERYTHROCYTE [DISTWIDTH] IN BLOOD BY AUTOMATED COUNT: 13.6 %
ERYTHROCYTE [DISTWIDTH] IN BLOOD BY AUTOMATED COUNT: 13.7 %
EST. GFR  (AFRICAN AMERICAN): >60 ML/MIN/1.73 M^2
EST. GFR  (NON AFRICAN AMERICAN): >60 ML/MIN/1.73 M^2
GLUCOSE SERPL-MCNC: 69 MG/DL
GLUCOSE SERPL-MCNC: 91 MG/DL
GLUCOSE SERPL-MCNC: 98 MG/DL
HCT VFR BLD AUTO: 26.3 %
HCT VFR BLD AUTO: 31.3 %
HGB BLD-MCNC: 11.4 G/DL
HGB BLD-MCNC: 9.5 G/DL
LACTATE SERPL-SCNC: 1.3 MMOL/L
LYMPHOCYTES # BLD AUTO: 2 K/UL
LYMPHOCYTES # BLD AUTO: 2.3 K/UL
LYMPHOCYTES NFR BLD: 27.2 %
LYMPHOCYTES NFR BLD: 29.9 %
MAGNESIUM SERPL-MCNC: 1.6 MG/DL
MCH RBC QN AUTO: 30.8 PG
MCH RBC QN AUTO: 31.5 PG
MCHC RBC AUTO-ENTMCNC: 36.1 G/DL
MCHC RBC AUTO-ENTMCNC: 36.4 G/DL
MCV RBC AUTO: 85 FL
MCV RBC AUTO: 87 FL
MONOCYTES # BLD AUTO: 0.8 K/UL
MONOCYTES # BLD AUTO: 1 K/UL
MONOCYTES NFR BLD: 10.5 %
MONOCYTES NFR BLD: 12.3 %
NEUTROPHILS # BLD AUTO: 4.5 K/UL
NEUTROPHILS # BLD AUTO: 4.5 K/UL
NEUTROPHILS NFR BLD: 57.1 %
NEUTROPHILS NFR BLD: 61.6 %
PHOSPHATE SERPL-MCNC: 2.5 MG/DL
PHOSPHATE SERPL-MCNC: <1 MG/DL
PLATELET # BLD AUTO: 170 K/UL
PLATELET # BLD AUTO: 174 K/UL
PMV BLD AUTO: 11.3 FL
PMV BLD AUTO: 12.5 FL
POTASSIUM SERPL-SCNC: 3 MMOL/L
POTASSIUM SERPL-SCNC: 3.4 MMOL/L
POTASSIUM SERPL-SCNC: 4.1 MMOL/L
PROT SERPL-MCNC: 4.9 G/DL
PROT SERPL-MCNC: 6.1 G/DL
PROT SERPL-MCNC: 6.2 G/DL
RBC # BLD AUTO: 3.08 M/UL
RBC # BLD AUTO: 3.62 M/UL
SODIUM SERPL-SCNC: 130 MMOL/L
SODIUM SERPL-SCNC: 132 MMOL/L
SODIUM SERPL-SCNC: 134 MMOL/L
TROPONIN I SERPL DL<=0.01 NG/ML-MCNC: 0.04 NG/ML
TROPONIN I SERPL DL<=0.01 NG/ML-MCNC: 0.04 NG/ML
TROPONIN I SERPL DL<=0.01 NG/ML-MCNC: 0.05 NG/ML
WBC # BLD AUTO: 7.31 K/UL
WBC # BLD AUTO: 7.82 K/UL

## 2018-05-18 PROCEDURE — 36569 INSJ PICC 5 YR+ W/O IMAGING: CPT

## 2018-05-18 PROCEDURE — 84100 ASSAY OF PHOSPHORUS: CPT | Mod: 91

## 2018-05-18 PROCEDURE — 99233 SBSQ HOSP IP/OBS HIGH 50: CPT | Mod: ,,, | Performed by: INTERNAL MEDICINE

## 2018-05-18 PROCEDURE — 25000003 PHARM REV CODE 250

## 2018-05-18 PROCEDURE — 20000000 HC ICU ROOM

## 2018-05-18 PROCEDURE — 36415 COLL VENOUS BLD VENIPUNCTURE: CPT

## 2018-05-18 PROCEDURE — 80053 COMPREHEN METABOLIC PANEL: CPT | Mod: 91

## 2018-05-18 PROCEDURE — 94761 N-INVAS EAR/PLS OXIMETRY MLT: CPT

## 2018-05-18 PROCEDURE — 25000003 PHARM REV CODE 250: Performed by: OBSTETRICS & GYNECOLOGY

## 2018-05-18 PROCEDURE — C9113 INJ PANTOPRAZOLE SODIUM, VIA: HCPCS | Performed by: STUDENT IN AN ORGANIZED HEALTH CARE EDUCATION/TRAINING PROGRAM

## 2018-05-18 PROCEDURE — 84484 ASSAY OF TROPONIN QUANT: CPT | Mod: 91

## 2018-05-18 PROCEDURE — 85025 COMPLETE CBC W/AUTO DIFF WBC: CPT | Mod: 91

## 2018-05-18 PROCEDURE — 25000003 PHARM REV CODE 250: Performed by: STUDENT IN AN ORGANIZED HEALTH CARE EDUCATION/TRAINING PROGRAM

## 2018-05-18 PROCEDURE — 76937 US GUIDE VASCULAR ACCESS: CPT

## 2018-05-18 PROCEDURE — 63600175 PHARM REV CODE 636 W HCPCS: Performed by: STUDENT IN AN ORGANIZED HEALTH CARE EDUCATION/TRAINING PROGRAM

## 2018-05-18 PROCEDURE — 25500020 PHARM REV CODE 255: Performed by: OBSTETRICS & GYNECOLOGY

## 2018-05-18 PROCEDURE — C9113 INJ PANTOPRAZOLE SODIUM, VIA: HCPCS | Performed by: INTERNAL MEDICINE

## 2018-05-18 PROCEDURE — 93005 ELECTROCARDIOGRAM TRACING: CPT

## 2018-05-18 PROCEDURE — 93306 TTE W/DOPPLER COMPLETE: CPT

## 2018-05-18 PROCEDURE — 63600175 PHARM REV CODE 636 W HCPCS: Performed by: OBSTETRICS & GYNECOLOGY

## 2018-05-18 PROCEDURE — 80053 COMPREHEN METABOLIC PANEL: CPT

## 2018-05-18 PROCEDURE — 63600175 PHARM REV CODE 636 W HCPCS: Performed by: INTERNAL MEDICINE

## 2018-05-18 PROCEDURE — 83735 ASSAY OF MAGNESIUM: CPT

## 2018-05-18 PROCEDURE — 84100 ASSAY OF PHOSPHORUS: CPT

## 2018-05-18 PROCEDURE — 83605 ASSAY OF LACTIC ACID: CPT

## 2018-05-18 PROCEDURE — 93010 ELECTROCARDIOGRAM REPORT: CPT | Mod: ,,, | Performed by: INTERNAL MEDICINE

## 2018-05-18 PROCEDURE — C1751 CATH, INF, PER/CENT/MIDLINE: HCPCS

## 2018-05-18 PROCEDURE — 99231 SBSQ HOSP IP/OBS SF/LOW 25: CPT | Mod: ,,, | Performed by: OBSTETRICS & GYNECOLOGY

## 2018-05-18 PROCEDURE — 97802 MEDICAL NUTRITION INDIV IN: CPT

## 2018-05-18 RX ORDER — SODIUM,POTASSIUM PHOSPHATES 280-250MG
1 POWDER IN PACKET (EA) ORAL ONCE
Status: COMPLETED | OUTPATIENT
Start: 2018-05-18 | End: 2018-05-18

## 2018-05-18 RX ORDER — DEXTROSE MONOHYDRATE 50 MG/ML
INJECTION, SOLUTION INTRAVENOUS CONTINUOUS
Status: DISCONTINUED | OUTPATIENT
Start: 2018-05-19 | End: 2018-05-20

## 2018-05-18 RX ORDER — POTASSIUM CHLORIDE 20 MEQ/1
20 TABLET, EXTENDED RELEASE ORAL
Status: COMPLETED | OUTPATIENT
Start: 2018-05-18 | End: 2018-05-18

## 2018-05-18 RX ORDER — LIDOCAINE HYDROCHLORIDE 10 MG/ML
INJECTION INFILTRATION; PERINEURAL
Status: COMPLETED
Start: 2018-05-18 | End: 2018-05-18

## 2018-05-18 RX ORDER — PANTOPRAZOLE SODIUM 40 MG/10ML
40 INJECTION, POWDER, LYOPHILIZED, FOR SOLUTION INTRAVENOUS DAILY
Status: DISCONTINUED | OUTPATIENT
Start: 2018-05-18 | End: 2018-05-18

## 2018-05-18 RX ORDER — NITROFURANTOIN 25; 75 MG/1; MG/1
100 CAPSULE ORAL EVERY 12 HOURS
Status: DISCONTINUED | OUTPATIENT
Start: 2018-05-18 | End: 2018-05-21 | Stop reason: HOSPADM

## 2018-05-18 RX ORDER — LIDOCAINE HYDROCHLORIDE 10 MG/ML
5 INJECTION, SOLUTION EPIDURAL; INFILTRATION; INTRACAUDAL; PERINEURAL ONCE
Status: DISCONTINUED | OUTPATIENT
Start: 2018-05-18 | End: 2018-05-21 | Stop reason: HOSPADM

## 2018-05-18 RX ORDER — POTASSIUM CHLORIDE 7.45 MG/ML
10 INJECTION INTRAVENOUS
Status: COMPLETED | OUTPATIENT
Start: 2018-05-18 | End: 2018-05-18

## 2018-05-18 RX ORDER — ENOXAPARIN SODIUM 100 MG/ML
40 INJECTION SUBCUTANEOUS EVERY 24 HOURS
Status: DISCONTINUED | OUTPATIENT
Start: 2018-05-18 | End: 2018-05-21 | Stop reason: HOSPADM

## 2018-05-18 RX ORDER — MAGNESIUM SULFATE HEPTAHYDRATE 40 MG/ML
2 INJECTION, SOLUTION INTRAVENOUS ONCE
Status: COMPLETED | OUTPATIENT
Start: 2018-05-18 | End: 2018-05-18

## 2018-05-18 RX ORDER — ONDANSETRON 4 MG/1
4 TABLET, ORALLY DISINTEGRATING ORAL EVERY 6 HOURS
Status: DISCONTINUED | OUTPATIENT
Start: 2018-05-18 | End: 2018-05-21

## 2018-05-18 RX ADMIN — POTASSIUM & SODIUM PHOSPHATES POWDER PACK 280-160-250 MG 1 PACKET: 280-160-250 PACK at 11:05

## 2018-05-18 RX ADMIN — POTASSIUM CHLORIDE 10 MEQ: 7.46 INJECTION, SOLUTION INTRAVENOUS at 05:05

## 2018-05-18 RX ADMIN — DEXTROSE: 5 SOLUTION INTRAVENOUS at 11:05

## 2018-05-18 RX ADMIN — ONDANSETRON 4 MG: 4 TABLET, ORALLY DISINTEGRATING ORAL at 11:05

## 2018-05-18 RX ADMIN — SODIUM CHLORIDE: 0.9 INJECTION, SOLUTION INTRAVENOUS at 04:05

## 2018-05-18 RX ADMIN — ACETAMINOPHEN 650 MG: 325 TABLET, FILM COATED ORAL at 11:05

## 2018-05-18 RX ADMIN — SUCRALFATE 1 G: 1 TABLET ORAL at 11:05

## 2018-05-18 RX ADMIN — SODIUM CHLORIDE: 0.9 INJECTION, SOLUTION INTRAVENOUS at 07:05

## 2018-05-18 RX ADMIN — NITROFURANTOIN (MONOHYDRATE/MACROCRYSTALS) 100 MG: 75; 25 CAPSULE ORAL at 08:05

## 2018-05-18 RX ADMIN — POTASSIUM CHLORIDE 20 MEQ: 1500 TABLET, EXTENDED RELEASE ORAL at 08:05

## 2018-05-18 RX ADMIN — LIDOCAINE HYDROCHLORIDE 10 MG: 10 INJECTION, SOLUTION INFILTRATION; PERINEURAL at 10:05

## 2018-05-18 RX ADMIN — NITROFURANTOIN (MONOHYDRATE/MACROCRYSTALS) 100 MG: 75; 25 CAPSULE ORAL at 09:05

## 2018-05-18 RX ADMIN — POTASSIUM CHLORIDE 10 MEQ: 7.46 INJECTION, SOLUTION INTRAVENOUS at 02:05

## 2018-05-18 RX ADMIN — PROMETHAZINE HYDROCHLORIDE 25 MG: 25 INJECTION INTRAMUSCULAR; INTRAVENOUS at 11:05

## 2018-05-18 RX ADMIN — PRENATAL VIT W/ FE FUMARATE-FA TAB 27-0.8 MG 1 TABLET: 27-0.8 TAB at 08:05

## 2018-05-18 RX ADMIN — PANTOPRAZOLE SODIUM 40 MG: 40 INJECTION, POWDER, FOR SOLUTION INTRAVENOUS at 08:05

## 2018-05-18 RX ADMIN — ONDANSETRON 4 MG: 4 TABLET, ORALLY DISINTEGRATING ORAL at 05:05

## 2018-05-18 RX ADMIN — POTASSIUM CHLORIDE 20 MEQ: 1500 TABLET, EXTENDED RELEASE ORAL at 05:05

## 2018-05-18 RX ADMIN — SUCRALFATE 1 G: 1 TABLET ORAL at 06:05

## 2018-05-18 RX ADMIN — SUCRALFATE 1 G: 1 TABLET ORAL at 08:05

## 2018-05-18 RX ADMIN — Medication 100 MG: at 08:05

## 2018-05-18 RX ADMIN — POTASSIUM CHLORIDE 10 MEQ: 7.46 INJECTION, SOLUTION INTRAVENOUS at 03:05

## 2018-05-18 RX ADMIN — ONDANSETRON HYDROCHLORIDE 4 MG: 2 INJECTION, SOLUTION INTRAMUSCULAR; INTRAVENOUS at 12:05

## 2018-05-18 RX ADMIN — POTASSIUM CHLORIDE 20 MEQ: 1500 TABLET, EXTENDED RELEASE ORAL at 06:05

## 2018-05-18 RX ADMIN — POTASSIUM CHLORIDE 10 MEQ: 7.46 INJECTION, SOLUTION INTRAVENOUS at 07:05

## 2018-05-18 RX ADMIN — SODIUM CHLORIDE, SODIUM LACTATE, POTASSIUM CHLORIDE, AND CALCIUM CHLORIDE 500 ML: .6; .31; .03; .02 INJECTION, SOLUTION INTRAVENOUS at 02:05

## 2018-05-18 RX ADMIN — MAGNESIUM SULFATE IN WATER 2 G: 40 INJECTION, SOLUTION INTRAVENOUS at 01:05

## 2018-05-18 RX ADMIN — SODIUM PHOSPHATE, MONOBASIC, MONOHYDRATE AND SODIUM PHOSPHATE, DIBASIC, ANHYDROUS 30 MMOL: 276; 142 INJECTION, SOLUTION INTRAVENOUS at 02:05

## 2018-05-18 RX ADMIN — PROMETHAZINE HYDROCHLORIDE 25 MG: 25 INJECTION INTRAMUSCULAR; INTRAVENOUS at 05:05

## 2018-05-18 RX ADMIN — PROMETHAZINE HYDROCHLORIDE 25 MG: 25 INJECTION INTRAMUSCULAR; INTRAVENOUS at 12:05

## 2018-05-18 RX ADMIN — IOHEXOL 75 ML: 350 INJECTION, SOLUTION INTRAVENOUS at 05:05

## 2018-05-18 NOTE — EICU
EKG:  Diffuse non specific ST elevation.  No convexity. T wave down in inferior leads.  No definite p wave-junctional.  ? Pericardial     Plan:  - get Cardiology eval stat ( discussed with [facility] doc.  He is calling cardiology).   - stat troponin I  - follow EKG in 20 mins  - sats 99%. HR 76. BP 91/56, MAP 67.       03.40  As per cardiology.  EKG is non specific and no need to repeat troponin or follow up EKG.  Due to junctional rhythm, T wave inversions and syncopies- would go for CTPA.  It should able to  pericardial status also like effusion.     06:21  CTPA; no major PE.  Sub optimal study.  Continue care.

## 2018-05-18 NOTE — EICU
Brief new admit eval: and folllow up.    Earlier notes, labs, meds reviewed.  Fellowon call  discussed also.    Camera Eval:  Now feeling fine.  No chest pain.  No sob.  VS stable.  Discussed with bed side RN.    Labs:  Potassium still low at 3 after 4 doses of IV kcl.   Troponin trending down to 0.049  EKG: reviewed.  Follow up EKG improved T wave inversions resolving.    A/P  1. Hyperemesis gravidarum/electrolyte imbalance/ dehydration  - improving  2. ? STEMI.  Looks more like j point elevation.  T wave inversions in Inferior and anetrior leads better after Kcl /fluid replacement.  Unlikely PE.  No sob.  Did had syncopal episodes.  - 2d echo in am  - trending down troponin.   - cardiology aware. In ICU for telemetry.  To be followed in am  - consider V/Q scan if echo shows rt ventricular strain.     3. Hypokalemia  - will replace again with IV kcl x 4 doses.  Follow level in am. Check mg level also.

## 2018-05-18 NOTE — EICU
Obg doctor called to notify: rt shoulder punching pain. SBP 96.  Discussed about recurring syncopy. Creatinine wnl.  This time she did loose consciousness.  Initial EKG showed sinus tachy and inferior- ant T wave strain/inversions. Troponin slightly down( 3 rd one).  PE possible.  Need to be ruled out.      Plan:  - get EKG stat  - saline 500 ml bolus  - get CTPA to rule out PE ( Obgyn physician cleared to get CTPA-low radiation - pregnancy status).

## 2018-05-18 NOTE — SUBJECTIVE & OBJECTIVE
Obstetric HPI:  Patient is stable this morning. She is tolerating liquids without nausea or vomiting. Denies chest pain and shortness of breath. Denies abdominal pain or vaginal bleeding.      Objective:     Vital Signs (Most Recent):  Temp: 98.6 °F (37 °C) (05/18/18 0301)  Pulse: 84 (05/18/18 0601)  Resp: (!) 23 (05/18/18 0601)  BP: (!) 86/64 (05/18/18 0501)  SpO2: 100 % (05/18/18 0601) Vital Signs (24h Range):  Temp:  [97.2 °F (36.2 °C)-98.6 °F (37 °C)] 98.6 °F (37 °C)  Pulse:  [] 84  Resp:  [11-24] 23  SpO2:  [89 %-100 %] 100 %  BP: ()/(55-92) 86/64     Weight: 67.7 kg (149 lb 4 oz)  Body mass index is 28.2 kg/m².      Intake/Output Summary (Last 24 hours) at 05/18/18 0702  Last data filed at 05/18/18 0240   Gross per 24 hour   Intake              850 ml   Output              850 ml   Net                0 ml       Significant Labs:  Recent Lab Results       05/18/18  0616 05/17/18  2359 05/17/18  1930 05/17/18  1634 05/17/18  1052      Alcohol, Urine   <10       Benzodiazepines   Negative       Methadone metabolites   Negative       Phencyclidine   Negative       Albumin  3.3(L)        Alkaline Phosphatase  40(L)        ALT  104(H)        Amphetamine Screen, Ur   Negative       Anion Gap  6(L)        Appearance, UA     Clear     AST  52(H)        Bacteria, UA     Moderate(A)     Barbiturate Screen, Ur   Negative       Baso #          Basophil%          Bilirubin (UA)     1+  Comment:  Positive urine bilirubin is not confirmed. Correlate with   serum bilirubin and clinical presentation.  (A)     Total Bilirubin  0.7  Comment:  For infants and newborns, interpretation of results should be based  on gestational age, weight and in agreement with clinical  observations.  Premature Infant recommended reference ranges:  Up to 24 hours.............<8.0 mg/dL  Up to 48 hours............<12.0 mg/dL  3-5 days..................<15.0 mg/dL  6-29 days.................<15.0 mg/dL          BUN, Bld  7        Calcium   8.6(L)        Chloride  109        CO2  15(L)        Cocaine (Metab.)   Negative       Color, UA     Yellow     CPK          Creatinine  0.7        Creatinine, Random Ur   88.6  Comment:  The random urine reference ranges provided were established   for 24 hour urine collections.  No reference ranges exist for  random urine specimens.  Correlate clinically.         Differential Method          eGFR if   >60        eGFR if non   >60  Comment:  Calculation used to obtain the estimated glomerular filtration  rate (eGFR) is the CKD-EPI equation.           Eos #          Eosinophil%          Large/Giant Platelets          Glucose  98        Glucose, UA     Negative     Gran # (ANC)          Gran%          Granular Casts, UA     2(A)     Hematocrit          Hemoglobin          Hyaline Casts, UA     25(A)     Coumadin Monitoring INR          Ketones, UA     3+(A)     Leukocytes, UA     1+(A)     Lymph #          Lymph%          MCH          MCHC          MCV          Microscopic Comment     SEE COMMENT  Comment:  Other formed elements not mentioned in the report are not   present in the microscopic examination.        Mono #          Mono%          MPV          Nitrite, UA     Negative     Occult Blood UA     Trace(A)     Opiate Scrn, Ur   Negative       pH, UA     6.0     Platelet Estimate          Platelets          Potassium  3.0(L)        Total Protein  6.2        Protein, UA     2+  Comment:  Recommend a 24 hour urine protein or a urine   protein/creatinine ratio if globulin induced proteinuria is  clinically suspected.  (A)     Protime          RBC          RBC, UA     3     RDW          Sodium  130(L)        Specific Gravity, UA     >=1.030(A)     Specimen UA     Urine, Clean Catch     Marijuana (THC) Metabolite   Presumptive Positive       Toxicology Information   SEE COMMENT  Comment:  This screen includes the following classes of drugs at the   listed cut-off:  Benzodiazepines                   200 ng/ml  Methadone                        300 ng/ml  Cocaine metabolite               300 ng/ml  Opiates                          300 ng/ml  Barbiturates                     200 ng/ml  Amphetamines                    1000 ng/ml  Marijuana metabs (THC)            50 ng/ml  Phencyclidine (PCP)               25 ng/ml  High concentrations of Diphenhydramine may cross-react with  Phencyclidine PCP screening immunoassay giving a false   positive result.  High concentrations of Methylenedioxymethamphetamine (MDMA aka  Ectasy) and other structurally similar compounds may cross-   react with the Amphetamine/Methamphetamine screening   immunoassay giving a false positive result.  A metabolite of the anti-HIV drug Sustiva () may cause  false positive results in the Marijuana metabolite (THC)   screening assay.  Note: This exception list includes only more common   interferants in toxicology screen testing.  Because of many   cross-reactantspositive results on toxicology drug screens   should be confirmed whenever results do not correlate with   clinical presentation.  This report is intended for use in clinical monitoring and  management of patients. It is not intended for use in   employment related drug testing.  Because of any cross-reactants, positive results on toxicology  drug screens should be confirmed whenever results do not  correlate with clinical presentation.  Presumptive positive results are unconfirmed and may be used   only for medical purposes.         Trichomonas, UA     Few(A)     Troponin I 0.050  Comment:  The reference interval for Troponin I represents the 99th percentile   cutoff   for our facility and is consistent with 3rd generation assay   performance.  (H) 0.050  Comment:  The reference interval for Troponin I represents the 99th percentile   cutoff   for our facility and is consistent with 3rd generation assay   performance.  (H)  0.058  Comment:  The reference interval for  Troponin I represents the 99th percentile   cutoff   for our facility and is consistent with 3rd generation assay   performance.  (H)        0.049  Comment:  The reference interval for Troponin I represents the 99th percentile   cutoff   for our facility and is consistent with 3rd generation assay   performance.  (H)        Urobilinogen, UA     Negative     WBC, UA     30(H)     WBC                      05/17/18  0953 05/17/18  0940      Alcohol, Urine       Benzodiazepines       Methadone metabolites       Phencyclidine       Albumin  4.7     Alkaline Phosphatase  61     ALT  162(H)     Amphetamine Screen, Ur       Anion Gap  23(H)     Appearance, UA       AST  86(H)     Bacteria, UA       Barbiturate Screen, Ur       Baso #  0.00     Basophil%  0.0     Bilirubin (UA)       Total Bilirubin  0.8  Comment:  For infants and newborns, interpretation of results should be based  on gestational age, weight and in agreement with clinical  observations.  Premature Infant recommended reference ranges:  Up to 24 hours.............<8.0 mg/dL  Up to 48 hours............<12.0 mg/dL  3-5 days..................<15.0 mg/dL  6-29 days.................<15.0 mg/dL       BUN, Bld  13     Calcium  11.4(H)     Chloride  99     CO2  10(L)     Cocaine (Metab.)       Color, UA       CPK  29     Creatinine  0.9     Creatinine, Random Ur       Differential Method  Automated     eGFR if   >60     eGFR if non   >60  Comment:  Calculation used to obtain the estimated glomerular filtration  rate (eGFR) is the CKD-EPI equation.        Eos #  0.0     Eosinophil%  0.0     Large/Giant Platelets  Present     Glucose  93     Glucose, UA       Gran # (ANC)  6.5     Gran%  72.9     Granular Casts, UA       Hematocrit  42.2     Hemoglobin  15.6     Hyaline Casts, UA       Coumadin Monitoring INR  1.0  Comment:  Coumadin Therapy:  2.0 - 3.0 for INR for all indicators except mechanical heart valves  and antiphospholipid  syndromes which should use 2.5 - 3.5.       Ketones, UA       Leukocytes, UA       Lymph #  1.6     Lymph%  18.1     MCH  31.4(H)     MCHC  37.0(H)     MCV  85     Microscopic Comment       Mono #  0.8     Mono%  8.7     MPV  12.5     Nitrite, UA       Occult Blood UA       Opiate Scrn, Ur       pH, UA       Platelet Estimate  Appears normal     Platelets  281     Potassium  3.1(L)     Total Protein  9.4(H)     Protein, UA       Protime  10.8     RBC  4.97     RBC, UA       RDW  13.3     Sodium  132(L)     Specific [place], UA       Specimen UA       Marijuana (THC) Metabolite       Toxicology Information       Trichomonas, UA       Troponin I 0.030  Comment:  The reference interval for Troponin I represents the 99th percentile   cutoff   for our facility and is consistent with 3rd generation assay   performance.  (H)      Urobilinogen, UA       WBC, UA       WBC  8.94           Physical Exam:   Constitutional: She is oriented to person, place, and time. She appears well-developed and well-nourished. No distress.    HENT:   Head: Atraumatic.    Eyes: EOM are normal. Pupils are equal, round, and reactive to light.    Neck: Normal range of motion. Neck supple.    Cardiovascular: Normal rate and regular rhythm.     Pulmonary/Chest: Effort normal. No respiratory distress.        Abdominal: Soft. She exhibits no distension and no abdominal incision. There is no tenderness.             Musculoskeletal: Normal range of motion. She exhibits no edema or tenderness.       Neurological: She is alert and oriented to person, place, and time.    Skin: Skin is warm and dry. She is not diaphoretic.        CTA: Impression     Technically limited examination secondary to suboptimal opacification of the pulmonary arterial vasculature.  No evidence of large central pulmonary embolus or convincing evidence of pulmonary thromboembolism to the level of the segmental arteries or pulmonary infarction. Pulmonary embolus distal to the  segmental levels, particularly at the lung bases cannot be excluded. Correlation with d-dimer and lower extremity venous Doppler ultrasound as clinically warranted.

## 2018-05-18 NOTE — ASSESSMENT & PLAN NOTE
- Continue fluid resuscitation and antiemetics  - S/p Banana bag  - Stagger IV phenergan and zofran q6 hours. Give IV until patient can tolerate PO medications  - Blood-streaked emesis likely caused by esophageal irritation. Will give IV protonix and PO sucralfate  - Daily CMP. Low potassium on admit. Will replace IV until patient tolerates PO.   - Clear liquid diet as tolerated  - Check FHT daily

## 2018-05-18 NOTE — PLAN OF CARE
Problem: Patient Care Overview  Goal: Plan of Care Review  Outcome: Ongoing (interventions implemented as appropriate)  Patient on RA. Sats 100%. Pt. in no distress, will continue to monitor.

## 2018-05-18 NOTE — PROGRESS NOTES
Spoke with Dr. Rivera. Per his evaluation he does not feel this is representative of a STEMI and is not a cardiac problem. Will discuss transitioning out of ICU once electrolytes stabilized.

## 2018-05-18 NOTE — CONSULTS
"HISTORY OF PRESENT ILLNESS:  Ms. Purvis is a 27-year-old lady that is 4   months' pregnant, was on her way to the Emergency Room with complaints of having   had several transient fainting spells at home, persistent nausea and vomiting.    She says that she has had a lot of nausea and vomiting ever since she became   pregnant.  Over the last 24 hours while alone at home, she says she blacked out   several times.  Each time she would get a warning lightheadedness and feeling   faint, blurry vision, and then she would black out.  She says she never has hurt   herself, she would wake up on the floor.  When she came to the Emergency Room,   she fell forward in the lobby, and was transiently unconscious.  She was seen in   the Emergency Room and given IV fluids and admitted to the Intensive Care Unit.    I was called when the patient complained of burning in the chest, "like   heartburn", on and off.  She was noted to have a minimal troponin elevation and   nonspecific electrocardiographic changes, she was admitted to the Intensive Care   Unit for observation.  In the past she said she has enjoyed good previous   health.  This is her first pregnancy.  She is a nonsmoker, does not drink   alcohol.  There is no illicit drugs, was on no medications prior to this   admission.  She has had no previous surgeries.    PHYSICAL EXAMINATION:  GENERAL:  Reveals an alert, pleasant, young lady in no acute distress.  VITAL SIGNS:  Blood pressure 120/50 and a pulse of 76 beats per minute.  HEENT:  The sclerae is nonicteric.  The conjunctivae were pink.  The ENT exam is   unremarkable.  NECK:  Supple.  There is no jugular venous distention.  The carotid upstroke is   brisk.  There are no carotid bruits.  CHEST:  Clear.  HEART:  Size is grossly normal.  S1 and S2 are normal.  There is no audible   murmur or gallop.  ABDOMEN:  Soft and nontender.  The bowel sounds are normal.  EXTREMITIES:  There is no clubbing, cyanosis or edema of " feet.  NEUROLOGIC:  Grossly, the neurological exam is intact.    The two electrocardiograms from last evening and the two this morning all show a   nonspecific repolarization abnormalities.  The electrocardiogram this morning   shows changes of early repolarization, a normal variant.  It does not look like   changes of pericarditis or injury.    IMPRESSION:  1.  I believe her fainting spells are probably related to low intravascular   fluid volume from the nausea and vomiting, in the setting of hyperemesis.  2.  Burning in the chest, does not sound cardiogenic.  3.  The electrocardiographic changes are nonspecific, and I believe represent a   repolarization variant.  4.  Mild troponin elevation trending downwards, is probably the result of a   demand ischemia.    I do not believe any of these warrants any cardiac intervention.  I will review   the echocardiogram once it is done, it is okay to transfer her out of the   Intensive Care Unit.  I will follow along with you.    Thank you for the opportunity of seeing Ms. Purvis in consultation.      KASIA  dd: 05/18/2018 10:37:45 (CDT)  td: 05/18/2018 21:13:08 (CDT)  Doc ID   #[id number]  Job ID #[id number]    CC:

## 2018-05-18 NOTE — PROGRESS NOTES
Called 2/2 pt with elevated troponins, lateral EKG changes, chest pain. Went to bedside with OB team. Code STEMI called, O2 administered, ASA 325mg & SL nitroglycerin given, cardiology consulted. Pt HD stable, plan to transfer to ICU, further recs per cardiology.

## 2018-05-18 NOTE — PROGRESS NOTES
Spoke with cardiology, discussed EKG findings. STEMI is no longer visible on the EKG however non-specific T-wave changes present however no immediate cardiology interventions needed.  Troponins have not increased and there is low concern for a developing MI.      Will continue to trend troponins, EKG/Echo in the AM.

## 2018-05-18 NOTE — ASSESSMENT & PLAN NOTE
Malnutrition in the context of Acute Illness/Injury    Related to (etiology):  Hyperemesis gravidarum    Signs and Symptoms (as evidenced by):  Wt loss of 17% x 6 months, inability to tolerate PO intake PTA (intake of <50% estimated energy requirements >5 days)    Interventions/Recommendations (treatment strategy):  1. Consider adding Boost Breeze tid while on clear liquid diet 2. If/when medically feasible, advance diet to full liquid -> GI soft 3. Continue to replete electrolytes prn 4. Provided N/V diet education 5. If diet unable to be advanced within 72h, recommend to initiate enteral nutrition. See RD note 5/18/2018 for full recs    Nutrition Diagnosis Status:  New

## 2018-05-18 NOTE — PLAN OF CARE
Problem: Patient Care Overview  Goal: Plan of Care Review  Patient resting quietly. Report given to night nurse. Hourly shift rounds performed. Vital signs stable. Patient remained afebrile. Fetal heart tones assessed via doppler. Fetal heart tones 151. No complaints of pain noted. Patient tolerated clear liquid diet. TEDS and SCDS on bilateral lower extremities.  No apparent distress noted. Call light within reach.  No falls or injuries noted.

## 2018-05-18 NOTE — CONSULTS
[facility]  Adult Nutrition  Consult Note     SUMMARY       Recommendations    Recommendation/Intervention:   1. Consider adding Boost Breeze tid while on clear liquid diet   2. If/when medically feasible, advance diet to full liquid -> GI soft   3. Continue to replete electrolytes prn   4. Provided N/V diet education   5. If diet unable to be advanced within 72h, recommend to initiate enteral nutrition. Recommend Nutren 1.5 @ goal rate of 60ml/hr. Initiate @ 10ml/hr, increase by 10ml/hr q8h until goal. Provides 2160 kcal (107% EEN), 98gm protein (100% EPN), 1094ml free water, and 144% RDIs.     Goals:   1. Meet >85% EEN and EPN within 72h   2. Prevent further wt loss/promote weight gain   3. Promote nutrition related labs wnl    Nutrition Goal Status: new  Communication of RD Recs: other (comment) (POC)    Comments: Pt at 17w4d with hyperemesis gravidarum, states she was unable to tolerate PO intake for last 2 weeks. Endorses recent 60lb wt loss. Per chart, pt lost 17% x 6 months. Pt states she hasn't vomited since yesterday and is tolerating clear liquids, but is still nauseous. Provided education on obtaining adequate nutrition when experiencing nausea/vomiting during pregnancy.    Reason for Assessment    Reason for Assessment: consult    Diagnosis:   1. Hyperemesis gravidarum with dehydration    2. Tachycardia    3. Hyponatremia    4. Hypokalemia    5. Elevated liver enzymes    6. Elevated troponin    7. Syncope    8. Hyperemesis gravidarum    9. Chest pain    10. STEMI (ST elevation myocardial infarction)    11. Myocardial infarction      History reviewed. No pertinent past medical history.     Nutrition Discharge Planning: pending medical course    Nutrition Risk Screen    Nutrition Risk Screen: no indicators present    Nutrition/Diet History    Do you have any cultural, spiritual, Muslim conflicts, given your current situation?: none  Factors Affecting Nutritional Intake:  "nausea/vomiting    Anthropometrics    Temp: 98.4 °F (36.9 °C)  Height Method: Stated  Height: 5' 1" (154.9 cm)  Height (inches): 61 in  Weight Method: Bed Scale  Weight: 67.7 kg (149 lb 4 oz)  Weight (lb): 149.25 lb  Ideal Body Weight (IBW), Female: 105 lb  % Ideal Body Weight, Female (lb): 142.14 lb  BMI (Calculated): 28.3    Lab/Procedures/Meds    Labs: Reviewed  Lab Results   Component Value Date     (L) 05/18/2018    K 4.1 05/18/2018     (H) 05/18/2018    CO2 14 (L) 05/18/2018    BUN 4 (L) 05/18/2018    CREATININE 0.5 05/18/2018    CALCIUM 7.6 (L) 05/18/2018    PHOS <1.0 (LL) 05/18/2018    MG 1.6 05/18/2018    ESTGFRAFRICA >60 05/18/2018    EGFRNONAA >60 05/18/2018    ALBUMIN 2.7 (L) 05/18/2018   *Corrected Ca 8.64    Lab Results   Component Value Date    ALT 72 (H) 05/18/2018    AST 39 05/18/2018    ALKPHOS 30 (L) 05/18/2018     Lab Results   Component Value Date    HCT 26.3 (L) 05/18/2018     Lab Results   Component Value Date    HGB 9.5 (L) 05/18/2018     Meds: Reviewed  Scheduled Meds:   enoxaparin  40 mg Subcutaneous Daily    lidocaine (PF) 10 mg/ml (1%)  5 mL Other Once    magnesium sulfate IVPB  2 g Intravenous Once    nitrofurantoin (macrocrystal-monohydrate)  100 mg Oral Q12H    ondansetron  4 mg Oral Q6H    pantoprazole 40 mg in dextrose 5 % 100 mL IVPB (over 15 minutes) (ready to mix system)  40 mg Intravenous Q12H    prenatal vitamin  1 tablet Oral Daily    promethazine (PHENERGAN) IVPB  25 mg Intravenous Q6H    sodium phosphate IVPB  30 mmol Intravenous Once    sucralfate  1 g Oral QID (AC & HS)    thiamine  100 mg Oral Daily     Continuous Infusions:   sodium chloride 0.9% 125 mL/hr at 05/18/18 0733     Physical Findings/Assessment    Overall Physical Appearance: weak  Oral/Mouth Cavity: WDL  Skin: intact    Estimated/Assessed Needs    Weight Used For Calorie Calculations: 67.7 kg (149 lb 4 oz)  Energy Calorie Requirements (kcal): 2020  Energy Need Method: Liliana-[place] " (stress factor 1.5)  Protein Requirements:  gm/d (1.2-1.5 gm/kg)  Weight Used For Protein Calculations: 67.7 kg (149 lb 4 oz)  Fluid Requirements (mL): 2020 (or per team)  Fluid Need Method: RDA Method    Nutrition Prescription Ordered    Current Diet Order: clear liquid    Evaluation of Received Nutrient/Fluid Intake in last 24h    Intake/Output Summary (Last 24 hours) at 05/18/18 1432  Last data filed at 05/18/18 1030   Gross per 24 hour   Intake             1790 ml   Output             1925 ml   Net             -135 ml     % Intake of Estimated Energy Needs: 0 - 25 %  % Meal Intake: 0 - 25 %    Nutrition Risk    Level of Risk/Frequency of Follow-up: high     Assessment and Plan    Severe malnutrition    Malnutrition in the context of Acute Illness/Injury    Related to (etiology):  Hyperemesis gravidarum    Signs and Symptoms (as evidenced by):  Wt loss of 17% x 6 months, inability to tolerate PO intake PTA (intake of <50% estimated energy requirements >5 days)    Interventions/Recommendations (treatment strategy):  1. Consider adding Boost Breeze tid while on clear liquid diet 2. If/when medically feasible, advance diet to full liquid -> GI soft 3. Continue to replete electrolytes prn 4. Provided N/V diet education 5. If diet unable to be advanced within 72h, recommend to initiate enteral nutrition. See RD note 5/18/2018 for full recs    Nutrition Diagnosis Status:  New          Monitor and Evaluation    Food and Nutrient Intake: energy intake, food and beverage intake, enteral nutrition intake  Food and Nutrient Adminstration: diet order, enteral and parenteral nutrition administration  Knowledge/Beliefs/Attitudes: food and nutrition knowledge/skill, beliefs and attitudes  Physical Activity and Function: nutrition-related ADLs and IADLs  Anthropometric Measurements: weight, weight change  Biochemical Data, Medical Tests and Procedures: electrolyte and renal panel, gastrointestinal profile,  glucose/endocrine profile, inflammatory profile, lipid profile  Nutrition-Focused Physical Findings: overall appearance, extremities, muscles and bones, skin     Nutrition Follow-Up    RD Follow-up?: Yes    Maria Teresa Hernandez, MS, RD, LDN   Dietitian, [facility]  [phone]

## 2018-05-18 NOTE — PROGRESS NOTES
[facility]  Obstetrics  Antepartum Progress Note    Patient Name: Daija Purvis  MRN: [id number]  Admission Date: 2018  Hospital Length of Stay: 1 days  Attending Physician: Refugio Reed MD  Primary Care Provider: Lizet Abreu MD    Subjective:     Principal Problem:Hyperemesis gravidarum    HPI:  Daija Purvis is a 27 y.o. F at 17w4d who presents complaining of persistent nausea and vomiting with repetitive syncopal episodes. She lost consciousness in the hospital lobby when she presented today. She was coming to the hospital because of her vomiting. She has been unable to keep solids down for the past two weeks, but liquids have been staying down until the past two days. She has been taking Diclegis and phenergan at home. She reports streaks of blood in her vomit for 2 weeks, but complains of increased amounts of blood today. Patient reports having regular syncopal episodes during this pregnancy related to weakness. She has at least one episode of feeling faint per day, but does not always lose consciousness.     She also complains of abdominal pain after falling. Reports she fell directly onto her abdomen from standing. The pain is sharp, cramping, and diffuse. She denies vaginal bleeding.     Hospital Course:  2018 - Admitted for fluid resuscitation and management of hyperemesis. Later in evening elevated troponins and abnormal EKG suggestive of STEMI noted, aspiring and sublingual nitroglycerin given, cardiology and EICU consulted, patient transferred to ICU for monitoring  2018 - Doing well this morning. Nausea improved, tolerating clears. CTA negative for acute processes. Continue monitoring in ICU. Troponin trending down.     Obstetric HPI:  Patient is stable this morning. She is tolerating liquids without nausea or vomiting. Denies chest pain and shortness of breath. Denies abdominal pain or vaginal bleeding.      Objective:     Vital Signs (Most  Recent):  Temp: 98.6 °F (37 °C) (05/18/18 0301)  Pulse: 84 (05/18/18 0601)  Resp: (!) 23 (05/18/18 0601)  BP: (!) 86/64 (05/18/18 0501)  SpO2: 100 % (05/18/18 0601) Vital Signs (24h Range):  Temp:  [97.2 °F (36.2 °C)-98.6 °F (37 °C)] 98.6 °F (37 °C)  Pulse:  [] 84  Resp:  [11-24] 23  SpO2:  [89 %-100 %] 100 %  BP: ()/(55-92) 86/64     Weight: 67.7 kg (149 lb 4 oz)  Body mass index is 28.2 kg/m².      Intake/Output Summary (Last 24 hours) at 05/18/18 0702  Last data filed at 05/18/18 0240   Gross per 24 hour   Intake              850 ml   Output              850 ml   Net                0 ml       Significant Labs:  Recent Lab Results       05/18/18  0616 05/17/18  2359 05/17/18  1930 05/17/18  1634 05/17/18  1052      Alcohol, Urine   <10       Benzodiazepines   Negative       Methadone metabolites   Negative       Phencyclidine   Negative       Albumin  3.3(L)        Alkaline Phosphatase  40(L)        ALT  104(H)        Amphetamine Screen, Ur   Negative       Anion Gap  6(L)        Appearance, UA     Clear     AST  52(H)        Bacteria, UA     Moderate(A)     Barbiturate Screen, Ur   Negative       Baso #          Basophil%          Bilirubin (UA)     1+  Comment:  Positive urine bilirubin is not confirmed. Correlate with   serum bilirubin and clinical presentation.  (A)     Total Bilirubin  0.7  Comment:  For infants and newborns, interpretation of results should be based  on gestational age, weight and in agreement with clinical  observations.  Premature Infant recommended reference ranges:  Up to 24 hours.............<8.0 mg/dL  Up to 48 hours............<12.0 mg/dL  3-5 days..................<15.0 mg/dL  6-29 days.................<15.0 mg/dL          BUN, Bld  7        Calcium  8.6(L)        Chloride  109        CO2  15(L)        Cocaine (Metab.)   Negative       Color, UA     Yellow     CPK          Creatinine  0.7        Creatinine, Random Ur   88.6  Comment:  The random urine reference ranges  provided were established   for 24 hour urine collections.  No reference ranges exist for  random urine specimens.  Correlate clinically.         Differential Method          eGFR if   >60        eGFR if non   >60  Comment:  Calculation used to obtain the estimated glomerular filtration  rate (eGFR) is the CKD-EPI equation.           Eos #          Eosinophil%          Large/Giant Platelets          Glucose  98        Glucose, UA     Negative     Gran # (ANC)          Gran%          Granular Casts, UA     2(A)     Hematocrit          Hemoglobin          Hyaline Casts, UA     25(A)     Coumadin Monitoring INR          Ketones, UA     3+(A)     Leukocytes, UA     1+(A)     Lymph #          Lymph%          MCH          MCHC          MCV          Microscopic Comment     SEE COMMENT  Comment:  Other formed elements not mentioned in the report are not   present in the microscopic examination.        Mono #          Mono%          MPV          Nitrite, UA     Negative     Occult Blood UA     Trace(A)     Opiate Scrn, Ur   Negative       pH, UA     6.0     Platelet Estimate          Platelets          Potassium  3.0(L)        Total Protein  6.2        Protein, UA     2+  Comment:  Recommend a 24 hour urine protein or a urine   protein/creatinine ratio if globulin induced proteinuria is  clinically suspected.  (A)     Protime          RBC          RBC, UA     3     RDW          Sodium  130(L)        Specific Gravity, UA     >=1.030(A)     Specimen UA     Urine, Clean Catch     Marijuana (THC) Metabolite   Presumptive Positive       Toxicology Information   SEE COMMENT  Comment:  This screen includes the following classes of drugs at the   listed cut-off:  Benzodiazepines                  200 ng/ml  Methadone                        300 ng/ml  Cocaine metabolite               300 ng/ml  Opiates                          300 ng/ml  Barbiturates                     200 ng/ml  Amphetamines                     1000 ng/ml  Marijuana metabs (THC)            50 ng/ml  Phencyclidine (PCP)               25 ng/ml  High concentrations of Diphenhydramine may cross-react with  Phencyclidine PCP screening immunoassay giving a false   positive result.  High concentrations of Methylenedioxymethamphetamine (MDMA aka  Ectasy) and other structurally similar compounds may cross-   react with the Amphetamine/Methamphetamine screening   immunoassay giving a false positive result.  A metabolite of the anti-HIV drug Sustiva () may cause  false positive results in the Marijuana metabolite (THC)   screening assay.  Note: This exception list includes only more common   interferants in toxicology screen testing.  Because of many   cross-reactantspositive results on toxicology drug screens   should be confirmed whenever results do not correlate with   clinical presentation.  This report is intended for use in clinical monitoring and  management of patients. It is not intended for use in   employment related drug testing.  Because of any cross-reactants, positive results on toxicology  drug screens should be confirmed whenever results do not  correlate with clinical presentation.  Presumptive positive results are unconfirmed and may be used   only for medical purposes.         Trichomonas, UA     Few(A)     Troponin I 0.050  Comment:  The reference interval for Troponin I represents the 99th percentile   cutoff   for our facility and is consistent with 3rd generation assay   performance.  (H) 0.050  Comment:  The reference interval for Troponin I represents the 99th percentile   cutoff   for our facility and is consistent with 3rd generation assay   performance.  (H)  0.058  Comment:  The reference interval for Troponin I represents the 99th percentile   cutoff   for our facility and is consistent with 3rd generation assay   performance.  (H)        0.049  Comment:  The reference interval for Troponin I represents the 99th  percentile   cutoff   for our facility and is consistent with 3rd generation assay   performance.  (H)        Urobilinogen, UA     Negative     WBC, UA     30(H)     WBC                      05/17/18  0953 05/17/18  0940      Alcohol, Urine       Benzodiazepines       Methadone metabolites       Phencyclidine       Albumin  4.7     Alkaline Phosphatase  61     ALT  162(H)     Amphetamine Screen, Ur       Anion Gap  23(H)     Appearance, UA       AST  86(H)     Bacteria, UA       Barbiturate Screen, Ur       Baso #  0.00     Basophil%  0.0     Bilirubin (UA)       Total Bilirubin  0.8  Comment:  For infants and newborns, interpretation of results should be based  on gestational age, weight and in agreement with clinical  observations.  Premature Infant recommended reference ranges:  Up to 24 hours.............<8.0 mg/dL  Up to 48 hours............<12.0 mg/dL  3-5 days..................<15.0 mg/dL  6-29 days.................<15.0 mg/dL       BUN, Bld  13     Calcium  11.4(H)     Chloride  99     CO2  10(L)     Cocaine (Metab.)       Color, UA       CPK  29     Creatinine  0.9     Creatinine, Random Ur       Differential Method  Automated     eGFR if   >60     eGFR if non   >60  Comment:  Calculation used to obtain the estimated glomerular filtration  rate (eGFR) is the CKD-EPI equation.        Eos #  0.0     Eosinophil%  0.0     Large/Giant Platelets  Present     Glucose  93     Glucose, UA       Gran # (ANC)  6.5     Gran%  72.9     Granular Casts, UA       Hematocrit  42.2     Hemoglobin  15.6     Hyaline Casts, UA       Coumadin Monitoring INR  1.0  Comment:  Coumadin Therapy:  2.0 - 3.0 for INR for all indicators except mechanical heart valves  and antiphospholipid syndromes which should use 2.5 - 3.5.       Ketones, UA       Leukocytes, UA       Lymph #  1.6     Lymph%  18.1     MCH  31.4(H)     MCHC  37.0(H)     MCV  85     Microscopic Comment       Mono #  0.8     Mono%  8.7      MPV  12.5     Nitrite, UA       Occult Blood UA       Opiate Scrn, Ur       pH, UA       Platelet Estimate  Appears normal     Platelets  281     Potassium  3.1(L)     Total Protein  9.4(H)     Protein, UA       Protime  10.8     RBC  4.97     RBC, UA       RDW  13.3     Sodium  132(L)     Specific [place], UA       Specimen UA       Marijuana (THC) Metabolite       Toxicology Information       Trichomonas, UA       Troponin I 0.030  Comment:  The reference interval for Troponin I represents the 99th percentile   cutoff   for our facility and is consistent with 3rd generation assay   performance.  (H)      Urobilinogen, UA       WBC, UA       WBC  8.94           Physical Exam:   Constitutional: She is oriented to person, place, and time. She appears well-developed and well-nourished. No distress.    HENT:   Head: Atraumatic.    Eyes: EOM are normal. Pupils are equal, round, and reactive to light.    Neck: Normal range of motion. Neck supple.    Cardiovascular: Normal rate and regular rhythm.     Pulmonary/Chest: Effort normal. No respiratory distress.        Abdominal: Soft. She exhibits no distension and no abdominal incision. There is no tenderness.             Musculoskeletal: Normal range of motion. She exhibits no edema or tenderness.       Neurological: She is alert and oriented to person, place, and time.    Skin: Skin is warm and dry. She is not diaphoretic.        CTA: Impression     Technically limited examination secondary to suboptimal opacification of the pulmonary arterial vasculature.  No evidence of large central pulmonary embolus or convincing evidence of pulmonary thromboembolism to the level of the segmental arteries or pulmonary infarction. Pulmonary embolus distal to the segmental levels, particularly at the lung bases cannot be excluded. Correlation with d-dimer and lower extremity venous Doppler ultrasound as clinically warranted.         Assessment/Plan:     27 y.o. female  at 17w5d  for:    * Hyperemesis gravidarum    - Continue fluid resuscitation and antiemetics  - S/p Banana bag  - Stagger IV phenergan and zofran q6 hours. Give IV until patient can tolerate PO medications  - Blood-streaked emesis likely caused by esophageal irritation. Will give IV protonix and PO sucralfate  - Daily CMP. Low potassium on admit. Will replace IV until patient tolerates PO.   - Clear liquid diet as tolerated  - Check FHT daily        Acute cystitis without hematuria    - Macrobid        STEMI (ST elevation myocardial infarction)    - Troponin elevated to 0.056. Trending down to 0.048  - EKG overnight indicative of STEMI. Cardiology consulted   - Nitroglycerin and aspirin administered   - Cards following  - CTA negative  - Continuous telemetry  - Continue to monitor in ICU                Juan Ruth MD  Obstetrics  [facility]

## 2018-05-18 NOTE — ASSESSMENT & PLAN NOTE
- Troponin elevated to 0.056. Trending down to 0.048  - EKG overnight indicative of STEMI. Cardiology consulted   - Nitroglycerin and aspirin administered   - Cards following  - CTA negative  - Continuous telemetry  - Continue to monitor in ICU

## 2018-05-18 NOTE — PROGRESS NOTES
MD to bedside to evaluate patient. Patient states she has mild chest pain, denies SOB. Vitals stable, blood pressure in the 120s, tachycardic in the 90s, oxygen [id number]% on room air.    Troponi's increasing from 0.030 to 0.058. EKG ordered, results reveal septal infarct, lateral injury pattern, acute MI/STEMI.    Anesthesia reviewed ECG, agree with findings. Code STEMI called, house supervisor present, cardiology contacted. O2 applied via face mask. Aspirin 325 mcg and sublingual nitroglycerin given    Discussed clinical findings with cardiology who reviewed ECG and clinical picture.  Recommendations: transport to ICU, place on continuous telemetry, serial troponin's (q6h), aspirin qdaily, repeat EKG in AM, ECHO in AM, atorvastatin if needed    Discussed case with E-ICU and on-call ICU fellow who will be available as needed.    Will continue to monitor patient closely through evening and AM. Will re consult cardiology/E-ICU as needed.    No immediate obstetric concerns.      Melissa Abdi MD   PGY-3 Ob-gyn

## 2018-05-18 NOTE — PROGRESS NOTES
Phos < 1.0  Replete with 30 mmol sodium phosphate. At risk of refeeding syndrome.  Replete Mg due to recent cardiac event.

## 2018-05-18 NOTE — PLAN OF CARE
Problem: Nutrition, Imbalanced: Inadequate Oral Intake (Adult)  Intervention: Promote/Optimize Nutrition  Recommendation/Intervention:   1. Consider adding Boost Breeze tid while on clear liquid diet   2. If/when medically feasible, advance diet to full liquid -> GI soft   3. Continue to replete electrolytes prn   4. Provided N/V diet education   5. If diet unable to be advanced within 72h, recommend to initiate enteral nutrition. Recommend Nutren 1.5 @ goal rate of 60ml/hr. Initiate @ 10ml/hr, increase by 10ml/hr q8h until goal. Provides 2160 kcal (107% EEN), 98gm protein (100% EPN), 1094ml free water, and 144% RDIs.      Goals:   1. Meet >85% EEN and EPN within 72h   2. Prevent further wt loss/promote weight gain   3. Promote nutrition related labs wnl

## 2018-05-19 LAB
25(OH)D3+25(OH)D2 SERPL-MCNC: 6 NG/ML
ABO + RH BLD: NORMAL
ALBUMIN SERPL BCP-MCNC: 2.9 G/DL
ALP SERPL-CCNC: 39 U/L
ALT SERPL W/O P-5'-P-CCNC: 67 U/L
ANION GAP SERPL CALC-SCNC: 7 MMOL/L
AST SERPL-CCNC: 34 U/L
BACTERIA UR CULT: NORMAL
BACTERIA UR CULT: NORMAL
BASOPHILS # BLD AUTO: 0 K/UL
BASOPHILS NFR BLD: 0 %
BILIRUB SERPL-MCNC: 0.4 MG/DL
BLD GP AB SCN CELLS X3 SERPL QL: NORMAL
BUN SERPL-MCNC: 2 MG/DL
CALCIUM SERPL-MCNC: 8.4 MG/DL
CHLORIDE SERPL-SCNC: 115 MMOL/L
CO2 SERPL-SCNC: 17 MMOL/L
CREAT SERPL-MCNC: 0.7 MG/DL
DIASTOLIC DYSFUNCTION: NO
DIFFERENTIAL METHOD: ABNORMAL
EOSINOPHIL # BLD AUTO: 0 K/UL
EOSINOPHIL NFR BLD: 0.7 %
ERYTHROCYTE [DISTWIDTH] IN BLOOD BY AUTOMATED COUNT: 13.9 %
EST. GFR  (AFRICAN AMERICAN): >60 ML/MIN/1.73 M^2
EST. GFR  (NON AFRICAN AMERICAN): >60 ML/MIN/1.73 M^2
ESTIMATED PA SYSTOLIC PRESSURE: 12.39
GLUCOSE SERPL-MCNC: 116 MG/DL
HCT VFR BLD AUTO: 27 %
HGB BLD-MCNC: 9.8 G/DL
LYMPHOCYTES # BLD AUTO: 2.2 K/UL
LYMPHOCYTES NFR BLD: 35.9 %
MAGNESIUM SERPL-MCNC: 2.2 MG/DL
MCH RBC QN AUTO: 31.6 PG
MCHC RBC AUTO-ENTMCNC: 36.3 G/DL
MCV RBC AUTO: 87 FL
MITRAL VALVE REGURGITATION: NORMAL
MONOCYTES # BLD AUTO: 0.5 K/UL
MONOCYTES NFR BLD: 8.6 %
NEUTROPHILS # BLD AUTO: 3.4 K/UL
NEUTROPHILS NFR BLD: 54.6 %
PHOSPHATE SERPL-MCNC: 1.3 MG/DL
PHOSPHATE SERPL-MCNC: 1.9 MG/DL
PHOSPHATE SERPL-MCNC: <1 MG/DL
PLATELET # BLD AUTO: 176 K/UL
PMV BLD AUTO: 11.9 FL
POTASSIUM SERPL-SCNC: 3.8 MMOL/L
PROT SERPL-MCNC: 5.6 G/DL
PTH-INTACT SERPL-MCNC: 16.4 PG/ML
RBC # BLD AUTO: 3.1 M/UL
RETIRED EF AND QEF - SEE NOTES: 55 (ref 55–65)
SODIUM SERPL-SCNC: 139 MMOL/L
TRICUSPID VALVE REGURGITATION: NORMAL
WBC # BLD AUTO: 6.15 K/UL

## 2018-05-19 PROCEDURE — 63600175 PHARM REV CODE 636 W HCPCS: Performed by: INTERNAL MEDICINE

## 2018-05-19 PROCEDURE — C9113 INJ PANTOPRAZOLE SODIUM, VIA: HCPCS | Performed by: INTERNAL MEDICINE

## 2018-05-19 PROCEDURE — 80053 COMPREHEN METABOLIC PANEL: CPT

## 2018-05-19 PROCEDURE — 25000003 PHARM REV CODE 250: Performed by: STUDENT IN AN ORGANIZED HEALTH CARE EDUCATION/TRAINING PROGRAM

## 2018-05-19 PROCEDURE — 63600175 PHARM REV CODE 636 W HCPCS: Performed by: OBSTETRICS & GYNECOLOGY

## 2018-05-19 PROCEDURE — 85025 COMPLETE CBC W/AUTO DIFF WBC: CPT

## 2018-05-19 PROCEDURE — 99232 SBSQ HOSP IP/OBS MODERATE 35: CPT | Mod: ,,, | Performed by: OBSTETRICS & GYNECOLOGY

## 2018-05-19 PROCEDURE — 25000003 PHARM REV CODE 250: Performed by: OBSTETRICS & GYNECOLOGY

## 2018-05-19 PROCEDURE — 25000003 PHARM REV CODE 250: Performed by: INTERNAL MEDICINE

## 2018-05-19 PROCEDURE — 82306 VITAMIN D 25 HYDROXY: CPT

## 2018-05-19 PROCEDURE — 84100 ASSAY OF PHOSPHORUS: CPT | Mod: 91

## 2018-05-19 PROCEDURE — 99232 SBSQ HOSP IP/OBS MODERATE 35: CPT | Mod: ,,, | Performed by: INTERNAL MEDICINE

## 2018-05-19 PROCEDURE — 11000001 HC ACUTE MED/SURG PRIVATE ROOM

## 2018-05-19 PROCEDURE — 63600175 PHARM REV CODE 636 W HCPCS: Performed by: STUDENT IN AN ORGANIZED HEALTH CARE EDUCATION/TRAINING PROGRAM

## 2018-05-19 PROCEDURE — 94761 N-INVAS EAR/PLS OXIMETRY MLT: CPT

## 2018-05-19 PROCEDURE — 83970 ASSAY OF PARATHORMONE: CPT

## 2018-05-19 PROCEDURE — 83735 ASSAY OF MAGNESIUM: CPT

## 2018-05-19 PROCEDURE — 36415 COLL VENOUS BLD VENIPUNCTURE: CPT

## 2018-05-19 PROCEDURE — 84100 ASSAY OF PHOSPHORUS: CPT

## 2018-05-19 PROCEDURE — 86850 RBC ANTIBODY SCREEN: CPT

## 2018-05-19 RX ORDER — SODIUM,POTASSIUM PHOSPHATES 280-250MG
2 POWDER IN PACKET (EA) ORAL ONCE
Status: COMPLETED | OUTPATIENT
Start: 2018-05-19 | End: 2018-05-19

## 2018-05-19 RX ORDER — PROMETHAZINE HYDROCHLORIDE 25 MG/1
25 TABLET ORAL EVERY 6 HOURS
Status: DISCONTINUED | OUTPATIENT
Start: 2018-05-20 | End: 2018-05-21

## 2018-05-19 RX ORDER — ERGOCALCIFEROL 1.25 MG/1
50000 CAPSULE ORAL
Status: DISCONTINUED | OUTPATIENT
Start: 2018-05-20 | End: 2018-05-21 | Stop reason: HOSPADM

## 2018-05-19 RX ORDER — SODIUM,POTASSIUM PHOSPHATES 280-250MG
2 POWDER IN PACKET (EA) ORAL
Status: COMPLETED | OUTPATIENT
Start: 2018-05-19 | End: 2018-05-19

## 2018-05-19 RX ADMIN — STANDARDIZED SENNA CONCENTRATE AND DOCUSATE SODIUM 1 TABLET: 8.6; 5 TABLET, FILM COATED ORAL at 03:05

## 2018-05-19 RX ADMIN — DIPHENHYDRAMINE HYDROCHLORIDE 25 MG: 50 INJECTION, SOLUTION INTRAMUSCULAR; INTRAVENOUS at 03:05

## 2018-05-19 RX ADMIN — ACETAMINOPHEN 650 MG: 325 TABLET, FILM COATED ORAL at 03:05

## 2018-05-19 RX ADMIN — ENOXAPARIN SODIUM 40 MG: 100 INJECTION SUBCUTANEOUS at 05:05

## 2018-05-19 RX ADMIN — NITROFURANTOIN (MONOHYDRATE/MACROCRYSTALS) 100 MG: 75; 25 CAPSULE ORAL at 08:05

## 2018-05-19 RX ADMIN — POTASSIUM & SODIUM PHOSPHATES POWDER PACK 280-160-250 MG 2 PACKET: 280-160-250 PACK at 03:05

## 2018-05-19 RX ADMIN — PRENATAL VIT W/ FE FUMARATE-FA TAB 27-0.8 MG 1 TABLET: 27-0.8 TAB at 08:05

## 2018-05-19 RX ADMIN — POTASSIUM PHOSPHATE, MONOBASIC AND POTASSIUM PHOSPHATE, DIBASIC 30 MMOL: 224; 236 INJECTION, SOLUTION, CONCENTRATE INTRAVENOUS at 10:05

## 2018-05-19 RX ADMIN — SUCRALFATE 1 G: 1 TABLET ORAL at 06:05

## 2018-05-19 RX ADMIN — PROMETHAZINE HYDROCHLORIDE 25 MG: 25 INJECTION INTRAMUSCULAR; INTRAVENOUS at 05:05

## 2018-05-19 RX ADMIN — ONDANSETRON 4 MG: 4 TABLET, ORALLY DISINTEGRATING ORAL at 05:05

## 2018-05-19 RX ADMIN — SUCRALFATE 1 G: 1 TABLET ORAL at 12:05

## 2018-05-19 RX ADMIN — SUCRALFATE 1 G: 1 TABLET ORAL at 09:05

## 2018-05-19 RX ADMIN — POTASSIUM & SODIUM PHOSPHATES POWDER PACK 280-160-250 MG 2 PACKET: 280-160-250 PACK at 05:05

## 2018-05-19 RX ADMIN — POTASSIUM & SODIUM PHOSPHATES POWDER PACK 280-160-250 MG 2 PACKET: 280-160-250 PACK at 12:05

## 2018-05-19 RX ADMIN — Medication 100 MG: at 08:05

## 2018-05-19 RX ADMIN — POTASSIUM & SODIUM PHOSPHATES POWDER PACK 280-160-250 MG 2 PACKET: 280-160-250 PACK at 08:05

## 2018-05-19 RX ADMIN — PROMETHAZINE HYDROCHLORIDE 25 MG: 25 INJECTION INTRAMUSCULAR; INTRAVENOUS at 11:05

## 2018-05-19 RX ADMIN — ONDANSETRON 4 MG: 4 TABLET, ORALLY DISINTEGRATING ORAL at 11:05

## 2018-05-19 RX ADMIN — SUCRALFATE 1 G: 1 TABLET ORAL at 07:05

## 2018-05-19 RX ADMIN — PANTOPRAZOLE SODIUM 40 MG: 40 INJECTION, POWDER, FOR SOLUTION INTRAVENOUS at 08:05

## 2018-05-19 RX ADMIN — DEXTROSE: 5 SOLUTION INTRAVENOUS at 11:05

## 2018-05-19 NOTE — ASSESSMENT & PLAN NOTE
- EKG overnight indicative of STEMI. Cardiology consulted   - Nitroglycerin and aspirin administered   - Cards following. Attribute EKG changes to electrolyte abnormalities and not MI  - CTA negative  - Continuous telemetry  - Continue to monitor in ICU until lytes are normalized

## 2018-05-19 NOTE — CONSULTS
Pulmonary / Critical Care Medicine  Consult Note    Primary Attending:  Refugio Reed MD   Primary Team: Obstetrics   Consultant Attending: Thom Wood MD   Consultant Fellow: Jack Doty MD     Reason for Consult  NSTEMI     History of Present Illness:  Ms. Purvis is a 27 year G1PO @ 17 wga, with refractory hyperemesis, who presented to the emergency department yesterday afternoon after multiple syncopal episodes.  She reports that she has had intractable nausea and vomiting near constantly throughout her pregnancy and has not been able to keep down any food/liquids.  She reports that recently she noticed some blood in her vomit, but denies any associated melena.  In the emergency department she was found to be profoundly volume deplete and was admitted to the Obstetrics service.  In the emergency department she reported some chest pain associated with episodes of vomiting.  Cardiac biomarkers were collected and found to be slightly elevated.  An automatic interpretation of her EKG was reported as STEMI and she was subsequently transferred to the ICU with a cardiology consult.  She is currently chest pain free and has been since admission.     Past Medical History:  History reviewed. No pertinent past medical history.     Past Surgical History:  History reviewed. No pertinent surgical history.     Allergies:   Codeine         Medications:   Home Medications:  No current facility-administered medications on file prior to encounter.      Current Outpatient Prescriptions on File Prior to Encounter   Medication Sig Dispense Refill    doxylamine succinate 25 mg tablet Take 1 tablet (25 mg total) by mouth every 6 (six) hours as needed (nausea). 60 tablet 3    famotidine (PEPCID) 20 MG tablet Take 1 tablet (20 mg total) by mouth 2 (two) times daily. 60 tablet 11    ondansetron (ZOFRAN) 4 MG tablet Take 1 tablet (4 mg total) by mouth every 6 (six) hours as needed for Nausea. 15 tablet 0    pyridoxine, vitamin  B6, (B-6) 50 MG Tab Take 1 tablet (50 mg total) by mouth every 6 (six) hours as needed (nausea). 60 tablet 3         Current Medications:  Scheduled:   enoxaparin  40 mg Subcutaneous Daily    lidocaine (PF) 10 mg/ml (1%)  5 mL Other Once    nitrofurantoin (macrocrystal-monohydrate)  100 mg Oral Q12H    ondansetron  4 mg Oral Q6H    pantoprazole 40 mg in dextrose 5 % 100 mL IVPB (over 15 minutes) (ready to mix system)  40 mg Intravenous Q12H    prenatal vitamin  1 tablet Oral Daily    promethazine (PHENERGAN) IVPB  25 mg Intravenous Q6H    sucralfate  1 g Oral QID (AC & HS)    thiamine  100 mg Oral Daily       Continuous Infusions:   sodium chloride 0.9% 125 mL/hr at 05/18/18 1605       PRN:   acetaminophen, diphenhydrAMINE, diphenhydrAMINE, nitroGLYCERIN, senna-docusate 8.6-50 mg, simethicone     Social History:  Tobacco:  reports that she has never smoked. She does not have any smokeless tobacco history on file.   EtOH:  reports that she does not drink alcohol.   Illicit Drugs: History   Drug Use No      Occupation Data Unavailable.       Family History:  Mother: family history is not on file.   Father:      Review of Systems:  · Other than those symptoms mentioned above, an extensive review of systems was unremarkable.     Vital Signs   Temp:  [98.1 °F (36.7 °C)-98.6 °F (37 °C)]   Pulse:  [76-97]   Resp:  [11-24]   BP: ()/(55-81)   SpO2:  [89 %-100 %]    Physical Exam   Gen: well developed, well nourished, no distress   HEENT: dry mucous membranes  conjunctivae/corneas clear. PERRL.   CVS: regular rate and rhythm, S1, S2 normal, no murmur   Chest: clear to auscultation bilaterally, normal respiratory effort and normal percussion bilaterally   Abdomen: soft, non-tender non-distended; bowel sounds normal   Ext: warm, well perfused and no cyanosis or edema, or clubbing   Skin: no rashes, no ecchymoses, no petechiae   Neuro: oriented, normal mood, grossly non-focal   Lines: PIV      Labs   WBC 7.31  --     RBC 3.08*  --    HGB 9.5*  --    HCT 26.3*  --      --    MCV 85  --    MCH 30.8  --    MCHC 36.1*  --    *  --    K 4.1  --    *  --    CO2 14*  --    BUN 4*  --    CREATININE 0.5  --    MG 1.6  --    ALT 72*  --    AST 39  --    ALKPHOS 30*  --    BILITOT 0.6  --    PROT 4.9*  --    ALBUMIN 2.7*  --    TROPONINI 0.042* 0.044*      Imaging CTA Chest 05/18/2018   I personally reviewed the films and findings are:, No evidence of pulmonary embolus; normal lung parynchema      Other Studies:   PFTs      None on file   Echo  None on file      Assessment/Plan:  1. Hyperemesis gravidarum  2. Intractable nausea/vomiting  3. Volume depletion  4. Orthostatic syncope  5. Type 2 NSTEMI  6. Transient hematemesis; possible Yun-Wies tear  7. Asymptomatic bacturia  · Mild troponin elevation from profound volume depletion; no evidence to suggest ACS  · Continued volume resucitation as you are .  · No evidence of pulmonary embolus.  · Nausea seems to be better controlled than prior to admission    Jack Doty MD  Pulmonary / Critical Care Fellow  Cell [phone]  05/18/2018  7:33 PM\

## 2018-05-19 NOTE — SUBJECTIVE & OBJECTIVE
Obstetric HPI:  Patient is stable this morning. Kept porkchops down overnight. Denies chest pain and shortness of breath. Denies abdominal pain or vaginal bleeding.      Objective:     Vital Signs (Most Recent):  Temp: 98 °F (36.7 °C) (05/19/18 0305)  Pulse: 82 (05/19/18 0305)  Resp: 18 (05/19/18 0305)  BP: (!) 83/44 (05/19/18 0305)  SpO2: 97 % (05/19/18 0305) Vital Signs (24h Range):  Temp:  [98 °F (36.7 °C)-98.6 °F (37 °C)] 98 °F (36.7 °C)  Pulse:  [80-96] 82  Resp:  [13-25] 18  SpO2:  [95 %-100 %] 97 %  BP: ()/(44-75) 83/44     Weight: 67.7 kg (149 lb 4 oz)  Body mass index is 28.2 kg/m².      Intake/Output Summary (Last 24 hours) at 05/19/18 0551  Last data filed at 05/19/18 0100   Gross per 24 hour   Intake          4758.75 ml   Output             2925 ml   Net          1833.75 ml       Significant Labs:  Recent Lab Results       05/19/18  0040 05/19/18  0039 05/18/18  1808 05/18/18  1018 05/18/18  0616      Albumin 2.9(L)   2.7(L) 3.3(L)     Alkaline Phosphatase 39(L)   30(L) 41(L)     ALT 67(H)   72(H) 93(H)     Anion Gap 7(L)   5(L) 9     AST 34   39 46(H)     Baso # 0.00   0.01 0.01     Basophil% 0.0   0.1 0.1     Total Bilirubin 0.4  Comment:  For infants and newborns, interpretation of results should be based  on gestational age, weight and in agreement with clinical  observations.  Premature Infant recommended reference ranges:  Up to 24 hours.............<8.0 mg/dL  Up to 48 hours............<12.0 mg/dL  3-5 days..................<15.0 mg/dL  6-29 days.................<15.0 mg/dL     0.6  Comment:  For infants and newborns, interpretation of results should be based  on gestational age, weight and in agreement with clinical  observations.  Premature Infant recommended reference ranges:  Up to 24 hours.............<8.0 mg/dL  Up to 48 hours............<12.0 mg/dL  3-5 days..................<15.0 mg/dL  6-29 days.................<15.0 mg/dL   0.8  Comment:  For infants and newborns, interpretation of  results should be based  on gestational age, weight and in agreement with clinical  observations.  Premature Infant recommended reference ranges:  Up to 24 hours.............<8.0 mg/dL  Up to 48 hours............<12.0 mg/dL  3-5 days..................<15.0 mg/dL  6-29 days.................<15.0 mg/dL       BUN, Bld 2(L)   4(L) 6     Calcium 8.4(L)   7.6(L) 8.9     Chloride 115(H)   115(H) 109     CO2 17(L)   14(L) 14(L)     Creatinine 0.7   0.5 0.6     Differential Method Automated   Automated Automated     eGFR if  >60   >60 >60     eGFR if non  >60  Comment:  Calculation used to obtain the estimated glomerular filtration  rate (eGFR) is the CKD-EPI equation.      >60  Comment:  Calculation used to obtain the estimated glomerular filtration  rate (eGFR) is the CKD-EPI equation.    >60  Comment:  Calculation used to obtain the estimated glomerular filtration  rate (eGFR) is the CKD-EPI equation.        Eos # 0.0   0.0 0.0     Eosinophil% 0.7   0.3 0.3     Glucose 116(H)   91 69(L)     Gran # (ANC) 3.4   4.5 4.5     Gran% 54.6   61.6 57.1     Group & Rh  A POS        Hematocrit 27.0(L)   26.3(L) 31.3(L)     Hemoglobin 9.8(L)   9.5(L) 11.4(L)     INDIRECT JOHANA  NEG        Lactate, Alfonso    1.3  Comment:  Falsely low lactic acid results can be found in samples   containing >=13.0 mg/dL total bilirubin and/or >=3.5 mg/dL   direct bilirubin.        Lymph # 2.2   2.0 2.3     Lymph% 35.9   27.2 29.9     Magnesium 2.2   1.6      MCH 31.6(H)   30.8 31.5(H)     MCHC 36.3(H)   36.1(H) 36.4(H)     MCV 87   85 87     Mono # 0.5   0.8 1.0     Mono% 8.6   10.5 12.3     MPV 11.9   11.3 12.5     Phosphorus <1.0  Comment:  Phosphorus critical result(s) called and verbal readback obtained   from Mihai Epps , 05/19/2018 01:04  (LL)  2.5(L) <1.0  Comment:  Phos   critical result(s) called and verbal readback obtained from   Jenny Brasher RN , 05/18/2018 12:49  (LL)      Platelets 176   170 174      Potassium 3.8  Comment:  Specimen slightly hemolyzed   4.1  Comment:  Specimen slightly hemolyzed 3.4(L)     Total Protein 5.6(L)   4.9(L) 6.1     RBC 3.10(L)   3.08(L) 3.62(L)     RDW 13.9   13.6 13.7     Sodium 139   134(L) 132(L)     Troponin I   0.044  Comment:  The reference interval for Troponin I represents the 99th percentile   cutoff   for our facility and is consistent with 3rd generation assay   performance.  (H) 0.042  Comment:  The reference interval for Troponin I represents the 99th percentile   cutoff   for our facility and is consistent with 3rd generation assay   performance.  (H) 0.050  Comment:  The reference interval for Troponin I represents the 99th percentile   cutoff   for our facility and is consistent with 3rd generation assay   performance.  (H)     WBC 6.15   7.31 7.82                     Physical Exam:   Constitutional: She is oriented to person, place, and time. She appears well-developed and well-nourished. No distress.    HENT:   Head: Atraumatic.    Eyes: EOM are normal. Pupils are equal, round, and reactive to light.    Neck: Normal range of motion. Neck supple.    Cardiovascular: Normal rate and regular rhythm.     Pulmonary/Chest: Effort normal. No respiratory distress.        Abdominal: Soft. She exhibits no distension and no abdominal incision. There is no tenderness.             Musculoskeletal: Normal range of motion. She exhibits no edema or tenderness.       Neurological: She is alert and oriented to person, place, and time.    Skin: Skin is warm and dry. She is not diaphoretic.        CTA: Impression     Technically limited examination secondary to suboptimal opacification of the pulmonary arterial vasculature.  No evidence of large central pulmonary embolus or convincing evidence of pulmonary thromboembolism to the level of the segmental arteries or pulmonary infarction. Pulmonary embolus distal to the segmental levels, particularly at the lung bases cannot be excluded.  Correlation with d-dimer and lower extremity venous Doppler ultrasound as clinically warranted.

## 2018-05-19 NOTE — PROGRESS NOTES
Pulmonary / Critical Care Medicine  Progress Note  S: Feeling better this morning. Kept some solid food down last night, hoping to eat a bit more this morning. Is being transferred out of ICU today. Vitals remained stable overnight.        O: VS: Temp:  [98 °F (36.7 °C)-98.6 °F (37 °C)]   Pulse:  [78-96]   Resp:  [9-25]   BP: ()/(44-75)   SpO2:  [95 %-100 %]     I/O:   Intake/Output Summary (Last 24 hours) at 05/19/18 1047  Last data filed at 05/19/18 1000   Gross per 24 hour   Intake             5180 ml   Output             3600 ml   Net             1580 ml       PE Physical Exam   Constitutional: She is oriented to person, place, and time. She appears well-developed and well-nourished. No distress.   HENT:   Head: Normocephalic and atraumatic.   Mouth/Throat: Oropharynx is clear and moist.   Eyes: No scleral icterus.   Neck: Neck supple.   Cardiovascular: Normal rate, regular rhythm and normal heart sounds.    Pulmonary/Chest: Effort normal and breath sounds normal.   Abdominal: Soft.   Musculoskeletal: She exhibits no edema.   Neurological: She is alert and oriented to person, place, and time.   Skin: Skin is warm and dry.   Psychiatric: She has a normal mood and affect. Her behavior is normal.       Labs   Recent Labs  Lab 05/18/18  1808 05/19/18  0040   WBC  --  6.15   RBC  --  3.10*   HGB  --  9.8*   HCT  --  27.0*   PLT  --  176   MCV  --  87   MCH  --  31.6*   MCHC  --  36.3*   NA  --  139   K  --  3.8   CL  --  115*   CO2  --  17*   BUN  --  2*   CREATININE  --  0.7   MG  --  2.2   ALT  --  67*   AST  --  34   ALKPHOS  --  39*   BILITOT  --  0.4   PROT  --  5.6*   ALBUMIN  --  2.9*   TROPONINI 0.044*  --               Micro   Blood Cultures: n/a  Sputum Culture: n/a      Medications   Scheduled    enoxaparin  40 mg Subcutaneous Daily    lidocaine (PF) 10 mg/ml (1%)  5 mL Other Once    nitrofurantoin (macrocrystal-monohydrate)  100 mg Oral Q12H    ondansetron  4 mg Oral Q6H    pantoprazole 40 mg  in dextrose 5 % 100 mL IVPB (over 15 minutes) (ready to mix system)  40 mg Intravenous Q12H    potassium, sodium phosphates  2 packet Oral Q4H    prenatal vitamin  1 tablet Oral Daily    promethazine (PHENERGAN) IVPB  25 mg Intravenous Q6H    sucralfate  1 g Oral QID (AC & HS)    thiamine  100 mg Oral Daily      Continuous Infusions:    dextrose 5 % 125 mL/hr at 18 1000        PRN   acetaminophen, diphenhydrAMINE, diphenhydrAMINE, nitroGLYCERIN, senna-docusate 8.6-50 mg, simethicone     Enteral Feeds   n/a           A/P:    Pt is 27 y.o. a F at 17w4d who presents complaining of persistent nausea and vomiting with repetitive syncopal episodes    1. Hyperemesis gravidarum - stable  2. Intractable nausea/vomiting - improved  3. Volume depletion - improved  4. Orthostatic syncope - no further episodes  5. Type 2 NSTEMI  6. Transient hematemesis; possible Yun-Wies tear - stable  7. Asymptomatic bacturia - management per primary  8. Anion gap metabolic acidosis. +starvation ketoacidosis - Resolved  9. Hyperchloremic non-anion gap metabolic acidosis - Stable  10. Anemia - stable, likely secondary to dilution    Troponin not rising -- EKG does not show signs of STEMI  Feeling better, agree she is stable for transfer out of ICU  Advance diet as tolerated  Replace electrolytes by mouth as needed.   Limit further NS infusions      DISPO: transfer out of ICU today, we will sign off at this point.      Chidi Awad MD  [facility] Pulmonary / Critical Care Fellow  2018  10:47 AM

## 2018-05-19 NOTE — PLAN OF CARE
Problem: Patient Care Overview  Goal: Plan of Care Review  Outcome: Ongoing (interventions implemented as appropriate)  VSS. No complaints of nausea overnight. PRN pain medication given for back pain. Pt is able to void with a bedside commode. Awaiting room for transfer. Will continue to monitor.

## 2018-05-19 NOTE — PROGRESS NOTES
[facility]  Obstetrics  Antepartum Progress Note    Patient Name: Daija Purvis  MRN: [id number]  Admission Date: 2018  Hospital Length of Stay: 2 days  Attending Physician: Refugio Reed MD  Primary Care Provider: Lizet Aberu MD    Subjective:     Principal Problem:Hyperemesis gravidarum    HPI:  Daija Purvis is a 27 y.o. F at 17w4d who presents complaining of persistent nausea and vomiting with repetitive syncopal episodes. She lost consciousness in the hospital lobby when she presented today. She was coming to the hospital because of her vomiting. She has been unable to keep solids down for the past two weeks, but liquids have been staying down until the past two days. She has been taking Diclegis and phenergan at home. She reports streaks of blood in her vomit for 2 weeks, but complains of increased amounts of blood today. Patient reports having regular syncopal episodes during this pregnancy related to weakness. She has at least one episode of feeling faint per day, but does not always lose consciousness.     She also complains of abdominal pain after falling. Reports she fell directly onto her abdomen from standing. The pain is sharp, cramping, and diffuse. She denies vaginal bleeding.     Hospital Course:  2018 - Admitted for fluid resuscitation and management of hyperemesis. Later in evening elevated troponins and abnormal EKG suggestive of STEMI noted, aspiring and sublingual nitroglycerin given, cardiology and EICU consulted, patient transferred to ICU for monitoring  2018 - Doing well this morning. Nausea improved, tolerating clears. CTA negative for acute processes. Continue monitoring in ICU. Troponin trending down. Cardiology reviewed records, and did not think STEMI occurred. They attributed EKG changes to severe electrolyte deficiencies. Phos repleted overnight - initially to 2.5, however a repeat value was <1. Phos repleted a second time. Remain  in ICU as long as phos is abnormal. Plan to step down today if electrolytes improve.     Obstetric HPI:  Patient is stable this morning. Kept porkchops down overnight. Denies chest pain and shortness of breath. Denies abdominal pain or vaginal bleeding.      Objective:     Vital Signs (Most Recent):  Temp: 98 °F (36.7 °C) (05/19/18 0305)  Pulse: 82 (05/19/18 0305)  Resp: 18 (05/19/18 0305)  BP: (!) 83/44 (05/19/18 0305)  SpO2: 97 % (05/19/18 0305) Vital Signs (24h Range):  Temp:  [98 °F (36.7 °C)-98.6 °F (37 °C)] 98 °F (36.7 °C)  Pulse:  [80-96] 82  Resp:  [13-25] 18  SpO2:  [95 %-100 %] 97 %  BP: ()/(44-75) 83/44     Weight: 67.7 kg (149 lb 4 oz)  Body mass index is 28.2 kg/m².      Intake/Output Summary (Last 24 hours) at 05/19/18 0551  Last data filed at 05/19/18 0100   Gross per 24 hour   Intake          4758.75 ml   Output             2925 ml   Net          1833.75 ml       Significant Labs:  Recent Lab Results       05/19/18  0040 05/19/18  0039 05/18/18  1808 05/18/18  1018 05/18/18  0616      Albumin 2.9(L)   2.7(L) 3.3(L)     Alkaline Phosphatase 39(L)   30(L) 41(L)     ALT 67(H)   72(H) 93(H)     Anion Gap 7(L)   5(L) 9     AST 34   39 46(H)     Baso # 0.00   0.01 0.01     Basophil% 0.0   0.1 0.1     Total Bilirubin 0.4  Comment:  For infants and newborns, interpretation of results should be based  on gestational age, weight and in agreement with clinical  observations.  Premature Infant recommended reference ranges:  Up to 24 hours.............<8.0 mg/dL  Up to 48 hours............<12.0 mg/dL  3-5 days..................<15.0 mg/dL  6-29 days.................<15.0 mg/dL     0.6  Comment:  For infants and newborns, interpretation of results should be based  on gestational age, weight and in agreement with clinical  observations.  Premature Infant recommended reference ranges:  Up to 24 hours.............<8.0 mg/dL  Up to 48 hours............<12.0 mg/dL  3-5 days..................<15.0 mg/dL  6-29  days.................<15.0 mg/dL   0.8  Comment:  For infants and newborns, interpretation of results should be based  on gestational age, weight and in agreement with clinical  observations.  Premature Infant recommended reference ranges:  Up to 24 hours.............<8.0 mg/dL  Up to 48 hours............<12.0 mg/dL  3-5 days..................<15.0 mg/dL  6-29 days.................<15.0 mg/dL       BUN, Bld 2(L)   4(L) 6     Calcium 8.4(L)   7.6(L) 8.9     Chloride 115(H)   115(H) 109     CO2 17(L)   14(L) 14(L)     Creatinine 0.7   0.5 0.6     Differential Method Automated   Automated Automated     eGFR if  >60   >60 >60     eGFR if non  >60  Comment:  Calculation used to obtain the estimated glomerular filtration  rate (eGFR) is the CKD-EPI equation.      >60  Comment:  Calculation used to obtain the estimated glomerular filtration  rate (eGFR) is the CKD-EPI equation.    >60  Comment:  Calculation used to obtain the estimated glomerular filtration  rate (eGFR) is the CKD-EPI equation.        Eos # 0.0   0.0 0.0     Eosinophil% 0.7   0.3 0.3     Glucose 116(H)   91 69(L)     Gran # (ANC) 3.4   4.5 4.5     Gran% 54.6   61.6 57.1     Group & Rh  A POS        Hematocrit 27.0(L)   26.3(L) 31.3(L)     Hemoglobin 9.8(L)   9.5(L) 11.4(L)     INDIRECT JOHANA  NEG        Lactate, Alfonso    1.3  Comment:  Falsely low lactic acid results can be found in samples   containing >=13.0 mg/dL total bilirubin and/or >=3.5 mg/dL   direct bilirubin.        Lymph # 2.2   2.0 2.3     Lymph% 35.9   27.2 29.9     Magnesium 2.2   1.6      MCH 31.6(H)   30.8 31.5(H)     MCHC 36.3(H)   36.1(H) 36.4(H)     MCV 87   85 87     Mono # 0.5   0.8 1.0     Mono% 8.6   10.5 12.3     MPV 11.9   11.3 12.5     Phosphorus <1.0  Comment:  Phosphorus critical result(s) called and verbal readback obtained   from Mihai Epps , 05/19/2018 01:04  (LL)  2.5(L) <1.0  Comment:  Phos   critical result(s) called and verbal readback  obtained from   Jenny Brasher RN , 05/18/2018 12:49  (LL)      Platelets 176   170 174     Potassium 3.8  Comment:  Specimen slightly hemolyzed   4.1  Comment:  Specimen slightly hemolyzed 3.4(L)     Total Protein 5.6(L)   4.9(L) 6.1     RBC 3.10(L)   3.08(L) 3.62(L)     RDW 13.9   13.6 13.7     Sodium 139   134(L) 132(L)     Troponin I   0.044  Comment:  The reference interval for Troponin I represents the 99th percentile   cutoff   for our facility and is consistent with 3rd generation assay   performance.  (H) 0.042  Comment:  The reference interval for Troponin I represents the 99th percentile   cutoff   for our facility and is consistent with 3rd generation assay   performance.  (H) 0.050  Comment:  The reference interval for Troponin I represents the 99th percentile   cutoff   for our facility and is consistent with 3rd generation assay   performance.  (H)     WBC 6.15   7.31 7.82                     Physical Exam:   Constitutional: She is oriented to person, place, and time. She appears well-developed and well-nourished. No distress.    HENT:   Head: Atraumatic.    Eyes: EOM are normal. Pupils are equal, round, and reactive to light.    Neck: Normal range of motion. Neck supple.    Cardiovascular: Normal rate and regular rhythm.     Pulmonary/Chest: Effort normal. No respiratory distress.        Abdominal: Soft. She exhibits no distension and no abdominal incision. There is no tenderness.             Musculoskeletal: Normal range of motion. She exhibits no edema or tenderness.       Neurological: She is alert and oriented to person, place, and time.    Skin: Skin is warm and dry. She is not diaphoretic.        CTA: Impression     Technically limited examination secondary to suboptimal opacification of the pulmonary arterial vasculature.  No evidence of large central pulmonary embolus or convincing evidence of pulmonary thromboembolism to the level of the segmental arteries or pulmonary infarction. Pulmonary  embolus distal to the segmental levels, particularly at the lung bases cannot be excluded. Correlation with d-dimer and lower extremity venous Doppler ultrasound as clinically warranted.         Assessment/Plan:     27 y.o. female  at 17w6d for:    * Hyperemesis gravidarum    - Continue fluid resuscitation and antiemetics  - S/p Banana bag  - Stagger IV phenergan and zofran q6 hours. Give IV until patient can tolerate PO medications  - Continue IV protonix and PO sucralfate  - Daily CMP. Low potassium on admit. Replace lytes PRN  - Daily CBC  - [place] diet as tolerated. Discussed emphasis on carbs.   - Check FHT daily        Severe malnutrition    - Nutrition consulted  - Slow reintroduction of food  - Recommended Boost with meals        Acute cystitis without hematuria    - Macrobid        STEMI (ST elevation myocardial infarction)    - EKG overnight indicative of STEMI. Cardiology consulted   - Nitroglycerin and aspirin administered   - Cards following. Attribute EKG changes to electrolyte abnormalities and not MI  - CTA negative  - Continuous telemetry  - Continue to monitor in ICU until lytes are normalized                Juan Ruth MD  Obstetrics  [facility]

## 2018-05-19 NOTE — NURSING
Report called to nurse all questions answered   Patient in no acute distress  Still with some pain with inspiration   No complaints of nausea this am was able to eat some of the breakfast tray   Left foot with some swelling upon assessment   teds removed since appeared tight will reassess and reapply   ivfs infusing without difficulty   Will transfer to the floor with her personal belongings

## 2018-05-19 NOTE — ASSESSMENT & PLAN NOTE
- Continue fluid resuscitation and antiemetics  - S/p Banana bag  - Stagger IV phenergan and zofran q6 hours. Give IV until patient can tolerate PO medications  - Continue IV protonix and PO sucralfate  - Daily CMP. Low potassium on admit. Replace lytes PRN  - Daily CBC  - [place] diet as tolerated. Discussed emphasis on carbs.   - Check FHT daily

## 2018-05-19 NOTE — PROGRESS NOTES
""I feel better"  C/O a little nausea. Says she had pleuritic sharp pain last evening transiently, none since.    Vitals:    05/19/18 0505 05/19/18 0600 05/19/18 0700 05/19/18 0841   BP:  (!) 93/57 (!) 93/54    Pulse:  84 86 86   Resp:  19 20 18   Temp:   98.3 °F (36.8 °C)    TempSrc:   Axillary    SpO2:  100% 100% 98%   Weight: 73.6 kg (162 lb 4.1 oz)      Height:         Chest clear  Cor: No murmur, no rub.  Ext: warm. No tenderness.    Volume status seems improved.  Atypical, non cardiogenic chest pains.  Demand troponinemia.  Nausea persists.    Plan: Transfer to the floor.  Ambulate.  "

## 2018-05-19 NOTE — PROGRESS NOTES
To bedside to reassess symptoms. Patient denies CP, reports only feels pain only with palpation to chest. She also denies SOB, pain radiating to back or jaw. She denies nausea, recently tolerated pork chops without any nausea or vomiting.    Phosphorous improved from <1.0 to 2.5  Troponin stable     Will step down to floor  Continuous telemetry    Jessica Herrera MD  Ob/Gyn PGY-2

## 2018-05-20 PROBLEM — E55.9 VITAMIN D DEFICIENCY, UNSPECIFIED: Status: ACTIVE | Noted: 2018-05-20

## 2018-05-20 PROBLEM — O21.1 HYPEREMESIS GRAVIDARUM WITH DEHYDRATION: Status: ACTIVE | Noted: 2018-04-06

## 2018-05-20 LAB
ALBUMIN SERPL BCP-MCNC: 2.7 G/DL
ALP SERPL-CCNC: 36 U/L
ALT SERPL W/O P-5'-P-CCNC: 42 U/L
ANION GAP SERPL CALC-SCNC: 10 MMOL/L
AST SERPL-CCNC: 28 U/L
BASOPHILS # BLD AUTO: 0.01 K/UL
BASOPHILS NFR BLD: 0.2 %
BILIRUB SERPL-MCNC: 0.2 MG/DL
BUN SERPL-MCNC: 5 MG/DL
CALCIUM SERPL-MCNC: 8.4 MG/DL
CHLORIDE SERPL-SCNC: 106 MMOL/L
CO2 SERPL-SCNC: 20 MMOL/L
CREAT SERPL-MCNC: 0.6 MG/DL
DIFFERENTIAL METHOD: ABNORMAL
EOSINOPHIL # BLD AUTO: 0 K/UL
EOSINOPHIL NFR BLD: 0.3 %
ERYTHROCYTE [DISTWIDTH] IN BLOOD BY AUTOMATED COUNT: 14.3 %
EST. GFR  (AFRICAN AMERICAN): >60 ML/MIN/1.73 M^2
EST. GFR  (NON AFRICAN AMERICAN): >60 ML/MIN/1.73 M^2
GLUCOSE SERPL-MCNC: 101 MG/DL
HCT VFR BLD AUTO: 27.1 %
HGB BLD-MCNC: 9.6 G/DL
LYMPHOCYTES # BLD AUTO: 2 K/UL
LYMPHOCYTES NFR BLD: 34.6 %
MAGNESIUM SERPL-MCNC: 1.8 MG/DL
MCH RBC QN AUTO: 31.3 PG
MCHC RBC AUTO-ENTMCNC: 35.4 G/DL
MCV RBC AUTO: 88 FL
MONOCYTES # BLD AUTO: 0.6 K/UL
MONOCYTES NFR BLD: 9.7 %
NEUTROPHILS # BLD AUTO: 3.2 K/UL
NEUTROPHILS NFR BLD: 54.9 %
PHOSPHATE SERPL-MCNC: 3.3 MG/DL
PHOSPHATE SERPL-MCNC: 4.3 MG/DL
PLATELET # BLD AUTO: 164 K/UL
PMV BLD AUTO: 12.5 FL
POTASSIUM SERPL-SCNC: 4.4 MMOL/L
PROT SERPL-MCNC: 5.5 G/DL
RBC # BLD AUTO: 3.07 M/UL
SODIUM SERPL-SCNC: 136 MMOL/L
WBC # BLD AUTO: 5.86 K/UL

## 2018-05-20 PROCEDURE — 83516 IMMUNOASSAY NONANTIBODY: CPT

## 2018-05-20 PROCEDURE — 25000003 PHARM REV CODE 250: Performed by: OBSTETRICS & GYNECOLOGY

## 2018-05-20 PROCEDURE — 36415 COLL VENOUS BLD VENIPUNCTURE: CPT

## 2018-05-20 PROCEDURE — 99233 SBSQ HOSP IP/OBS HIGH 50: CPT | Mod: ,,, | Performed by: OBSTETRICS & GYNECOLOGY

## 2018-05-20 PROCEDURE — 84100 ASSAY OF PHOSPHORUS: CPT | Mod: 91

## 2018-05-20 PROCEDURE — 63600175 PHARM REV CODE 636 W HCPCS: Performed by: OBSTETRICS & GYNECOLOGY

## 2018-05-20 PROCEDURE — 11000001 HC ACUTE MED/SURG PRIVATE ROOM

## 2018-05-20 PROCEDURE — 83735 ASSAY OF MAGNESIUM: CPT

## 2018-05-20 PROCEDURE — 80053 COMPREHEN METABOLIC PANEL: CPT

## 2018-05-20 PROCEDURE — 25000003 PHARM REV CODE 250: Performed by: STUDENT IN AN ORGANIZED HEALTH CARE EDUCATION/TRAINING PROGRAM

## 2018-05-20 PROCEDURE — 84100 ASSAY OF PHOSPHORUS: CPT

## 2018-05-20 PROCEDURE — 85025 COMPLETE CBC W/AUTO DIFF WBC: CPT

## 2018-05-20 RX ORDER — PANTOPRAZOLE SODIUM 40 MG/1
40 TABLET, DELAYED RELEASE ORAL 2 TIMES DAILY
Status: DISCONTINUED | OUTPATIENT
Start: 2018-05-20 | End: 2018-05-21 | Stop reason: HOSPADM

## 2018-05-20 RX ORDER — CHLORPROMAZINE HYDROCHLORIDE 25 MG/1
25 TABLET, FILM COATED ORAL 4 TIMES DAILY
Status: DISCONTINUED | OUTPATIENT
Start: 2018-05-20 | End: 2018-05-21

## 2018-05-20 RX ADMIN — PANTOPRAZOLE SODIUM 40 MG: 40 TABLET, DELAYED RELEASE ORAL at 08:05

## 2018-05-20 RX ADMIN — SUCRALFATE 1 G: 1 TABLET ORAL at 05:05

## 2018-05-20 RX ADMIN — PROMETHAZINE HYDROCHLORIDE 25 MG: 25 TABLET ORAL at 05:05

## 2018-05-20 RX ADMIN — SUCRALFATE 1 G: 1 TABLET ORAL at 06:05

## 2018-05-20 RX ADMIN — SUCRALFATE 1 G: 1 TABLET ORAL at 11:05

## 2018-05-20 RX ADMIN — PRENATAL VIT W/ FE FUMARATE-FA TAB 27-0.8 MG 1 TABLET: 27-0.8 TAB at 09:05

## 2018-05-20 RX ADMIN — CHLORPROMAZINE HYDROCHLORIDE 25 MG: 25 TABLET, SUGAR COATED ORAL at 05:05

## 2018-05-20 RX ADMIN — PANTOPRAZOLE SODIUM 40 MG: 40 TABLET, DELAYED RELEASE ORAL at 09:05

## 2018-05-20 RX ADMIN — PROMETHAZINE HYDROCHLORIDE 25 MG: 25 TABLET ORAL at 12:05

## 2018-05-20 RX ADMIN — PROMETHAZINE HYDROCHLORIDE 25 MG: 25 TABLET ORAL at 11:05

## 2018-05-20 RX ADMIN — SUCRALFATE 1 G: 1 TABLET ORAL at 08:05

## 2018-05-20 RX ADMIN — CHLORPROMAZINE HYDROCHLORIDE 25 MG: 25 TABLET, SUGAR COATED ORAL at 01:05

## 2018-05-20 RX ADMIN — ENOXAPARIN SODIUM 40 MG: 100 INJECTION SUBCUTANEOUS at 05:05

## 2018-05-20 RX ADMIN — NITROFURANTOIN (MONOHYDRATE/MACROCRYSTALS) 100 MG: 75; 25 CAPSULE ORAL at 09:05

## 2018-05-20 RX ADMIN — Medication 100 MG: at 09:05

## 2018-05-20 RX ADMIN — ONDANSETRON 4 MG: 4 TABLET, ORALLY DISINTEGRATING ORAL at 06:05

## 2018-05-20 RX ADMIN — CHLORPROMAZINE HYDROCHLORIDE 25 MG: 25 TABLET, SUGAR COATED ORAL at 08:05

## 2018-05-20 RX ADMIN — PROMETHAZINE HYDROCHLORIDE 25 MG: 25 TABLET ORAL at 06:05

## 2018-05-20 RX ADMIN — ONDANSETRON 4 MG: 4 TABLET, ORALLY DISINTEGRATING ORAL at 05:05

## 2018-05-20 RX ADMIN — ONDANSETRON 4 MG: 4 TABLET, ORALLY DISINTEGRATING ORAL at 12:05

## 2018-05-20 RX ADMIN — ONDANSETRON 4 MG: 4 TABLET, ORALLY DISINTEGRATING ORAL at 11:05

## 2018-05-20 RX ADMIN — NITROFURANTOIN (MONOHYDRATE/MACROCRYSTALS) 100 MG: 75; 25 CAPSULE ORAL at 08:05

## 2018-05-20 RX ADMIN — ERGOCALCIFEROL 50000 UNITS: 1.25 CAPSULE ORAL at 09:05

## 2018-05-20 NOTE — PLAN OF CARE
Problem: Patient Care Overview  Goal: Plan of Care Review  Outcome: Ongoing (interventions implemented as appropriate)  No significant events throughout the night. VSS. No c/o NV during the night. Pt still on tele. Able to ambulate to the bathroom and void. Continues to receive IV fluids. POC reviewed and all questions and concerns answered at this time. Remains safe and free from falls with call light in reach. Will continue to monitor.

## 2018-05-20 NOTE — ASSESSMENT & PLAN NOTE
- EKG suggestive of STEMI. Cardiology consulted   - Nitroglycerin and aspirin administered   - Cards attributed EKG changes to electrolyte abnormalities and not MI  - CTA negative  - Continuous telemetry with intermittent periods of junctional rhythm, possibly artifact

## 2018-05-20 NOTE — SUBJECTIVE & OBJECTIVE
Obstetric HPI:  Patient is stable this morning. C/o nausea but no vomiting. Tolerated regular diet yesterday. Reports mild outer chest soreness, sounds musculoskeletal in nature. Denies inner chest pain or pressure, radiation of symptoms, or shortness of breath. Denies abdominal pain/cramping or vaginal bleeding.      Objective:     Vital Signs (Most Recent):  Temp: 96.9 °F (36.1 °C) (05/20/18 0400)  Pulse: 88 (05/20/18 0600)  Resp: 18 (05/20/18 0400)  BP: 106/60 (05/20/18 0400)  SpO2: 99 % (05/19/18 1618) Vital Signs (24h Range):  Temp:  [96.8 °F (36 °C)-98.4 °F (36.9 °C)] 96.9 °F (36.1 °C)  Pulse:  [79-96] 88  Resp:  [9-20] 18  SpO2:  [98 %-100 %] 99 %  BP: ()/(50-66) 106/60     Weight: 73.6 kg (162 lb 4.1 oz)  Body mass index is 30.66 kg/m².      Intake/Output Summary (Last 24 hours) at 05/20/18 0630  Last data filed at 05/19/18 1800   Gross per 24 hour   Intake             1805 ml   Output              975 ml   Net              830 ml       Significant Labs:    Recent Labs  Lab 05/18/18  1018  05/19/18  0040 05/19/18  0635 05/19/18  1814 05/20/18  0528   *  --  139  --   --  136   K 4.1  --  3.8  --   --  4.4   *  --  115*  --   --  106   CO2 14*  --  17*  --   --  20*   BUN 4*  --  2*  --   --  5*   CREATININE 0.5  --  0.7  --   --  0.6   GLU 91  --  116*  --   --  101   PROT 4.9*  --  5.6*  --   --  5.5*   BILITOT 0.6  --  0.4  --   --  0.2   ALKPHOS 30*  --  39*  --   --  36*   ALT 72*  --  67*  --   --  42   AST 39  --  34  --   --  28   MG 1.6  --  2.2  --   --  1.8   PHOS <1.0*  < > <1.0* 1.3* 1.9* 4.3   < > = values in this interval not displayed.       Recent Labs  Lab 05/18/18  1018 05/19/18  0040 05/20/18  0528   WBC 7.31 6.15 5.86   HGB 9.5* 9.8* 9.6*   HCT 26.3* 27.0* 27.1*   MCV 85 87 88    176 164      PTH 16  Vit D 6    Physical Exam:   Constitutional: She is oriented to person, place, and time. She appears well-developed and well-nourished. No distress.    HENT:   Head:  Atraumatic.    Eyes: EOM are normal. Pupils are equal, round, and reactive to light.    Neck: Normal range of motion. Neck supple.    Cardiovascular: Normal rate and regular rhythm.     Pulmonary/Chest: Effort normal. No respiratory distress.        Abdominal: Soft. She exhibits no distension and no abdominal incision. There is no tenderness.             Musculoskeletal: Normal range of motion. She exhibits no edema or tenderness.       Neurological: She is alert and oriented to person, place, and time.    Skin: Skin is warm and dry. She is not diaphoretic.

## 2018-05-20 NOTE — ASSESSMENT & PLAN NOTE
- Continue antiemetics - PO phenergan and zofran q6 hours.   - Continue PO sucralfate, change protonix to PO  - Daily CMP, Mg, Phos -replace as needed  - [place] diet as tolerated. Discussed emphasis on carbs.   - Check FHT daily

## 2018-05-20 NOTE — PROGRESS NOTES
[facility]  Obstetrics  Antepartum Progress Note    Patient Name: Daija Purvis  MRN: [id number]  Admission Date: 2018  Hospital Length of Stay: 3 days  Attending Physician: Refugio Reed MD  Primary Care Provider: Liezt Abreu MD    Subjective:     Principal Problem:Hyperemesis gravidarum    HPI:  Daija Purvis is a 27 y.o. F at 17w4d who presents complaining of persistent nausea and vomiting with repetitive syncopal episodes. She lost consciousness in the hospital lobby when she presented today. She was coming to the hospital because of her vomiting. She has been unable to keep solids down for the past two weeks, but liquids have been staying down until the past two days. She has been taking Diclegis and phenergan at home. She reports streaks of blood in her vomit for 2 weeks, but complains of increased amounts of blood today. Patient reports having regular syncopal episodes during this pregnancy related to weakness. She has at least one episode of feeling faint per day, but does not always lose consciousness.     She also complains of abdominal pain after falling. Reports she fell directly onto her abdomen from standing. The pain is sharp, cramping, and diffuse. She denies vaginal bleeding.     Hospital Course:  2018 - Admitted for fluid resuscitation and management of hyperemesis. Later in evening elevated troponins and abnormal EKG suggestive of STEMI noted, aspiring and sublingual nitroglycerin given, cardiology and EICU consulted, patient transferred to ICU for monitoring  2018 - Doing well this morning. Nausea improved, tolerating clears. CTA negative for acute processes. Continue monitoring in ICU. Troponin trending down. Cardiology reviewed records, and did not think STEMI occurred. They attributed EKG changes to severe electrolyte deficiencies. Phos repleted overnight - initially to 2.5, however a repeat value was <1. Phos repleted a second time.    05/19/2018 - Stepped down to floor. Tolerating regular diet. No vomiting in >24 hrs. Phos continues to be low despite repetitive replacement. PTH normal. Vit D deficient, <6.      Obstetric HPI:  Patient is stable this morning. C/o nausea but no vomiting. Tolerated regular diet yesterday. Reports mild outer chest soreness, sounds musculoskeletal in nature. Denies inner chest pain or pressure, radiation of symptoms, or shortness of breath. Denies abdominal pain/cramping or vaginal bleeding.      Objective:     Vital Signs (Most Recent):  Temp: 96.9 °F (36.1 °C) (05/20/18 0400)  Pulse: 88 (05/20/18 0600)  Resp: 18 (05/20/18 0400)  BP: 106/60 (05/20/18 0400)  SpO2: 99 % (05/19/18 1618) Vital Signs (24h Range):  Temp:  [96.8 °F (36 °C)-98.4 °F (36.9 °C)] 96.9 °F (36.1 °C)  Pulse:  [79-96] 88  Resp:  [9-20] 18  SpO2:  [98 %-100 %] 99 %  BP: ()/(50-66) 106/60     Weight: 73.6 kg (162 lb 4.1 oz)  Body mass index is 30.66 kg/m².      Intake/Output Summary (Last 24 hours) at 05/20/18 0630  Last data filed at 05/19/18 1800   Gross per 24 hour   Intake             1805 ml   Output              975 ml   Net              830 ml       Significant Labs:    Recent Labs  Lab 05/18/18  1018  05/19/18  0040 05/19/18  0635 05/19/18  1814 05/20/18  0528   *  --  139  --   --  136   K 4.1  --  3.8  --   --  4.4   *  --  115*  --   --  106   CO2 14*  --  17*  --   --  20*   BUN 4*  --  2*  --   --  5*   CREATININE 0.5  --  0.7  --   --  0.6   GLU 91  --  116*  --   --  101   PROT 4.9*  --  5.6*  --   --  5.5*   BILITOT 0.6  --  0.4  --   --  0.2   ALKPHOS 30*  --  39*  --   --  36*   ALT 72*  --  67*  --   --  42   AST 39  --  34  --   --  28   MG 1.6  --  2.2  --   --  1.8   PHOS <1.0*  < > <1.0* 1.3* 1.9* 4.3   < > = values in this interval not displayed.       Recent Labs  Lab 05/18/18  1018 05/19/18  0040 05/20/18  0528   WBC 7.31 6.15 5.86   HGB 9.5* 9.8* 9.6*   HCT 26.3* 27.0* 27.1*   MCV 85 87 88    176  164      PTH 16  Vit D 6    Physical Exam:   Constitutional: She is oriented to person, place, and time. She appears well-developed and well-nourished. No distress.    HENT:   Head: Atraumatic.    Eyes: EOM are normal. Pupils are equal, round, and reactive to light.    Neck: Normal range of motion. Neck supple.    Cardiovascular: Normal rate and regular rhythm.     Pulmonary/Chest: Effort normal. No respiratory distress.        Abdominal: Soft. She exhibits no distension and no abdominal incision. There is no tenderness.             Musculoskeletal: Normal range of motion. She exhibits no edema or tenderness.       Neurological: She is alert and oriented to person, place, and time.    Skin: Skin is warm and dry. She is not diaphoretic.              Assessment/Plan:     27 y.o. female  at 18w0d for:    * Hyperemesis gravidarum    - Continue antiemetics - PO phenergan and zofran q6 hours.   - Continue PO sucralfate, change protonix to PO  - Daily CMP, Mg, Phos -replace as needed  - [place] diet as tolerated. Discussed emphasis on carbs.   - Check FHT daily        Vitamin D deficiency, unspecified    - vit D 6 c/w severe deficiency  - vit D 50,000 U weekly, first dose today        Severe malnutrition    - Nutrition consulted  - Slow reintroduction of food  - Boost with meals        Acute cystitis without hematuria    - Macrobid        STEMI (ST elevation myocardial infarction)    - EKG suggestive of STEMI. Cardiology consulted   - Nitroglycerin and aspirin administered   - Cards attributed EKG changes to electrolyte abnormalities and not MI  - CTA negative  - Continuous telemetry with intermittent periods of junctional rhythm, possibly artifact                Claudia Seymour MD  Obstetrics  [facility]

## 2018-05-20 NOTE — NURSING
RN notified by tele. Tech. that pt's rhythm keeps changing from sinus to junctional. HR still remains WNL. Notified Dr. Moreno. No new orders at this time. Will continue to monitor.

## 2018-05-21 VITALS
SYSTOLIC BLOOD PRESSURE: 126 MMHG | WEIGHT: 162.25 LBS | OXYGEN SATURATION: 100 % | BODY MASS INDEX: 30.63 KG/M2 | HEART RATE: 100 BPM | DIASTOLIC BLOOD PRESSURE: 66 MMHG | HEIGHT: 61 IN | RESPIRATION RATE: 16 BRPM | TEMPERATURE: 96 F

## 2018-05-21 PROBLEM — R74.8 ELEVATED LIVER ENZYMES: Status: ACTIVE | Noted: 2018-05-21

## 2018-05-21 PROBLEM — E43 SEVERE MALNUTRITION: Status: ACTIVE | Noted: 2018-05-21

## 2018-05-21 PROBLEM — R79.89 ELEVATED TROPONIN: Status: ACTIVE | Noted: 2018-05-21

## 2018-05-21 PROBLEM — N30.00 ACUTE CYSTITIS WITHOUT HEMATURIA: Status: ACTIVE | Noted: 2018-05-21

## 2018-05-21 PROBLEM — E55.9 VITAMIN D DEFICIENCY, UNSPECIFIED: Status: ACTIVE | Noted: 2018-05-21

## 2018-05-21 LAB
ALBUMIN SERPL BCP-MCNC: 2.9 G/DL
ALP SERPL-CCNC: 41 U/L
ALT SERPL W/O P-5'-P-CCNC: 34 U/L
ANION GAP SERPL CALC-SCNC: 9 MMOL/L
AST SERPL-CCNC: 15 U/L
BASOPHILS # BLD AUTO: 0.01 K/UL
BASOPHILS NFR BLD: 0.2 %
BILIRUB SERPL-MCNC: 0.3 MG/DL
BUN SERPL-MCNC: 5 MG/DL
CALCIUM SERPL-MCNC: 9.2 MG/DL
CHLORIDE SERPL-SCNC: 101 MMOL/L
CO2 SERPL-SCNC: 27 MMOL/L
CREAT SERPL-MCNC: 0.6 MG/DL
DIFFERENTIAL METHOD: ABNORMAL
EOSINOPHIL # BLD AUTO: 0 K/UL
EOSINOPHIL NFR BLD: 0.5 %
ERYTHROCYTE [DISTWIDTH] IN BLOOD BY AUTOMATED COUNT: 14.1 %
EST. GFR  (AFRICAN AMERICAN): >60 ML/MIN/1.73 M^2
EST. GFR  (NON AFRICAN AMERICAN): >60 ML/MIN/1.73 M^2
GLUCOSE SERPL-MCNC: 77 MG/DL
HCT VFR BLD AUTO: 30.2 %
HGB BLD-MCNC: 10.7 G/DL
LYMPHOCYTES # BLD AUTO: 2.7 K/UL
LYMPHOCYTES NFR BLD: 42 %
MAGNESIUM SERPL-MCNC: 1.5 MG/DL
MCH RBC QN AUTO: 31.5 PG
MCHC RBC AUTO-ENTMCNC: 35.4 G/DL
MCV RBC AUTO: 89 FL
MONOCYTES # BLD AUTO: 0.5 K/UL
MONOCYTES NFR BLD: 7.6 %
NEUTROPHILS # BLD AUTO: 3.2 K/UL
NEUTROPHILS NFR BLD: 49.5 %
PHOSPHATE SERPL-MCNC: 4.2 MG/DL
PLATELET # BLD AUTO: 159 K/UL
PMV BLD AUTO: 11.9 FL
POCT GLUCOSE: 86 MG/DL (ref 70–110)
POTASSIUM SERPL-SCNC: 3 MMOL/L
PROT SERPL-MCNC: 5.5 G/DL
RBC # BLD AUTO: 3.4 M/UL
SODIUM SERPL-SCNC: 137 MMOL/L
WBC # BLD AUTO: 6.43 K/UL

## 2018-05-21 PROCEDURE — 25000003 PHARM REV CODE 250: Performed by: STUDENT IN AN ORGANIZED HEALTH CARE EDUCATION/TRAINING PROGRAM

## 2018-05-21 PROCEDURE — 80053 COMPREHEN METABOLIC PANEL: CPT

## 2018-05-21 PROCEDURE — 25000003 PHARM REV CODE 250: Performed by: OBSTETRICS & GYNECOLOGY

## 2018-05-21 PROCEDURE — 85025 COMPLETE CBC W/AUTO DIFF WBC: CPT

## 2018-05-21 PROCEDURE — 99238 HOSP IP/OBS DSCHRG MGMT 30/<: CPT | Mod: ,,, | Performed by: OBSTETRICS & GYNECOLOGY

## 2018-05-21 PROCEDURE — 93010 ELECTROCARDIOGRAM REPORT: CPT | Mod: ,,, | Performed by: INTERNAL MEDICINE

## 2018-05-21 PROCEDURE — 36415 COLL VENOUS BLD VENIPUNCTURE: CPT

## 2018-05-21 PROCEDURE — 83735 ASSAY OF MAGNESIUM: CPT

## 2018-05-21 PROCEDURE — 84100 ASSAY OF PHOSPHORUS: CPT

## 2018-05-21 PROCEDURE — 93005 ELECTROCARDIOGRAM TRACING: CPT

## 2018-05-21 PROCEDURE — 99900035 HC TECH TIME PER 15 MIN (STAT)

## 2018-05-21 RX ORDER — ONDANSETRON 4 MG/1
4 TABLET, ORALLY DISINTEGRATING ORAL EVERY 8 HOURS
Status: DISCONTINUED | OUTPATIENT
Start: 2018-05-21 | End: 2018-05-21 | Stop reason: HOSPADM

## 2018-05-21 RX ORDER — ONDANSETRON 4 MG/1
4 TABLET, ORALLY DISINTEGRATING ORAL EVERY 8 HOURS
Qty: 90 TABLET | Refills: 3 | OUTPATIENT
Start: 2018-05-21

## 2018-05-21 RX ORDER — CHLORPROMAZINE HYDROCHLORIDE 25 MG/1
25 TABLET, FILM COATED ORAL 3 TIMES DAILY
Status: DISCONTINUED | OUTPATIENT
Start: 2018-05-21 | End: 2018-05-21 | Stop reason: HOSPADM

## 2018-05-21 RX ORDER — PANTOPRAZOLE SODIUM 40 MG/1
40 TABLET, DELAYED RELEASE ORAL DAILY
Qty: 30 TABLET | Refills: 5 | Status: SHIPPED | OUTPATIENT
Start: 2018-05-21 | End: 2018-05-29

## 2018-05-21 RX ORDER — PROMETHAZINE HYDROCHLORIDE 25 MG/1
25 TABLET ORAL EVERY 6 HOURS
Qty: 60 TABLET | Refills: 1 | Status: SHIPPED | OUTPATIENT
Start: 2018-05-21 | End: 2018-05-29

## 2018-05-21 RX ORDER — MULTIVITAMIN
2 TABLET ORAL DAILY
Qty: 30 TABLET | Refills: 3 | COMMUNITY
Start: 2018-05-21 | End: 2019-01-16

## 2018-05-21 RX ORDER — CHLORPROMAZINE HYDROCHLORIDE 25 MG/1
25 TABLET, FILM COATED ORAL 3 TIMES DAILY
Qty: 90 TABLET | Refills: 5 | Status: ON HOLD | OUTPATIENT
Start: 2018-05-21 | End: 2018-09-18 | Stop reason: SDUPTHER

## 2018-05-21 RX ORDER — PROMETHAZINE HYDROCHLORIDE 25 MG/1
25 TABLET ORAL EVERY 6 HOURS PRN
Status: DISCONTINUED | OUTPATIENT
Start: 2018-05-21 | End: 2018-05-21 | Stop reason: HOSPADM

## 2018-05-21 RX ORDER — CHLORPROMAZINE HYDROCHLORIDE 25 MG/1
25 TABLET, FILM COATED ORAL 3 TIMES DAILY
Qty: 90 TABLET | Refills: 11 | OUTPATIENT
Start: 2018-05-21 | End: 2019-05-21

## 2018-05-21 RX ORDER — ONDANSETRON 4 MG/1
4 TABLET, FILM COATED ORAL EVERY 6 HOURS PRN
Qty: 60 TABLET | Refills: 1 | Status: SHIPPED | OUTPATIENT
Start: 2018-05-21 | End: 2018-05-29 | Stop reason: SDUPTHER

## 2018-05-21 RX ADMIN — PROMETHAZINE HYDROCHLORIDE 25 MG: 25 TABLET ORAL at 12:05

## 2018-05-21 RX ADMIN — Medication 100 MG: at 09:05

## 2018-05-21 RX ADMIN — ONDANSETRON 4 MG: 4 TABLET, ORALLY DISINTEGRATING ORAL at 12:05

## 2018-05-21 RX ADMIN — PRENATAL VIT W/ FE FUMARATE-FA TAB 27-0.8 MG 1 TABLET: 27-0.8 TAB at 09:05

## 2018-05-21 RX ADMIN — SUCRALFATE 1 G: 1 TABLET ORAL at 01:05

## 2018-05-21 RX ADMIN — ONDANSETRON 4 MG: 4 TABLET, ORALLY DISINTEGRATING ORAL at 06:05

## 2018-05-21 RX ADMIN — CHLORPROMAZINE HYDROCHLORIDE 25 MG: 25 TABLET, SUGAR COATED ORAL at 09:05

## 2018-05-21 RX ADMIN — PANTOPRAZOLE SODIUM 40 MG: 40 TABLET, DELAYED RELEASE ORAL at 09:05

## 2018-05-21 RX ADMIN — NITROFURANTOIN (MONOHYDRATE/MACROCRYSTALS) 100 MG: 75; 25 CAPSULE ORAL at 09:05

## 2018-05-21 RX ADMIN — PROMETHAZINE HYDROCHLORIDE 25 MG: 25 TABLET ORAL at 06:05

## 2018-05-21 RX ADMIN — CHLORPROMAZINE HYDROCHLORIDE 25 MG: 25 TABLET, SUGAR COATED ORAL at 01:05

## 2018-05-21 RX ADMIN — SUCRALFATE 1 G: 1 TABLET ORAL at 09:05

## 2018-05-21 NOTE — PROGRESS NOTES
[facility]  Obstetrics  Antepartum Progress Note    Patient Name: Daija Purvis  MRN: [id number]  Admission Date: 2018  Hospital Length of Stay: 4 days  Attending Physician: Refugio Reed MD  Primary Care Provider: Lizet Abreu MD    Subjective:     Principal Problem:Hyperemesis gravidarum with dehydration    HPI:  Daija Purvis is a 27 y.o. F at 17w4d who presents complaining of persistent nausea and vomiting with repetitive syncopal episodes. She lost consciousness in the hospital lobby when she presented today. She was coming to the hospital because of her vomiting. She has been unable to keep solids down for the past two weeks, but liquids have been staying down until the past two days. She has been taking Diclegis and phenergan at home. She reports streaks of blood in her vomit for 2 weeks, but complains of increased amounts of blood today. Patient reports having regular syncopal episodes during this pregnancy related to weakness. She has at least one episode of feeling faint per day, but does not always lose consciousness.     She also complains of abdominal pain after falling. Reports she fell directly onto her abdomen from standing. The pain is sharp, cramping, and diffuse. She denies vaginal bleeding.     Hospital Course:  2018 - Admitted for fluid resuscitation and management of hyperemesis. Later in evening elevated troponins and abnormal EKG suggestive of STEMI noted, aspiring and sublingual nitroglycerin given, cardiology and EICU consulted, patient transferred to ICU for monitoring  2018 - Doing well this morning. Nausea improved, tolerating clears. CTA negative for acute processes. Continue monitoring in ICU. Troponin trending down. Cardiology reviewed records, and did not think STEMI occurred. They attributed EKG changes to severe electrolyte deficiencies. Phos repleted overnight - initially to 2.5, however a repeat value was <1. Phos repleted a  second time.   05/19/2018 - Stepped down to floor. Tolerating regular diet. No vomiting in >24 hrs. Phos continues to be low despite repetitive replacement. PTH normal. Vit D deficient, <6.  05/21/2018 - HD#5. Vomiting overnight. Nausea slightly improved with thorazine. Denies chest pain or palpitations. Continue replenishing lytes.     Obstetric HPI:  Patient is stable this morning. Vomited once yesterday evening and at 0400 this morning. Tolerated regular diet yesterday. Reports musculoskeletal chest pain. Denies inner chest pain or pressure, radiation of symptoms, or shortness of breath. Denies abdominal pain/cramping or vaginal bleeding.      Objective:     Vital Signs (Most Recent):  Temp: 96.4 °F (35.8 °C) (05/21/18 0410)  Pulse: 87 (05/21/18 0410)  Resp: 16 (05/21/18 0410)  BP: (!) 113/59 (05/21/18 0410)  SpO2: 100 % (05/20/18 1134) Vital Signs (24h Range):  Temp:  [96.4 °F (35.8 °C)-97.5 °F (36.4 °C)] 96.4 °F (35.8 °C)  Pulse:  [] 87  Resp:  [16-18] 16  SpO2:  [100 %] 100 %  BP: ()/(55-73) 113/59     Weight: 73.6 kg (162 lb 4.1 oz)  Body mass index is 30.66 kg/m².      Intake/Output Summary (Last 24 hours) at 05/21/18 0618  Last data filed at 05/20/18 1839   Gross per 24 hour   Intake             2120 ml   Output             1400 ml   Net              720 ml       Significant Labs:    Recent Labs  Lab 05/18/18  1018  05/19/18  0040  05/19/18  1814 05/20/18  0528 05/20/18  1400   *  --  139  --   --  136  --    K 4.1  --  3.8  --   --  4.4  --    *  --  115*  --   --  106  --    CO2 14*  --  17*  --   --  20*  --    BUN 4*  --  2*  --   --  5*  --    CREATININE 0.5  --  0.7  --   --  0.6  --    GLU 91  --  116*  --   --  101  --    PROT 4.9*  --  5.6*  --   --  5.5*  --    BILITOT 0.6  --  0.4  --   --  0.2  --    ALKPHOS 30*  --  39*  --   --  36*  --    ALT 72*  --  67*  --   --  42  --    AST 39  --  34  --   --  28  --    MG 1.6  --  2.2  --   --  1.8  --    PHOS <1.0*  < > <1.0*   < > 1.9* 4.3 3.3   < > = values in this interval not displayed.       Recent Labs  Lab 18  1018 18  0040 18  0528   WBC 7.31 6.15 5.86   HGB 9.5* 9.8* 9.6*   HCT 26.3* 27.0* 27.1*   MCV 85 87 88    176 164      PTH 16  Vit D 6    Physical Exam:   Constitutional: She is oriented to person, place, and time. She appears well-developed and well-nourished. No distress.    HENT:   Head: Atraumatic.    Eyes: EOM are normal. Pupils are equal, round, and reactive to light.    Neck: Normal range of motion. Neck supple.    Cardiovascular: Normal rate and regular rhythm.     Pulmonary/Chest: Effort normal. No respiratory distress.        Abdominal: Soft. She exhibits no distension and no abdominal incision. There is no tenderness.             Musculoskeletal: Normal range of motion. She exhibits no edema or tenderness.       Neurological: She is alert and oriented to person, place, and time. She has normal reflexes.    Skin: Skin is warm and dry. She is not diaphoretic.              Assessment/Plan:     27 y.o. female  at 18w1d for:    * Hyperemesis gravidarum with dehydration    - Continue antiemetics - PO phenergan, zofran, and thorazine q6 hours.   - Continue PO sucralfate and protonix   - Daily CMP, Mg, Phos -replace as needed  - [place] diet as tolerated. Discussed emphasis on carbs.   - Check T daily        Vitamin D deficiency, unspecified    - vit D 6 c/w severe deficiency  - vit D 50,000 U weekly        Severe malnutrition    - Nutrition consulted  - Slow reintroduction of food  - Boost with meals        Acute cystitis without hematuria    - Macrobid        STEMI (ST elevation myocardial infarction)    - EKG suggestive of STEMI. Cardiology consulted   - Nitroglycerin and aspirin administered   - Cards attributed EKG changes to electrolyte abnormalities and not MI  - CTA negative  - Continuous telemetry with intermittent periods of junctional rhythm, possibly artifact. Consider discontinuing  today                Juan Ruth MD  Obstetrics  [facility]

## 2018-05-21 NOTE — ASSESSMENT & PLAN NOTE
- Continue antiemetics - PO phenergan, zofran, and thorazine q6 hours.   - Continue PO sucralfate and protonix   - Daily CMP, Mg, Phos -replace as needed  - [place] diet as tolerated. Discussed emphasis on carbs.   - Check FHT daily

## 2018-05-21 NOTE — PHYSICIAN QUERY
PT Name: Daija Purvis  MR #: [id number]     Physician Query Form - Documentation Clarification      CDS/: KAREEM Johns,RNC-MNN           Contact information:[email or web address]    This form is a permanent document in the medical record.     Query Date: May 21, 2018    By submitting this query, we are merely seeking further clarification of documentation. Please utilize your independent clinical judgment when addressing the question(s) below.    The Medical record reflects the following:    Supporting Clinical Findings Location in Medical Record   UDS pos THC. Patient was in semi-private room so could not discuss fully. I discussed that we would need her to take only antiemetics and medications prescribed. We did not get to discuss whether frequent MJ use or cyclic vomiting syndrome could be a component of this.    17w4d who presents complaining of persistent nausea and vomiting with repetitive syncopal episodes   OB Progress note 5/18@1232pm                  H&P 5/17                                                                                        Doctor, Please specify diagnosis or diagnoses associated with above clinical findings.    Provider Use Only      [  ] Drug use complicating pregnancy  [  ] Drug abuse complicating pregnancy  [  ] Other, please specify:________________________________________                                                                                                                   [ x ] Clinically undetermined

## 2018-05-21 NOTE — PLAN OF CARE
Problem: Patient Care Overview  Goal: Plan of Care Review  Vital signs stable, afebrile; able to tolerate small amounts of food and liquids and tolerated most of her lunch s nausea or vomiting; voiding s difficulty; discharge instructions and will follow up with  in one week.

## 2018-05-21 NOTE — ASSESSMENT & PLAN NOTE
- EKG suggestive of STEMI. Cardiology consulted   - Nitroglycerin and aspirin administered   - Cards attributed EKG changes to electrolyte abnormalities and not MI  - CTA negative  - Continuous telemetry with intermittent periods of junctional rhythm, possibly artifact. Consider discontinuing today

## 2018-05-21 NOTE — SUBJECTIVE & OBJECTIVE
Obstetric HPI:  Patient is stable this morning. Vomited once yesterday evening and at 0400 this morning. Tolerated regular diet yesterday. Reports musculoskeletal chest pain. Denies inner chest pain or pressure, radiation of symptoms, or shortness of breath. Denies abdominal pain/cramping or vaginal bleeding.      Objective:     Vital Signs (Most Recent):  Temp: 96.4 °F (35.8 °C) (05/21/18 0410)  Pulse: 87 (05/21/18 0410)  Resp: 16 (05/21/18 0410)  BP: (!) 113/59 (05/21/18 0410)  SpO2: 100 % (05/20/18 1134) Vital Signs (24h Range):  Temp:  [96.4 °F (35.8 °C)-97.5 °F (36.4 °C)] 96.4 °F (35.8 °C)  Pulse:  [] 87  Resp:  [16-18] 16  SpO2:  [100 %] 100 %  BP: ()/(55-73) 113/59     Weight: 73.6 kg (162 lb 4.1 oz)  Body mass index is 30.66 kg/m².      Intake/Output Summary (Last 24 hours) at 05/21/18 0618  Last data filed at 05/20/18 1839   Gross per 24 hour   Intake             2120 ml   Output             1400 ml   Net              720 ml       Significant Labs:    Recent Labs  Lab 05/18/18  1018  05/19/18  0040  05/19/18  1814 05/20/18  0528 05/20/18  1400   *  --  139  --   --  136  --    K 4.1  --  3.8  --   --  4.4  --    *  --  115*  --   --  106  --    CO2 14*  --  17*  --   --  20*  --    BUN 4*  --  2*  --   --  5*  --    CREATININE 0.5  --  0.7  --   --  0.6  --    GLU 91  --  116*  --   --  101  --    PROT 4.9*  --  5.6*  --   --  5.5*  --    BILITOT 0.6  --  0.4  --   --  0.2  --    ALKPHOS 30*  --  39*  --   --  36*  --    ALT 72*  --  67*  --   --  42  --    AST 39  --  34  --   --  28  --    MG 1.6  --  2.2  --   --  1.8  --    PHOS <1.0*  < > <1.0*  < > 1.9* 4.3 3.3   < > = values in this interval not displayed.       Recent Labs  Lab 05/18/18  1018 05/19/18  0040 05/20/18  0528   WBC 7.31 6.15 5.86   HGB 9.5* 9.8* 9.6*   HCT 26.3* 27.0* 27.1*   MCV 85 87 88    176 164      PTH 16  Vit D 6    Physical Exam:   Constitutional: She is oriented to person, place, and time. She  appears well-developed and well-nourished. No distress.    HENT:   Head: Atraumatic.    Eyes: EOM are normal. Pupils are equal, round, and reactive to light.    Neck: Normal range of motion. Neck supple.    Cardiovascular: Normal rate and regular rhythm.     Pulmonary/Chest: Effort normal. No respiratory distress.        Abdominal: Soft. She exhibits no distension and no abdominal incision. There is no tenderness.             Musculoskeletal: Normal range of motion. She exhibits no edema or tenderness.       Neurological: She is alert and oriented to person, place, and time. She has normal reflexes.    Skin: Skin is warm and dry. She is not diaphoretic.

## 2018-05-21 NOTE — PHYSICIAN QUERY
PT Name: Daija Purvis  MR #: [id number]     Physician Query Form - Diagnosis Clarification      CDS/: KAREEM Johns,RNC-MNN           Contact information:[email or web address]    This form is a permanent document in the medical record.     Query Date: May 21, 2018    By submitting this query, we are merely seeking further clarification of documentation.  Please utilize your independent clinical judgment when addressing the question(s) below.     The medical record contains the following:      Findings Supporting Clinical Information Location in Medical Record   1. Transient hematemesis; possible Yun-Wies tear - stable         17w4d who presents complaining of persistent nausea and vomiting with repetitive syncopal episodes. She lost consciousness in the hospital lobby when she presented today. She was coming to the hospital because of her vomiting. She has been unable to keep solids down for the past two weeks, but liquids have been staying down until the past two days. She has been taking Diclegis and phenergan at home. She reports streaks of blood in her vomit for 2 weeks, but complains of increased amounts of blood today. Patient reports having regular syncopal episodes during this pregnancy related to weakness. She has at least one episode of feeling faint per day, but does not always lose consciousness.  Critical care progress note 5/19@1101am      H&P 5/17     Please clarify if the Yun-Wies tear diagnosis has been:    [  ] Ruled In  [ x ] Ruled In, Now Resolved  [  ] Ruled Out  [  ] Clinically insignificant  [  ] Clinically undetermined  [  ] Other/Clarification of findings (please specify)_______________________________    Please document in your progress notes daily for the duration of treatment, until resolved, and include in your discharge summary.

## 2018-05-21 NOTE — PROGRESS NOTES
I continue with nausea. No further chest discomfort.    Vitals:    05/21/18 0400 05/21/18 0410 05/21/18 0600 05/21/18 0700   BP:  (!) 113/59     BP Location:  Right arm     Patient Position:  Lying     Pulse: 92 87 86 87   Resp:  16     Temp:  96.4 °F (35.8 °C)     TempSrc:  Temporal     SpO2:       Weight:       Height:         Chest clear  Cor: No murmur or gallop.    Imp: Hyperemesis  Syncope related to intravascular volume depletion.  Atypical chest pains, non cardiac  Early repolarization, a normal variant.    Will stop following. Please reconsult if needed.

## 2018-05-21 NOTE — DISCHARGE SUMMARY
[facility]  Obstetrics  Discharge Summary      Patient Name: Daija Purvis  MRN: [id number]  Admission Date: 2018  Hospital Length of Stay: 4 days  Discharge Date and Time:  2018 4:07 PM  Attending Physician: Refugio Reed MD   Discharging Provider: Mohini York MD  Primary Care Provider: Lizet Abreu MD    HPI: Daija Purvis is a 27 y.o. F at 17w4d who presents complaining of persistent nausea and vomiting with repetitive syncopal episodes. She lost consciousness in the hospital lobby when she presented today. She was coming to the hospital because of her vomiting. She has been unable to keep solids down for the past two weeks, but liquids have been staying down until the past two days. She has been taking Diclegis and phenergan at home. She reports streaks of blood in her vomit for 2 weeks, but complains of increased amounts of blood today. Patient reports having regular syncopal episodes during this pregnancy related to weakness. She has at least one episode of feeling faint per day, but does not always lose consciousness.     She also complains of abdominal pain after falling. Reports she fell directly onto her abdomen from standing. The pain is sharp, cramping, and diffuse. She denies vaginal bleeding.     * No surgery found *     Hospital Course:   2018 - Admitted for fluid resuscitation and management of hyperemesis. Later in evening elevated troponins and abnormal EKG suggestive of STEMI noted, aspiring and sublingual nitroglycerin given, cardiology and EICU consulted, patient transferred to ICU for monitoring  2018 - Doing well this morning. Nausea improved, tolerating clears. CTA negative for acute processes. Continue monitoring in ICU. Troponin trending down. Cardiology reviewed records, and did not think STEMI occurred. They attributed EKG changes to severe electrolyte deficiencies. Phos repleted overnight - initially to 2.5, however a  repeat value was <1. Phos repleted a second time.   05/19/2018 - Stepped down to floor. Tolerating regular diet. No vomiting in >24 hrs. Phos continues to be low despite repetitive replacement. PTH normal. Vit D deficient, <6.  05/21/2018 - HD#5. Vomiting overnight. Nausea slightly improved with thorazine. Denies chest pain or palpitations. Continue replenishing lytes.     Consults         Status Ordering Provider     Inpatient consult to Anesthesiology-[facility]  Once     Provider:  (Not yet assigned)    Acknowledged FRANK ALVAREZ     Inpatient consult to Cardiology  Once     Provider:  (Not yet assigned)    Acknowledged FRANK ALVAREZ     Inpatient consult to Critical Care Medicine  Once     Provider:  Jarrod Sanders MD    Completed FRANK ALVAREZ     Inpatient consult to Obstetrics / Gynecology  Once     Provider:  Refugio Reed MD    Completed ELLA BARRIENTOS     Inpatient consult to PICC team ([facility])  Once     Provider:  (Not yet assigned)    Acknowledged JOJO FERNANDEZ     Inpatient consult to Registered Dietitian/Nutritionist  Once     Provider:  (Not yet assigned)    Completed ELLA LÓPEZ          Final Active Diagnoses:    Diagnosis Date Noted POA    PRINCIPAL PROBLEM:  Hyperemesis gravidarum with dehydration [O21.1] 04/06/2018 Yes    Acute cystitis without hematuria [N30.00] 05/21/2018 Yes    Severe malnutrition [E43] 05/21/2018 Yes    Vitamin D deficiency, unspecified [E55.9] 05/21/2018 Yes    Elevated liver enzymes [R74.8] 05/21/2018 Yes    Elevated troponin [R74.8] 05/21/2018 Yes    STEMI (ST elevation myocardial infarction) [I21.3] 05/17/2018 Yes      Problems Resolved During this Admission:    Diagnosis Date Noted Date Resolved POA        Labs:   CMP   Recent Labs  Lab 05/20/18  0528 05/21/18  0625    137   K 4.4 3.0*    101   CO2 20* 27    77   BUN 5* 5*   CREATININE 0.6 0.6   CALCIUM 8.4* 9.2   PROT 5.5* 5.5*   ALBUMIN 2.7* 2.9*   BILITOT 0.2 0.3   ALKPHOS  36* 41*   AST 28 15   ALT 42 34   ANIONGAP 10 9   ESTGFRAFRICA >60 >60   EGFRNONAA >60 >60    and CBC   Recent Labs  Lab 05/20/18 0528 05/21/18 0625   WBC 5.86 6.43   HGB 9.6* 10.7*   HCT 27.1* 30.2*    159       Immunizations     None          This patient has no babies on file.  Pending Diagnostic Studies:     Procedure Component Value Units Date/Time    Tissue transglutaminase, IgA [[id number]] Collected:  05/20/18 0528    Order Status:  Sent Lab Status:  In process Updated:  05/20/18 0843    Specimen:  Blood from Blood           Discharged Condition: good    Disposition: Home or Self Care    Follow Up:  Follow-up Information     [facility]. Schedule an appointment as soon as possible for a visit in 1 week.    Specialty:  Obstetrics and Gynecology  Why:  Follow up hyperemesis  Contact information:  [address] [address] [phone]               Patient Instructions:     Activity as tolerated     Notify your health care provider if you experience any of the following:  temperature >100.4     Notify your health care provider if you experience any of the following:  persistent nausea and vomiting or diarrhea     Notify your health care provider if you experience any of the following:  severe uncontrolled pain     Notify your health care provider if you experience any of the following:  redness, tenderness, or signs of infection (pain, swelling, redness, odor or green/yellow discharge around incision site)     Notify your health care provider if you experience any of the following:  difficulty breathing or increased cough     Notify your health care provider if you experience any of the following:  severe persistent headache     Notify your health care provider if you experience any of the following:  worsening rash     Notify your health care provider if you experience any of the following:  persistent dizziness, light-headedness, or visual disturbances      Notify your health care provider if you experience any of the following:  increased confusion or weakness       Medications:  Current Discharge Medication List      START taking these medications    Details   calcium-vitamin D 600 mg(1,500mg) -400 unit Tab Take 2 tablets by mouth once daily.  Qty: 30 tablet, Refills: 3      chlorproMAZINE (THORAZINE) 25 MG tablet Take 1 tablet (25 mg total) by mouth 3 (three) times daily.  Qty: 90 tablet, Refills: 5      pantoprazole (PROTONIX) 40 MG tablet Take 1 tablet (40 mg total) by mouth once daily.  Qty: 30 tablet, Refills: 5      promethazine (PHENERGAN) 25 MG tablet Take 1 tablet (25 mg total) by mouth every 6 (six) hours.  Qty: 60 tablet, Refills: 1         CONTINUE these medications which have CHANGED    Details   ondansetron (ZOFRAN) 4 MG tablet Take 1 tablet (4 mg total) by mouth every 6 (six) hours as needed for Nausea.  Qty: 60 tablet, Refills: 1         CONTINUE these medications which have NOT CHANGED    Details   famotidine (PEPCID) 20 MG tablet Take 1 tablet (20 mg total) by mouth 2 (two) times daily.  Qty: 60 tablet, Refills: 11    Associated Diagnoses: Hyperemesis gravidarum         STOP taking these medications       doxylamine succinate 25 mg tablet Comments:   Reason for Stopping:         pyridoxine, vitamin B6, (B-6) 50 MG Tab Comments:   Reason for Stopping:               Mohini York MD  Obstetrics  [facility]

## 2018-05-21 NOTE — DISCHARGE INSTRUCTIONS
Call  if vomiting returns more than once or twice a day, weakness, passing out, pain, cramping, vaginal bleeding/leaking of fluid; try to eat bland foods and do not eat/drink any thing that causes nausea/vomiting;

## 2018-05-22 LAB — TTG IGA SER IA-ACNC: 4 UNITS

## 2018-05-24 ENCOUNTER — PATIENT MESSAGE (OUTPATIENT)
Dept: OBSTETRICS AND GYNECOLOGY | Facility: CLINIC | Age: 28
End: 2018-05-24

## 2018-05-29 ENCOUNTER — ROUTINE PRENATAL (OUTPATIENT)
Dept: OBSTETRICS AND GYNECOLOGY | Facility: CLINIC | Age: 28
End: 2018-05-29
Payer: MEDICAID

## 2018-05-29 VITALS
BODY MASS INDEX: 29.24 KG/M2 | WEIGHT: 154.75 LBS | SYSTOLIC BLOOD PRESSURE: 118 MMHG | DIASTOLIC BLOOD PRESSURE: 68 MMHG

## 2018-05-29 DIAGNOSIS — O21.0 HYPEREMESIS GRAVIDARUM: ICD-10-CM

## 2018-05-29 DIAGNOSIS — O21.1 HYPEREMESIS GRAVIDARUM WITH DEHYDRATION: Primary | ICD-10-CM

## 2018-05-29 DIAGNOSIS — O09.92 SUPERVISION OF HIGH RISK PREGNANCY IN SECOND TRIMESTER: ICD-10-CM

## 2018-05-29 PROBLEM — O09.90 PREGNANCY, SUPERVISION, HIGH-RISK: Status: ACTIVE | Noted: 2018-05-29

## 2018-05-29 PROBLEM — R74.8 ELEVATED LIVER ENZYMES: Status: RESOLVED | Noted: 2018-05-21 | Resolved: 2018-05-29

## 2018-05-29 PROBLEM — E55.9 VITAMIN D DEFICIENCY, UNSPECIFIED: Status: RESOLVED | Noted: 2018-05-21 | Resolved: 2018-05-29

## 2018-05-29 PROBLEM — N30.00 ACUTE CYSTITIS WITHOUT HEMATURIA: Status: RESOLVED | Noted: 2018-05-21 | Resolved: 2018-05-29

## 2018-05-29 PROBLEM — Z34.00 PREGNANCY, SUPERVISION, NORMAL, FIRST: Status: RESOLVED | Noted: 2018-04-26 | Resolved: 2018-05-29

## 2018-05-29 PROBLEM — I21.3 STEMI (ST ELEVATION MYOCARDIAL INFARCTION): Status: RESOLVED | Noted: 2018-05-17 | Resolved: 2018-05-29

## 2018-05-29 PROBLEM — R79.89 ELEVATED TROPONIN: Status: RESOLVED | Noted: 2018-05-21 | Resolved: 2018-05-29

## 2018-05-29 PROCEDURE — 99999 PR PBB SHADOW E&M-EST. PATIENT-LVL III: CPT | Mod: PBBFAC,,, | Performed by: OBSTETRICS & GYNECOLOGY

## 2018-05-29 PROCEDURE — 99213 OFFICE O/P EST LOW 20 MIN: CPT | Mod: PBBFAC,TH,PO | Performed by: OBSTETRICS & GYNECOLOGY

## 2018-05-29 PROCEDURE — 99213 OFFICE O/P EST LOW 20 MIN: CPT | Mod: TH,S$PBB,, | Performed by: OBSTETRICS & GYNECOLOGY

## 2018-05-29 RX ORDER — FAMOTIDINE 20 MG/1
20 TABLET, FILM COATED ORAL 2 TIMES DAILY
Qty: 60 TABLET | Refills: 11 | Status: ON HOLD | OUTPATIENT
Start: 2018-05-29 | End: 2018-09-18 | Stop reason: SDUPTHER

## 2018-05-29 RX ORDER — ONDANSETRON 4 MG/1
4 TABLET, FILM COATED ORAL EVERY 6 HOURS PRN
Qty: 60 TABLET | Refills: 1 | Status: SHIPPED | OUTPATIENT
Start: 2018-05-29 | End: 2018-07-30 | Stop reason: SDUPTHER

## 2018-05-29 RX ORDER — ONDANSETRON 4 MG/1
4 TABLET, FILM COATED ORAL EVERY 6 HOURS PRN
Qty: 60 TABLET | Refills: 1 | Status: SHIPPED | OUTPATIENT
Start: 2018-05-29 | End: 2018-05-29 | Stop reason: SDUPTHER

## 2018-05-29 RX ORDER — PROMETHAZINE HYDROCHLORIDE 25 MG/1
SUPPOSITORY RECTAL
COMMUNITY
Start: 2018-05-11 | End: 2018-05-29

## 2018-05-29 NOTE — PROGRESS NOTES
Chief Complaint   Patient presents with    Routine Prenatal Visit       27 y.o., at 19w2d by Estimated Date of Delivery: 10/21/18    Complaints today: Patient states she is doing well.  She states that her N/V has basically resolved apart from one isolated episode of emesis. She is feeling fetal movements at this time.     ROS  OBSTETRICS:   Contractions: N   Bleeding: N   Loss of fluid: N   Fetal movement: Y  GASTRO:   Nausea: N   Vomiting: N      OB History    Para Term  AB Living   1             SAB TAB Ectopic Multiple Live Births                  # Outcome Date GA Lbr Lan/2nd Weight Sex Delivery Anes PTL Lv   1 Current                   Dating reviewed  Allergies and problem list reviewed and updated  Medical and surgical history reviewed  Prenatal labs reviewed and updated    PHYSICAL EXAM  /68   Wt 70.2 kg (154 lb 12.2 oz)   LMP 2018   BMI 29.24 kg/m²     GENERAL: No acute distress  NEURO: Alert and oriented x3  PSYCH: Normal mood and affect  PULMONARY: Non-labored respiration  ABDomen: Soft, gravid, nontender    ASSESSMENT AND PLAN    pregnancy 2018 Problems (from 18 to present)     Problem Noted Resolved    Pregnancy, supervision, high-risk 2018 by Adeline Boothe MD No    Acute cystitis without hematuria 2018 by Juan Ruth MD 2018 by Adeline Boothe MD    Pregnancy, supervision, normal, first 2018 by Adeline Boothe MD 2018 by Adeline Boothe MD          Refilled Scripts for Zofran and Pantoprazole  Anatomy Scan Tomorrow     labor precautions given  Follow-up: 4 weeks

## 2018-05-30 ENCOUNTER — OFFICE VISIT (OUTPATIENT)
Dept: MATERNAL FETAL MEDICINE | Facility: CLINIC | Age: 28
End: 2018-05-30
Attending: OBSTETRICS & GYNECOLOGY
Payer: MEDICAID

## 2018-05-30 DIAGNOSIS — Z34.02 ENCOUNTER FOR SUPERVISION OF NORMAL FIRST PREGNANCY IN SECOND TRIMESTER: ICD-10-CM

## 2018-05-30 DIAGNOSIS — Z36.89 ENCOUNTER FOR FETAL ANATOMIC SURVEY: ICD-10-CM

## 2018-05-30 PROCEDURE — 99499 UNLISTED E&M SERVICE: CPT | Mod: S$PBB,,, | Performed by: OBSTETRICS & GYNECOLOGY

## 2018-05-30 PROCEDURE — 76805 OB US >/= 14 WKS SNGL FETUS: CPT | Mod: PBBFAC | Performed by: OBSTETRICS & GYNECOLOGY

## 2018-05-30 PROCEDURE — 76805 OB US >/= 14 WKS SNGL FETUS: CPT | Mod: 26,S$PBB,, | Performed by: OBSTETRICS & GYNECOLOGY

## 2018-05-30 NOTE — LETTER
May 30, 2018      Adeline Boothe MD  [address]  [address]  [place] [address]           [facility]  [address]  [place] [address]  Phone: [phone]          Patient: Daija Purvis   MR Number: [id number]   YOB: 1990   Date of Visit: 5/30/2018       Dear Dr. Adeline Boothe:    Thank you for referring Daija Purvis to me for evaluation. Attached you will find relevant portions of my assessment and plan of care.    If you have questions, please do not hesitate to call me. I look forward to following Daija Purvis along with you.    Sincerely,    Lupe Avila MD    Enclosure  CC:  No Recipients    If you would like to receive this communication electronically, please contact [email or web address] or [phone] to request more information on [employer] access.    For providers and/or their staff who would like to refer a patient to [facility], please contact us through our one-stop-shop provider referral line, [facility], at [phone].    If you feel you have received this communication in error or would no longer like to receive these types of communications, please e-mail [email or web address]

## 2018-05-30 NOTE — PROGRESS NOTES
"OB Ultrasound Report (see PDF version under imaging tab)    Indication  ========    Fetal anatomy survey.    Method  ======    Voluson E10, Transabdominal ultrasound examination. View: Suboptimal view: limited by fetal position.    Pregnancy  =========    Sterling pregnancy. Number of fetuses: 1.    Dating  ======    LMP on: 1/7/2018  Cycle: regular cycle  GA by LMP 20 w + 3 d  AUREA by LMP: 10/14/2018  GA by "stated dating" 19 w + 3 d  AUREA by "stated dating": 10/21/2018  Ultrasound examination on: 5/30/2018  GA by U/S based upon: AC, BPD, Femur, HC  GA by U/S 19 w + 0 d  AUREA by U/S: 10/24/2018  Assigned: Dating performed on 05/30/2018, based on the external assessment  Assigned GA 19 w + 3 d  Assigned AUREA: 10/21/2018    General Evaluation  ===============    Cardiac activity: present.  bpm.  Fetal movements: visualized.  Presentation: cephalic.  Placenta: anterior, right lateral.  Umbilical cord: placental insertion: normal, normal, 3 vessel cord.  Amniotic fluid: normal amount.    Fetal Biometry  ===========    Fetal Biometry  BPD 43.8 mm 19w 2d Hadlock  OFD 53.7 mm 19w 1d Tanya  .4 mm 18w 6d Hadlock  .3 mm 18w 6d Hadlock  Femur 29.5 mm 19w 1d Hadlock  Cerebellum tr 20.2 mm 20w 0d Ordonez  CM 3.3 mm  Nuchal fold 4.59 mm  Humerus 24.6 mm 17w 5d Tanya   g  Calculated by: Hadlock (BPD-HC-AC-FL)  EFW (lb) 0 lb  EFW (oz) 9 oz  Cephalic index 0.82  HC / AC 1.19  FL / BPD 0.67  FL / AC 0.22   bpm    Fetal Anatomy  ===========    Cranium: normal  Lateral ventricles: suboptimal  Choroid plexus: normal  Midline falx: suboptimal  Cavum septi pellucidi: suboptimal  Cerebellum: normal  Cisterna magna: normal  Rt lateral ventricle: suboptimal  Lt lateral ventricle: suboptimal  Rt choroid plexus: normal  Lt choroid plexus: normal  Neck: normal  Lips: normal  Profile: normal  Nose: normal  4-chamber view: normal  RVOT: normal  LVOT: normal  Situs: normal  Aortic arch: normal  Ductal " arch: normal  3-vessel-trachea view: normal  Cardiac position: normal  Cardiac axis: normal  Cardiac size: normal  Cardiac rhythm: normal  Rt lung: normal  Lt lung: normal  Diaphragm: normal  Cord insertion: normal  Stomach: normal  Bladder: normal  Abdom. wall: appears normal  Abdom. cavity: normal  Rt kidney: visualized  Lt kidney: visualized  Cervical spine: normal  Thoracic spine: normal  Lumbar spine: normal  Sacral spine: normal  Arms: normal  Legs: normal  Rt arm: normal  Lt arm: normal  Rt hand: present  Lt hand: present  Rt leg: normal  Lt leg: normal  Rt foot: normal  Lt foot: normal  Gender: female  Wants to know gender: yes    Maternal Structures  ===============    Uterus / Cervix  Uterus: Normal  Cervical length 32.2 mm  Ovaries / Tubes / Adnexa  Rt ovary: Visualized  Lt ovary: Visualized    Impression  =========    A [place] living IUP is identified. Fetal size is appropriate for established dating. No fetal structural malformations are identified;  however, the anatomic survey is incomplete as noted above. The patient will be scheduled for a f/u exam to complete the anatomic  survey. Cervical length is normal, and placental location is normal without evidence of previa.

## 2018-05-31 ENCOUNTER — PATIENT MESSAGE (OUTPATIENT)
Dept: OBSTETRICS AND GYNECOLOGY | Facility: CLINIC | Age: 28
End: 2018-05-31

## 2018-06-15 ENCOUNTER — PATIENT MESSAGE (OUTPATIENT)
Dept: OBSTETRICS AND GYNECOLOGY | Facility: CLINIC | Age: 28
End: 2018-06-15

## 2018-06-20 ENCOUNTER — ROUTINE PRENATAL (OUTPATIENT)
Dept: OBSTETRICS AND GYNECOLOGY | Facility: CLINIC | Age: 28
End: 2018-06-20
Payer: MEDICAID

## 2018-06-20 VITALS — SYSTOLIC BLOOD PRESSURE: 130 MMHG | WEIGHT: 155 LBS | DIASTOLIC BLOOD PRESSURE: 70 MMHG | BODY MASS INDEX: 29.28 KG/M2

## 2018-06-20 DIAGNOSIS — G44.209 ACUTE NON INTRACTABLE TENSION-TYPE HEADACHE: Primary | ICD-10-CM

## 2018-06-20 PROCEDURE — 99999 PR PBB SHADOW E&M-EST. PATIENT-LVL III: CPT | Mod: PBBFAC,,, | Performed by: ADVANCED PRACTICE MIDWIFE

## 2018-06-20 PROCEDURE — 99213 OFFICE O/P EST LOW 20 MIN: CPT | Mod: PBBFAC,PO | Performed by: ADVANCED PRACTICE MIDWIFE

## 2018-06-20 PROCEDURE — 99212 OFFICE O/P EST SF 10 MIN: CPT | Mod: TH,S$PBB,, | Performed by: ADVANCED PRACTICE MIDWIFE

## 2018-06-20 RX ORDER — BUTALBITAL, ACETAMINOPHEN AND CAFFEINE 50; 325; 40 MG/1; MG/1; MG/1
1 TABLET ORAL 3 TIMES DAILY PRN
Qty: 12 TABLET | Refills: 0 | Status: SHIPPED | OUTPATIENT
Start: 2018-06-20 | End: 2018-07-20

## 2018-06-22 NOTE — PROGRESS NOTES
Chief Complaint   Patient presents with    Routine Prenatal Visit    Headache       27 y.o., at 22w5d by Estimated Date of Delivery: 10/21/18    Complaints today: Reports headache- not relieved with Tylenol    ROS  OBSTETRICS:   Contractions None   Bleeding None   Loss of fluid None   Fetal mvmnt Reports mvmt  GASTRO:   Nausea None   Vomiting None      OB History    Para Term  AB Living   1             SAB TAB Ectopic Multiple Live Births                  # Outcome Date GA Lbr Lan/2nd Weight Sex Delivery Anes PTL Lv   1 Current                   Dating reviewed  Allergies and problem list reviewed and updated  Medical and surgical history reviewed  Prenatal labs reviewed and updated    PHYSICAL EXAM  /70   Wt 70.3 kg (154 lb 15.7 oz)   LMP 2018   BMI 29.28 kg/m²     GENERAL: No acute distress  NEURO: Alert and oriented x3  PSYCH: Normal mood and affect  PULMONARY: Non-labored respiration  ABDomen: Soft, gravid, nontender    ASSESSMENT AND PLAN    pregnancy 2018 Problems (from 18 to present)     Problem Noted Resolved    Pregnancy, supervision, high-risk 2018 by Adeline Boothe MD No    Acute cystitis without hematuria 2018 by Juan Ruth MD 2018 by Adeline Boothe MD    Pregnancy, supervision, normal, first 2018 by Adeline Boothe MD 2018 by Adeline Boothe MD          Discussed headache in pregnancy- pt denies visual disturbances, denies epigastric pain. Rx fioricet provided- advised if no relirf to come back into clinic.     labor precautions given  Follow-up: 4 weeks

## 2018-06-25 ENCOUNTER — PATIENT MESSAGE (OUTPATIENT)
Dept: OBSTETRICS AND GYNECOLOGY | Facility: CLINIC | Age: 28
End: 2018-06-25

## 2018-06-28 ENCOUNTER — OFFICE VISIT (OUTPATIENT)
Dept: MATERNAL FETAL MEDICINE | Facility: CLINIC | Age: 28
End: 2018-06-28
Attending: OBSTETRICS & GYNECOLOGY
Payer: MEDICAID

## 2018-06-28 DIAGNOSIS — O36.5920 POOR FETAL GROWTH AFFECTING MANAGEMENT OF MOTHER IN SECOND TRIMESTER, SINGLE OR UNSPECIFIED FETUS: ICD-10-CM

## 2018-06-28 DIAGNOSIS — Z36.89 ENCOUNTER FOR ULTRASOUND TO CHECK FETAL GROWTH: ICD-10-CM

## 2018-06-28 DIAGNOSIS — Z36.89 ENCOUNTER FOR ULTRASOUND TO CHECK FETAL GROWTH: Primary | ICD-10-CM

## 2018-06-28 PROCEDURE — 76816 OB US FOLLOW-UP PER FETUS: CPT | Mod: PBBFAC | Performed by: OBSTETRICS & GYNECOLOGY

## 2018-06-28 PROCEDURE — 76816 OB US FOLLOW-UP PER FETUS: CPT | Mod: 26,S$PBB,, | Performed by: OBSTETRICS & GYNECOLOGY

## 2018-06-28 PROCEDURE — 99214 OFFICE O/P EST MOD 30 MIN: CPT | Mod: 25,S$PBB,TH, | Performed by: OBSTETRICS & GYNECOLOGY

## 2018-06-28 NOTE — PROGRESS NOTES
"OB Ultrasound Report (see PDF version under imaging tab) and office visit note    Indication  ========    Follow-up evaluation of anatomy and growth.    Method  ======    Transabdominal ultrasound examination, Voluson E10. View: Good view.    Pregnancy  =========    [place]ling pregnancy. Number of fetuses: 1.    Dating  ======    LMP on: 1/7/2018  Cycle: regular cycle  GA by LMP 24 w + 4 d  AUREA by LMP: 10/14/2018  GA by "stated dating" 23 w + 4 d  AUREA by "stated dating": 10/21/2018  Ultrasound examination on: 6/28/2018  GA by U/S based upon: AC, BPD, Femur, HC  GA by U/S 21 w + 6 d  AUREA by U/S: 11/2/2018  Assigned: Dating performed on 05/30/2018, based on the external assessment  Assigned GA 23 w + 4 d  Assigned AUREA: 10/21/2018    General Evaluation  ===============    Cardiac activity: present.  bpm.  Fetal movements: visualized.  Presentation: breech.  Placenta: right lateral.  Umbilical cord: 3 vessel cord.  Amniotic fluid: normal amount.    Fetal Biometry  ===========    Fetal Biometry  BPD 53.8 mm 22w 3d Hadlock  OFD 65.3 mm 22w 1d Tanya  .3 mm 21w 4d Hadlock  .5 mm 22w 1d Hadlock  Femur 35.5 mm 21w 2d Hadlock  Humerus 32.6 mm 21w 0d Tanya   g 7% [place]  Calculated by: Hadlock (BPD-HC-AC-FL)  EFW (lb) 1 lb  EFW (oz) 0 oz  Cephalic index 0.82  HC / AC 1.13  FL / BPD 0.66  FL / AC 0.21   bpm  Head / Face / Neck   7.3 mm    Fetal Anatomy  ===========    Lateral ventricles: normal  Choroid plexus: normal  Midline falx: normal  Cavum septi pellucidi: normal  Cranium: brachycephaly  Profile: normal  4-chamber view: documented previously  Stomach: normal  Kidneys: normal  Bladder: normal  Gender: female  Wants to know gender: yes    Maternal Structures  ===============    Ovaries / Tubes / Adnexa  Rt ovary: Visualized  Lt ovary: Visualized    Consultation  ==========    Type: Abnormal Ultrasound Finding.  I spent 30 minutes on the consultation today with the patient regarding " findings from today's ultrasound exam. More than 50% of the  consultation was spent in face-to-face counseling and coordination of care.  Midtrimester-onset IUGR is diagnosed today. The patient's ob, medical, and surgical histories were reviewed and are uncomplicated.  She denies use of medication, tobacco, alcohol or any other illicit substances. Additionally, she denies any known history in her family  or that of her partner of birth defects, chromosomal/genetic disorders, or significant developmental delay.  While a fetal chromosome abnormality or congenital infection is possible, it is not considered highly likely given the lack of  sonographically visible major fetal abnormalities. However, further testing for chromosomal abnormalities and/or congenital infection is  reasonable and was offered. The option of genetic amniocentesis was discussed with the patient today. The patient is undecided at  this point about whether to undergo amniocentesis. She wishes to discuss this option with her family before making a decision.  Follow-up surveillance in light of the diagnosis of IUGR is as follows:  1. Weekly umbilical artery Doppler studies, beginning at 26 - 28 weeks  2. Twice weekly antepartum testing for fetal well-beginning at 28+ weeks  3. Reassessment of fetal growth every 3 weeks.    Impression  =========    There has been less than expected interval fetal growth with the EFW plotting at the 7th% today. No major fetal structural  malformations have been identified on the exam from 5/30 or the exam from today. However, two minor findings are noted:  brachycephaly and suspected abnormal rotation of the left renal pelvis/proximal ureter.  AFV is normal.

## 2018-07-17 ENCOUNTER — ROUTINE PRENATAL (OUTPATIENT)
Dept: OBSTETRICS AND GYNECOLOGY | Facility: CLINIC | Age: 28
End: 2018-07-17
Payer: MEDICAID

## 2018-07-17 VITALS — DIASTOLIC BLOOD PRESSURE: 66 MMHG | WEIGHT: 157 LBS | SYSTOLIC BLOOD PRESSURE: 96 MMHG | BODY MASS INDEX: 29.66 KG/M2

## 2018-07-17 DIAGNOSIS — O09.92 SUPERVISION OF HIGH RISK PREGNANCY IN SECOND TRIMESTER: Primary | ICD-10-CM

## 2018-07-17 DIAGNOSIS — O21.1 HYPEREMESIS GRAVIDARUM WITH DEHYDRATION: ICD-10-CM

## 2018-07-17 PROCEDURE — 82950 GLUCOSE TEST: CPT

## 2018-07-17 PROCEDURE — 86762 RUBELLA ANTIBODY: CPT

## 2018-07-17 PROCEDURE — 99212 OFFICE O/P EST SF 10 MIN: CPT | Mod: PBBFAC,TH,PO | Performed by: OBSTETRICS & GYNECOLOGY

## 2018-07-17 PROCEDURE — 99999 PR PBB SHADOW E&M-EST. PATIENT-LVL II: CPT | Mod: PBBFAC,,, | Performed by: OBSTETRICS & GYNECOLOGY

## 2018-07-17 PROCEDURE — 99213 OFFICE O/P EST LOW 20 MIN: CPT | Mod: TH,S$PBB,, | Performed by: OBSTETRICS & GYNECOLOGY

## 2018-07-17 PROCEDURE — 85025 COMPLETE CBC W/AUTO DIFF WBC: CPT

## 2018-07-17 NOTE — LETTER
July 17, 2018    Daija Purvis  [address]  [place] [address]         [facility]  [address]  [place] [address]  Phone: [phone] July 17, 2018     Patient: Daija Purvis   YOB: 1990   Date of Visit: 7/17/2018       To Whom It May Concern:    It is my medical opinion that Daija Purvis may return to light duty immediately with the following restrictions: she needs to sit down when necessary.  no lifting over 25 pounds..    If you have any questions or concerns, please don't hesitate to call.    Sincerely,        Adeline Boothe MD

## 2018-07-17 NOTE — PROGRESS NOTES
Complaints today: none  Good fm.  Denies ctx, vb, lof.  Centering session 2 today: Discussed common discomforts in pregnancy. Body changes in pregnancy, healthy teeth and gums  .    LMP 2018     27 y.o., at 26w2d by Estimated Date of Delivery: 10/21/18  Patient Active Problem List   Diagnosis    Hyperemesis gravidarum with dehydration    Severe malnutrition    Pregnancy, supervision, high-risk     OB History    Para Term  AB Living   1             SAB TAB Ectopic Multiple Live Births                  # Outcome Date GA Lbr Lan/2nd Weight Sex Delivery Anes PTL Lv   1 Current                   Dating reviewed    Allergies and problem list reviewed and updated    Medical and surgical history reviewed    Prenatal labs reviewed and updated    Physical Exam:  ABD: soft, gravid, nontender,     Assessment:  Diagnoses and all orders for this visit:    Supervision of high risk pregnancy in second trimester  -     Rubella antibody, IgG  -     CBC auto differential  -     OB Glucose Screen    Hyperemesis gravidarum with dehydration    Other orders  -     Cancel: CBC auto differential; Future  -     Cancel: OB Glucose Screen; Future         Plan:      follow up 4 Weeks, bleeding/pain precautions kick counts, labor precautions

## 2018-07-18 DIAGNOSIS — O09.92 SUPERVISION OF HIGH RISK PREGNANCY IN SECOND TRIMESTER: Primary | ICD-10-CM

## 2018-07-18 LAB
BASOPHILS # BLD AUTO: 0.01 K/UL
BASOPHILS NFR BLD: 0.2 %
DIFFERENTIAL METHOD: ABNORMAL
EOSINOPHIL # BLD AUTO: 0 K/UL
EOSINOPHIL NFR BLD: 0.4 %
ERYTHROCYTE [DISTWIDTH] IN BLOOD BY AUTOMATED COUNT: 14 %
GLUCOSE SERPL-MCNC: 83 MG/DL
HCT VFR BLD AUTO: 35.2 %
HGB BLD-MCNC: 11.1 G/DL
IMM GRANULOCYTES # BLD AUTO: 0.02 K/UL
IMM GRANULOCYTES NFR BLD AUTO: 0.4 %
LYMPHOCYTES # BLD AUTO: 1.5 K/UL
LYMPHOCYTES NFR BLD: 26.9 %
MCH RBC QN AUTO: 31.4 PG
MCHC RBC AUTO-ENTMCNC: 31.5 G/DL
MCV RBC AUTO: 100 FL
MONOCYTES # BLD AUTO: 0.4 K/UL
MONOCYTES NFR BLD: 6.3 %
NEUTROPHILS # BLD AUTO: 3.7 K/UL
NEUTROPHILS NFR BLD: 65.8 %
NRBC BLD-RTO: 0 /100 WBC
PLATELET # BLD AUTO: 221 K/UL
PMV BLD AUTO: 11.6 FL
RBC # BLD AUTO: 3.53 M/UL
WBC # BLD AUTO: 5.57 K/UL

## 2018-07-19 ENCOUNTER — OFFICE VISIT (OUTPATIENT)
Dept: MATERNAL FETAL MEDICINE | Facility: CLINIC | Age: 28
End: 2018-07-19
Attending: OBSTETRICS & GYNECOLOGY
Payer: MEDICAID

## 2018-07-19 VITALS
SYSTOLIC BLOOD PRESSURE: 98 MMHG | BODY MASS INDEX: 29.78 KG/M2 | DIASTOLIC BLOOD PRESSURE: 64 MMHG | HEIGHT: 61 IN | WEIGHT: 157.75 LBS

## 2018-07-19 DIAGNOSIS — Z36.89 ENCOUNTER FOR ULTRASOUND TO CHECK FETAL GROWTH: Primary | ICD-10-CM

## 2018-07-19 DIAGNOSIS — O36.5920 POOR FETAL GROWTH AFFECTING MANAGEMENT OF MOTHER IN SECOND TRIMESTER, SINGLE OR UNSPECIFIED FETUS: ICD-10-CM

## 2018-07-19 DIAGNOSIS — Z36.89 ENCOUNTER FOR ULTRASOUND TO CHECK FETAL GROWTH: ICD-10-CM

## 2018-07-19 LAB
RUBV IGG SER-ACNC: 26.9 IU/ML
RUBV IGG SER-IMP: REACTIVE

## 2018-07-19 PROCEDURE — 76820 UMBILICAL ARTERY ECHO: CPT | Mod: 26,S$PBB,, | Performed by: OBSTETRICS & GYNECOLOGY

## 2018-07-19 PROCEDURE — 76820 UMBILICAL ARTERY ECHO: CPT | Mod: PBBFAC | Performed by: OBSTETRICS & GYNECOLOGY

## 2018-07-19 PROCEDURE — 99213 OFFICE O/P EST LOW 20 MIN: CPT | Mod: 25,S$PBB,TH, | Performed by: OBSTETRICS & GYNECOLOGY

## 2018-07-19 PROCEDURE — 99213 OFFICE O/P EST LOW 20 MIN: CPT | Mod: PBBFAC,TH

## 2018-07-19 PROCEDURE — 76816 OB US FOLLOW-UP PER FETUS: CPT | Mod: 26,S$PBB,, | Performed by: OBSTETRICS & GYNECOLOGY

## 2018-07-19 PROCEDURE — 76816 OB US FOLLOW-UP PER FETUS: CPT | Mod: PBBFAC | Performed by: OBSTETRICS & GYNECOLOGY

## 2018-07-19 PROCEDURE — 99999 PR PBB SHADOW E&M-EST. PATIENT-LVL III: CPT | Mod: PBBFAC,,,

## 2018-07-19 NOTE — PROGRESS NOTES
"OB Ultrasound Report (see PDF version under imaging tab) and office visit note    Indication  ========    F/U Consultation. Evaluation of fetal growth. IUGR.    Maternal Assessment  ================    Weight 72 kg  Weight (lb) 159 lb    Method  ======    Transabdominal ultrasound examination. View: Sufficient.    Pregnancy  =========    Sterling pregnancy. Number of fetuses: 1.    Dating  ======    LMP on: 1/7/2018  Cycle: regular cycle  GA by LMP 27 w + 4 d  AUREA by LMP: 10/14/2018  GA by "stated dating" 26 w + 4 d  AUREA by "stated dating": 10/21/2018  Ultrasound examination on: 7/19/2018  GA by U/S based upon: AC, BPD, Femur, HC  GA by U/S 25 w + 0 d  AUREA by U/S: 11/1/2018  Assigned: Dating performed on 05/30/2018, based on the external assessment  Assigned GA 26 w + 4 d  Assigned AUREA: 10/21/2018    General Evaluation  ===============    Cardiac activity: present.  bpm.  Fetal movements: visualized.  Presentation: cephalic.  Placenta: posterior.  Umbilical cord: 3 vessel cord.  Amniotic fluid: MVP 5.0 cm.    Fetal Biometry  ===========    Fetal Biometry  BPD 64.1 mm 25w 6d Hadlock  OFD 78.0 mm 25w 4d Tanya  .1 mm 24w 6d Hadlock  .6 mm 25w 0d Hadlock  Femur 43.3 mm 24w 1d Hadlock   g 15% [place]  Calculated by: Hadlock (BPD-HC-AC-FL)  EFW (lb) 1 lb  EFW (oz) 10 oz  Cephalic index 0.82  HC / AC 1.12  FL / BPD 0.68  FL / AC 0.21  MVP 5.0 cm   bpm  Head / Face / Neck   6.2 mm    Fetal Anatomy  ===========    Cranium: brachycephaly  4-chamber view: normal  Stomach: normal  Kidneys: normal  Bladder: visualized  Gender: female  Wants to know gender: yes  Other: A full anatomy survey previously performed.    Fetal Doppler  ===========    Umbilical Cord  Umbilical A S / D 3.41 61% [place]  Umbilical A  bpm    Impression and office visit note  =========    The fetal biometric parameters lag EGA by 12 - 17 days, but the overall EFW plots at the 15th percentile. AFV is normal, and " UA  doppler S/D ratios are normal. 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    Findings from today's exam and findings from the prior studies discussed with the patient today. After her last visit, she discussed the  option of genetic amniocentesis with her boyfriend. She and he decided to decline amniocentesis.  Plans for f/u fetal growth assessment reviewed also.  Time spent in face to face discusssion and coordination of care was 15 minutes.    Recommendation  ==============    1. F/U growth exam in 3 weeks (scheduled by JERRY).

## 2018-07-20 ENCOUNTER — PATIENT MESSAGE (OUTPATIENT)
Dept: OBSTETRICS AND GYNECOLOGY | Facility: CLINIC | Age: 28
End: 2018-07-20

## 2018-07-30 ENCOUNTER — PATIENT MESSAGE (OUTPATIENT)
Dept: OBSTETRICS AND GYNECOLOGY | Facility: CLINIC | Age: 28
End: 2018-07-30

## 2018-07-30 RX ORDER — ONDANSETRON 4 MG/1
4 TABLET, FILM COATED ORAL EVERY 6 HOURS PRN
Qty: 60 TABLET | Refills: 1 | Status: ON HOLD | OUTPATIENT
Start: 2018-07-30 | End: 2018-10-01 | Stop reason: HOSPADM

## 2018-08-09 ENCOUNTER — PATIENT MESSAGE (OUTPATIENT)
Dept: OBSTETRICS AND GYNECOLOGY | Facility: CLINIC | Age: 28
End: 2018-08-09

## 2018-08-09 ENCOUNTER — TELEPHONE (OUTPATIENT)
Dept: MATERNAL FETAL MEDICINE | Facility: CLINIC | Age: 28
End: 2018-08-09

## 2018-08-09 NOTE — TELEPHONE ENCOUNTER
Received [employer] patient message that patient's appointment time no longer worked for her.    Patient lost her ID and states she cannot make her appointment.    Patient rescheduled for Thursday August 16th at 7:40 am.    Patient verbalized understanding of information received.

## 2018-08-14 ENCOUNTER — TELEPHONE (OUTPATIENT)
Dept: OBSTETRICS AND GYNECOLOGY | Facility: CLINIC | Age: 28
End: 2018-08-14

## 2018-08-24 DIAGNOSIS — O35.9XX0 FETAL ABNORMALITY AFFECTING MANAGEMENT OF MOTHER, SINGLE OR UNSPECIFIED FETUS: Primary | ICD-10-CM

## 2018-08-30 ENCOUNTER — TELEPHONE (OUTPATIENT)
Dept: OBSTETRICS AND GYNECOLOGY | Facility: CLINIC | Age: 28
End: 2018-08-30

## 2018-08-30 NOTE — TELEPHONE ENCOUNTER
----- Message from Faviola Lisa sent at 8/30/2018  7:49 AM CDT -----  Contact: [facility]  Pt missed last 3 appts at [facility].

## 2018-08-30 NOTE — TELEPHONE ENCOUNTER
Spoke to pt she states she work at night and overslept her appointment but will call and reschedule

## 2018-09-08 ENCOUNTER — HOSPITAL ENCOUNTER (EMERGENCY)
Facility: OTHER | Age: 28
Discharge: HOME | End: 2018-09-08
Attending: OBSTETRICS & GYNECOLOGY
Payer: MEDICAID

## 2018-09-08 VITALS
SYSTOLIC BLOOD PRESSURE: 118 MMHG | WEIGHT: 158 LBS | BODY MASS INDEX: 29.83 KG/M2 | DIASTOLIC BLOOD PRESSURE: 70 MMHG | HEART RATE: 74 BPM | HEIGHT: 61 IN | TEMPERATURE: 98 F | OXYGEN SATURATION: 98 % | RESPIRATION RATE: 20 BRPM

## 2018-09-08 DIAGNOSIS — O21.0 HYPEREMESIS GRAVIDARUM: ICD-10-CM

## 2018-09-08 DIAGNOSIS — O21.9 NAUSEA AND VOMITING DURING PREGNANCY: Primary | ICD-10-CM

## 2018-09-08 DIAGNOSIS — O9A.219 TRAUMA DURING PREGNANCY: ICD-10-CM

## 2018-09-08 DIAGNOSIS — Z3A.33 33 WEEKS GESTATION OF PREGNANCY: ICD-10-CM

## 2018-09-08 LAB
ALBUMIN SERPL BCP-MCNC: 3.6 G/DL
ALP SERPL-CCNC: 141 U/L
ALT SERPL W/O P-5'-P-CCNC: 26 U/L
ANION GAP SERPL CALC-SCNC: 13 MMOL/L
AST SERPL-CCNC: 33 U/L
BILIRUB SERPL-MCNC: 0.6 MG/DL
BILIRUB UR QL STRIP: NEGATIVE
BUN SERPL-MCNC: 12 MG/DL
CALCIUM SERPL-MCNC: 10.2 MG/DL
CHLORIDE SERPL-SCNC: 104 MMOL/L
CLARITY UR: CLEAR
CO2 SERPL-SCNC: 16 MMOL/L
COLOR UR: YELLOW
CREAT SERPL-MCNC: 0.6 MG/DL
EST. GFR  (AFRICAN AMERICAN): >60 ML/MIN/1.73 M^2
EST. GFR  (NON AFRICAN AMERICAN): >60 ML/MIN/1.73 M^2
GLUCOSE SERPL-MCNC: 55 MG/DL
GLUCOSE UR QL STRIP: NEGATIVE
HGB UR QL STRIP: NEGATIVE
KETONES UR QL STRIP: ABNORMAL
LEUKOCYTE ESTERASE UR QL STRIP: NEGATIVE
NITRITE UR QL STRIP: NEGATIVE
PH UR STRIP: 6 [PH] (ref 5–8)
POTASSIUM SERPL-SCNC: 4.1 MMOL/L
PROT SERPL-MCNC: 8.2 G/DL
PROT UR QL STRIP: ABNORMAL
SODIUM SERPL-SCNC: 133 MMOL/L
SP GR UR STRIP: >=1.03 (ref 1–1.03)
URN SPEC COLLECT METH UR: ABNORMAL
UROBILINOGEN UR STRIP-ACNC: 1 EU/DL

## 2018-09-08 PROCEDURE — 96374 THER/PROPH/DIAG INJ IV PUSH: CPT

## 2018-09-08 PROCEDURE — 25000003 PHARM REV CODE 250: Performed by: STUDENT IN AN ORGANIZED HEALTH CARE EDUCATION/TRAINING PROGRAM

## 2018-09-08 PROCEDURE — 81003 URINALYSIS AUTO W/O SCOPE: CPT

## 2018-09-08 PROCEDURE — 80053 COMPREHEN METABOLIC PANEL: CPT

## 2018-09-08 PROCEDURE — 99284 EMERGENCY DEPT VISIT MOD MDM: CPT | Mod: 25,,, | Performed by: OBSTETRICS & GYNECOLOGY

## 2018-09-08 PROCEDURE — 99284 EMERGENCY DEPT VISIT MOD MDM: CPT | Mod: 25

## 2018-09-08 PROCEDURE — 63600175 PHARM REV CODE 636 W HCPCS: Performed by: STUDENT IN AN ORGANIZED HEALTH CARE EDUCATION/TRAINING PROGRAM

## 2018-09-08 PROCEDURE — 59025 FETAL NON-STRESS TEST: CPT | Mod: 26,,, | Performed by: OBSTETRICS & GYNECOLOGY

## 2018-09-08 PROCEDURE — 59025 FETAL NON-STRESS TEST: CPT

## 2018-09-08 RX ORDER — ACETAMINOPHEN 500 MG
1000 TABLET ORAL EVERY 6 HOURS PRN
Status: DISCONTINUED | OUTPATIENT
Start: 2018-09-08 | End: 2018-09-08

## 2018-09-08 RX ORDER — HYDROMORPHONE HYDROCHLORIDE 2 MG/ML
0.5 INJECTION, SOLUTION INTRAMUSCULAR; INTRAVENOUS; SUBCUTANEOUS
Status: DISCONTINUED | OUTPATIENT
Start: 2018-09-08 | End: 2018-09-09 | Stop reason: HOSPADM

## 2018-09-08 RX ORDER — ACETAMINOPHEN 500 MG
1000 TABLET ORAL EVERY 6 HOURS PRN
Status: DISCONTINUED | OUTPATIENT
Start: 2018-09-08 | End: 2018-09-09 | Stop reason: HOSPADM

## 2018-09-08 RX ORDER — METOCLOPRAMIDE HYDROCHLORIDE 5 MG/ML
10 INJECTION INTRAMUSCULAR; INTRAVENOUS ONCE
Status: COMPLETED | OUTPATIENT
Start: 2018-09-08 | End: 2018-09-08

## 2018-09-08 RX ORDER — SODIUM CHLORIDE, SODIUM LACTATE, POTASSIUM CHLORIDE, CALCIUM CHLORIDE 600; 310; 30; 20 MG/100ML; MG/100ML; MG/100ML; MG/100ML
INJECTION, SOLUTION INTRAVENOUS CONTINUOUS
Status: DISCONTINUED | OUTPATIENT
Start: 2018-09-08 | End: 2018-09-08

## 2018-09-08 RX ADMIN — METOCLOPRAMIDE 10 MG: 5 INJECTION, SOLUTION INTRAMUSCULAR; INTRAVENOUS at 08:09

## 2018-09-08 RX ADMIN — ACETAMINOPHEN 1000 MG: 500 TABLET, FILM COATED ORAL at 08:09

## 2018-09-08 RX ADMIN — DEXTROSE, SODIUM CHLORIDE, SODIUM LACTATE, POTASSIUM CHLORIDE, AND CALCIUM CHLORIDE 500 ML: 5; .6; .31; .03; .02 INJECTION, SOLUTION INTRAVENOUS at 08:09

## 2018-09-09 NOTE — ED PROVIDER NOTES
Encounter Date: 2018       History     Chief Complaint   Patient presents with    Back Pain     s/p slipped and fell this am; pt is 8 motnhs pregnant; also states vomiting for past 4 days     HPI   Daija Purvis is a 28 y.o. F at 33w6d presents s/p fall this AM. Patient states at approx 0800, she was getting up to use the bathroom when she slipped and fell backwards onto her back and buttock onto her bed. Patient denies any LOC or dizziness however felt lightheadedness. No direct trauma to her abdomen.She has had hyperemesis gravidarum her entire pregnancy and has been experiencing nausea and vomiting over the past 4 days. She has not been able to tolerate any PO intake during this period. She usually takes phenergan and chlorpromazine however, states these medications have not helped with her symptoms recently.      This IUP is complicated by hyperemesis gravidarum in current pregnancy.  Patient denies contractions, denies vaginal bleeding, denies LOF.   Fetal Movement: normal.     Review of patient's allergies indicates:   Allergen Reactions    Codeine      History reviewed. No pertinent past medical history.  No past surgical history on file.  Family History   Problem Relation Age of Onset    Colon cancer Neg Hx     Ovarian cancer Neg Hx      Social History     Tobacco Use    Smoking status: Former Smoker     Types: Cigarettes    Smokeless tobacco: Never Used   Substance Use Topics    Alcohol use: No    Drug use: No     Review of Systems   Constitutional: Negative for chills, fatigue and fever.   HENT: Negative for congestion, sinus pain, sneezing and sore throat.    Eyes: Negative for photophobia and visual disturbance.   Respiratory: Negative for cough, chest tightness and shortness of breath.    Cardiovascular: Negative for chest pain, palpitations and leg swelling.   Gastrointestinal: Positive for nausea and vomiting. Negative for constipation and diarrhea.   Genitourinary: Negative for  dysuria, pelvic pain, vaginal bleeding and vaginal discharge.   Musculoskeletal: Negative for arthralgias, gait problem and joint swelling.   Neurological: Positive for syncope and light-headedness. Negative for dizziness and headaches.   Psychiatric/Behavioral: Negative for self-injury, sleep disturbance and suicidal ideas. The patient is not nervous/anxious.        Physical Exam     Initial Vitals [09/08/18 1846]   BP Pulse Resp Temp SpO2   122/73 100 20 98.9 °F (37.2 °C) 98 %      MAP       --         Physical Exam    Vitals reviewed.  Constitutional: She appears well-developed and well-nourished. She is not diaphoretic. No distress.   HENT:   Head: Normocephalic and atraumatic.   Nose: Nose normal.   Cardiovascular: Normal rate, regular rhythm, normal heart sounds and intact distal pulses.   Pulmonary/Chest: Breath sounds normal. No respiratory distress. She exhibits no tenderness.   Abdominal: Soft. She exhibits no distension. There is no tenderness. There is no rebound.   Gravid abdomen, size < dates   Neurological: She is alert and oriented to person, place, and time. She has normal strength. No cranial nerve deficit or sensory deficit.   Skin: Skin is warm and dry. Capillary refill takes less than 2 seconds.   Psychiatric: She has a normal mood and affect. Her behavior is normal. Judgment and thought content normal.     OB LABOR EXAM:   Pre-Term Labor: No.   Membranes ruptured: No.   Method: Sterile vaginal exam per MD.   Vaginal Bleeding: none present.   Engagement: ballotable/floating.   Dilatation: 1.   Station: -3.   Effacement: 60%.   Amniotic Fluid Color: no fluid.   Amniotic Fluid Amount: none noted.   Comments: Re-check: unchanged       ED Course   Obtain Fetal nonstress test (NST)  Date/Time: 9/8/2018 10:14 PM  Performed by: Alessia Jorge MD  Authorized by: Alessia Jorge MD     Nonstress Test:     Variability:  6-25 BPM    Decelerations:  None    Accelerations:  15 bpm    Acoustic Stimulator:  No      Baseline:  140    Uterine Irritability: No      Contractions:  Irregular  Biophysical Profile:     Nonstress Test Interpretation: reactive      Overall Impression:  Reassuring        Labs Reviewed   COMPREHENSIVE METABOLIC PANEL - Abnormal; Notable for the following components:       Result Value    Sodium 133 (*)     CO2 16 (*)     Glucose 55 (*)     Alkaline Phosphatase 141 (*)     All other components within normal limits   URINALYSIS, REFLEX TO URINE CULTURE - Abnormal; Notable for the following components:    Specific Gravity, UA >=1.030 (*)     Protein, UA Trace (*)     Ketones, UA 3+ (*)     All other components within normal limits    Narrative:     Preferred Collection Type->Urine, Clean Catch          Imaging Results    None          Medical Decision Making:   History:   Old Records Summarized: records from clinic visits and records from previous admission(s).  ED Management:  Patient evaluated, in stable condition. No e/o abruption/PTL/ROM >12 hours out from fall. Continued soreness from falling - given tylenol and heat packs with some relief.   Udip: trace leuks, +3 ketones.  NST: 140, reactive/reassuring. CTX: irregular - patient not feeling  SVE: 60/-3, recheck: unchanged at 1 hour.   Patient given 1L of 5% dextrose LR given dehydration and metoclopramide for nausea. CMP and urine culture collected.  Patient's symptoms improved, able to tolerate tylenol PO and crackers. CMP: WNL.  Patient discharged home on labor precautions and instructed on symptom management for back pain. Patient verbalized understanding, all questions answered. Will follow up urine culture    Patient was measuring IUGR @ 26wk and it was recommended that she start  testing, UA dopplers and serial growth US. The patient has missed all of these appointments due to working nights and she can't make it to clinic in AM. I counseled her that she can make her appts in the afternoon - she agreed. I stressed the  importance of getting testing for small growing fetus as it is at increased risk of stillbirth and she stated understanding. Alyse brunner discussed and explained that she must return if she has diminished fetal movement.               Attending Attestation:   Physician Attestation Statement for Resident:  As the supervising MD   Physician Attestation Statement: I have personally seen and examined this patient.   I agree with the above history. -:   As the supervising MD I agree with the above PE.    As the supervising MD I agree with the above treatment, course, plan, and disposition.   -:     I personally counseled patient regarding IUGR and need for follow up.      I was personally present during the entire procedure.  I have reviewed and agree with the residents interpretation of the following: rhythm strips.  I have reviewed the following: old records at this facility.                       Clinical Impression:   The primary encounter diagnosis was Nausea and vomiting during pregnancy. Diagnoses of 33 weeks gestation of pregnancy, Hyperemesis gravidarum, and Trauma during pregnancy were also pertinent to this visit.                             Alessia Jorge MD  Resident  09/08/18 [id number]       Essence Holman MD  09/09/18 [id number]

## 2018-09-09 NOTE — PROGRESS NOTES
Pt presents to [facility] for back pain after falling on back this morning. Pt denies LOF, VB, and reports +FM. Problems this pregnancy with N/V, pt states hasn't kept anything down for 4 days. Urine dip abnormal with +3 ketones, trace leuks, trace protein. Luisito in department, and notified of pt arrival and complaints.

## 2018-09-13 ENCOUNTER — PATIENT MESSAGE (OUTPATIENT)
Dept: OBSTETRICS AND GYNECOLOGY | Facility: CLINIC | Age: 28
End: 2018-09-13

## 2018-09-13 ENCOUNTER — HOSPITAL ENCOUNTER (INPATIENT)
Facility: OTHER | Age: 28
LOS: 5 days | Discharge: HOME | End: 2018-09-18
Attending: OBSTETRICS & GYNECOLOGY | Admitting: OBSTETRICS & GYNECOLOGY
Payer: MEDICAID

## 2018-09-13 DIAGNOSIS — O21.0 HYPEREMESIS GRAVIDARUM: ICD-10-CM

## 2018-09-13 DIAGNOSIS — E43 SEVERE MALNUTRITION: ICD-10-CM

## 2018-09-13 DIAGNOSIS — I49.3 PVCS (PREMATURE VENTRICULAR CONTRACTIONS): ICD-10-CM

## 2018-09-13 DIAGNOSIS — A59.9 TRICHOMONAL INFECTION: ICD-10-CM

## 2018-09-13 DIAGNOSIS — I49.9 IRREGULAR HEART BEAT: ICD-10-CM

## 2018-09-13 DIAGNOSIS — O09.90 SUPERVISION OF HIGH RISK PREGNANCY, ANTEPARTUM: ICD-10-CM

## 2018-09-13 DIAGNOSIS — A59.9 TRICHOMONIASIS: ICD-10-CM

## 2018-09-13 DIAGNOSIS — O21.1 HYPEREMESIS GRAVIDARUM WITH DEHYDRATION: Primary | ICD-10-CM

## 2018-09-13 DIAGNOSIS — I49.8 VENTRICULAR BIGEMINY: ICD-10-CM

## 2018-09-13 DIAGNOSIS — O21.9 NAUSEA/VOMITING IN PREGNANCY: ICD-10-CM

## 2018-09-13 DIAGNOSIS — Z3A.34 PREGNANCY WITH 34 COMPLETED WEEKS GESTATION: ICD-10-CM

## 2018-09-13 DIAGNOSIS — R94.31 EKG ABNORMALITIES: ICD-10-CM

## 2018-09-13 LAB
ALBUMIN SERPL BCP-MCNC: 3.5 G/DL
ALP SERPL-CCNC: 148 U/L
ALT SERPL W/O P-5'-P-CCNC: 64 U/L
ANION GAP SERPL CALC-SCNC: 14 MMOL/L
AST SERPL-CCNC: 47 U/L
BACTERIA #/AREA URNS HPF: ABNORMAL /HPF
BASOPHILS # BLD AUTO: 0.01 K/UL
BASOPHILS NFR BLD: 0.1 %
BILIRUB SERPL-MCNC: 0.6 MG/DL
BILIRUB SERPL-MCNC: NORMAL MG/DL
BILIRUB UR QL STRIP: ABNORMAL
BLOOD URINE, POC: 250
BUN SERPL-MCNC: 13 MG/DL
CALCIUM SERPL-MCNC: 9.9 MG/DL
CHLORIDE SERPL-SCNC: 102 MMOL/L
CLARITY UR: ABNORMAL
CO2 SERPL-SCNC: 18 MMOL/L
COLOR UR: YELLOW
COLOR, POC UA: NORMAL
CREAT SERPL-MCNC: 0.7 MG/DL
DIFFERENTIAL METHOD: ABNORMAL
EOSINOPHIL # BLD AUTO: 0 K/UL
EOSINOPHIL NFR BLD: 0.1 %
ERYTHROCYTE [DISTWIDTH] IN BLOOD BY AUTOMATED COUNT: 13.6 %
EST. GFR  (AFRICAN AMERICAN): >60 ML/MIN/1.73 M^2
EST. GFR  (NON AFRICAN AMERICAN): >60 ML/MIN/1.73 M^2
GLUCOSE SERPL-MCNC: 65 MG/DL
GLUCOSE UR QL STRIP: NEGATIVE
GLUCOSE UR QL STRIP: NORMAL
HCT VFR BLD AUTO: 37 %
HGB BLD-MCNC: 12.9 G/DL
HGB UR QL STRIP: ABNORMAL
HYALINE CASTS #/AREA URNS LPF: 0 /LPF
KETONES UR QL STRIP: ABNORMAL
KETONES UR QL STRIP: NORMAL
LEUKOCYTE ESTERASE UR QL STRIP: ABNORMAL
LEUKOCYTE ESTERASE URINE, POC: NORMAL
LYMPHOCYTES # BLD AUTO: 1.9 K/UL
LYMPHOCYTES NFR BLD: 26.6 %
MCH RBC QN AUTO: 31.9 PG
MCHC RBC AUTO-ENTMCNC: 34.9 G/DL
MCV RBC AUTO: 91 FL
MICROSCOPIC COMMENT: ABNORMAL
MONOCYTES # BLD AUTO: 0.6 K/UL
MONOCYTES NFR BLD: 7.9 %
NEUTROPHILS # BLD AUTO: 4.7 K/UL
NEUTROPHILS NFR BLD: 65 %
NITRITE UR QL STRIP: NEGATIVE
NITRITE, POC UA: NORMAL
PH UR STRIP: 6 [PH] (ref 5–8)
PH, POC UA: NORMAL
PLATELET # BLD AUTO: 183 K/UL
PMV BLD AUTO: 12.6 FL
POTASSIUM SERPL-SCNC: 3.9 MMOL/L
PROT SERPL-MCNC: 7.6 G/DL
PROT UR QL STRIP: ABNORMAL
PROTEIN, POC: NORMAL
RBC # BLD AUTO: 4.05 M/UL
RBC #/AREA URNS HPF: 5 /HPF (ref 0–4)
SODIUM SERPL-SCNC: 134 MMOL/L
SP GR UR STRIP: 1.02 (ref 1–1.03)
SPECIFIC GRAVITY, POC UA: NORMAL
SQUAMOUS #/AREA URNS HPF: 6 /HPF
TRICHOMONAS UR QL MICRO: ABNORMAL
URN SPEC COLLECT METH UR: ABNORMAL
UROBILINOGEN UR STRIP-ACNC: 1 EU/DL
UROBILINOGEN, POC UA: NORMAL
WBC # BLD AUTO: 7.22 K/UL
WBC #/AREA URNS HPF: >100 /HPF (ref 0–5)

## 2018-09-13 PROCEDURE — 59025 FETAL NON-STRESS TEST: CPT

## 2018-09-13 PROCEDURE — 81000 URINALYSIS NONAUTO W/SCOPE: CPT

## 2018-09-13 PROCEDURE — 93005 ELECTROCARDIOGRAM TRACING: CPT

## 2018-09-13 PROCEDURE — 84484 ASSAY OF TROPONIN QUANT: CPT

## 2018-09-13 PROCEDURE — 86703 HIV-1/HIV-2 1 RESULT ANTBDY: CPT

## 2018-09-13 PROCEDURE — P9612 CATHETERIZE FOR URINE SPEC: HCPCS

## 2018-09-13 PROCEDURE — 86850 RBC ANTIBODY SCREEN: CPT

## 2018-09-13 PROCEDURE — 81002 URINALYSIS NONAUTO W/O SCOPE: CPT

## 2018-09-13 PROCEDURE — 83690 ASSAY OF LIPASE: CPT

## 2018-09-13 PROCEDURE — 96374 THER/PROPH/DIAG INJ IV PUSH: CPT | Mod: 59

## 2018-09-13 PROCEDURE — 80053 COMPREHEN METABOLIC PANEL: CPT

## 2018-09-13 PROCEDURE — 99285 EMERGENCY DEPT VISIT HI MDM: CPT | Mod: 25

## 2018-09-13 PROCEDURE — 82150 ASSAY OF AMYLASE: CPT

## 2018-09-13 PROCEDURE — 63600175 PHARM REV CODE 636 W HCPCS: Performed by: STUDENT IN AN ORGANIZED HEALTH CARE EDUCATION/TRAINING PROGRAM

## 2018-09-13 PROCEDURE — 93010 ELECTROCARDIOGRAM REPORT: CPT | Mod: ,,, | Performed by: INTERNAL MEDICINE

## 2018-09-13 PROCEDURE — 11000001 HC ACUTE MED/SURG PRIVATE ROOM

## 2018-09-13 PROCEDURE — 86592 SYPHILIS TEST NON-TREP QUAL: CPT

## 2018-09-13 PROCEDURE — 85025 COMPLETE CBC W/AUTO DIFF WBC: CPT

## 2018-09-13 RX ORDER — ONDANSETRON 2 MG/ML
8 INJECTION INTRAMUSCULAR; INTRAVENOUS ONCE
Status: COMPLETED | OUTPATIENT
Start: 2018-09-13 | End: 2018-09-13

## 2018-09-13 RX ADMIN — ONDANSETRON HYDROCHLORIDE 8 MG: 2 INJECTION INTRAMUSCULAR; INTRAVENOUS at 11:09

## 2018-09-13 RX ADMIN — DEXTROSE, SODIUM CHLORIDE, SODIUM LACTATE, POTASSIUM CHLORIDE, AND CALCIUM CHLORIDE 1000 ML: 5; .6; .31; .03; .02 INJECTION, SOLUTION INTRAVENOUS at 11:09

## 2018-09-14 ENCOUNTER — ANESTHESIA (OUTPATIENT)
Dept: OBSTETRICS AND GYNECOLOGY | Facility: OTHER | Age: 28
End: 2018-09-14

## 2018-09-14 ENCOUNTER — ANESTHESIA EVENT (OUTPATIENT)
Dept: OBSTETRICS AND GYNECOLOGY | Facility: OTHER | Age: 28
End: 2018-09-14

## 2018-09-14 PROBLEM — I49.9 IRREGULAR HEART BEAT: Status: ACTIVE | Noted: 2018-09-14

## 2018-09-14 PROBLEM — I49.8 VENTRICULAR BIGEMINY: Status: ACTIVE | Noted: 2018-09-14

## 2018-09-14 LAB
ABO + RH BLD: NORMAL
ALBUMIN SERPL BCP-MCNC: 3 G/DL
ALP SERPL-CCNC: 131 U/L
ALT SERPL W/O P-5'-P-CCNC: 56 U/L
AMPHET+METHAMPHET UR QL: NEGATIVE
AMYLASE SERPL-CCNC: 36 U/L
ANION GAP SERPL CALC-SCNC: 10 MMOL/L
AORTIC VALVE REGURGITATION: NORMAL
AST SERPL-CCNC: 40 U/L
BACTERIA #/AREA URNS HPF: NORMAL /HPF
BARBITURATES UR QL SCN>200 NG/ML: NEGATIVE
BASOPHILS # BLD AUTO: 0 K/UL
BASOPHILS NFR BLD: 0 %
BENZODIAZ UR QL SCN>200 NG/ML: NEGATIVE
BILIRUB SERPL-MCNC: 0.6 MG/DL
BILIRUB UR QL STRIP: NEGATIVE
BLD GP AB SCN CELLS X3 SERPL QL: NORMAL
BUN SERPL-MCNC: 7 MG/DL
BZE UR QL SCN: NEGATIVE
CALCIUM SERPL-MCNC: 9.2 MG/DL
CANNABINOIDS UR QL SCN: NORMAL
CHLORIDE SERPL-SCNC: 107 MMOL/L
CLARITY UR: CLEAR
CO2 SERPL-SCNC: 21 MMOL/L
COLOR UR: YELLOW
CREAT SERPL-MCNC: 0.7 MG/DL
CREAT UR-MCNC: 66.5 MG/DL
DIASTOLIC DYSFUNCTION: NO
DIFFERENTIAL METHOD: ABNORMAL
EOSINOPHIL # BLD AUTO: 0 K/UL
EOSINOPHIL NFR BLD: 0.2 %
ERYTHROCYTE [DISTWIDTH] IN BLOOD BY AUTOMATED COUNT: 13.6 %
EST. GFR  (AFRICAN AMERICAN): >60 ML/MIN/1.73 M^2
EST. GFR  (NON AFRICAN AMERICAN): >60 ML/MIN/1.73 M^2
ESTIMATED PA SYSTOLIC PRESSURE: 13.1
GLUCOSE SERPL-MCNC: 78 MG/DL
GLUCOSE UR QL STRIP: NEGATIVE
HCT VFR BLD AUTO: 33.8 %
HGB BLD-MCNC: 11.6 G/DL
HGB UR QL STRIP: NEGATIVE
KETONES UR QL STRIP: ABNORMAL
LEUKOCYTE ESTERASE UR QL STRIP: ABNORMAL
LIPASE SERPL-CCNC: 11 U/L
LYMPHOCYTES # BLD AUTO: 1.2 K/UL
LYMPHOCYTES NFR BLD: 21.6 %
MCH RBC QN AUTO: 31.6 PG
MCHC RBC AUTO-ENTMCNC: 34.3 G/DL
MCV RBC AUTO: 92 FL
METHADONE UR QL SCN>300 NG/ML: NEGATIVE
MICROSCOPIC COMMENT: NORMAL
MITRAL VALVE REGURGITATION: NORMAL
MONOCYTES # BLD AUTO: 0.3 K/UL
MONOCYTES NFR BLD: 5.3 %
NEUTROPHILS # BLD AUTO: 4 K/UL
NEUTROPHILS NFR BLD: 72.7 %
NITRITE UR QL STRIP: NEGATIVE
OPIATES UR QL SCN: NEGATIVE
OTHER ELEMENTS URNS MICRO: NORMAL
PCP UR QL SCN>25 NG/ML: NEGATIVE
PH UR STRIP: 7 [PH] (ref 5–8)
PLATELET # BLD AUTO: 178 K/UL
PMV BLD AUTO: 11.9 FL
POTASSIUM SERPL-SCNC: 3.7 MMOL/L
PROT SERPL-MCNC: 6.6 G/DL
PROT UR QL STRIP: NEGATIVE
RBC # BLD AUTO: 3.67 M/UL
RETIRED EF AND QEF - SEE NOTES: 67 (ref 55–65)
RPR SER QL: NORMAL
SODIUM SERPL-SCNC: 138 MMOL/L
SP GR UR STRIP: 1.01 (ref 1–1.03)
TOXICOLOGY INFORMATION: NORMAL
TROPONIN I SERPL DL<=0.01 NG/ML-MCNC: 0.01 NG/ML
TROPONIN I SERPL DL<=0.01 NG/ML-MCNC: 0.02 NG/ML
TROPONIN I SERPL DL<=0.01 NG/ML-MCNC: 0.02 NG/ML
URN SPEC COLLECT METH UR: ABNORMAL
UROBILINOGEN UR STRIP-ACNC: 1 EU/DL
WBC # BLD AUTO: 5.5 K/UL
WBC #/AREA URNS HPF: 5 /HPF (ref 0–5)

## 2018-09-14 PROCEDURE — 36415 COLL VENOUS BLD VENIPUNCTURE: CPT

## 2018-09-14 PROCEDURE — 63600175 PHARM REV CODE 636 W HCPCS: Performed by: OBSTETRICS & GYNECOLOGY

## 2018-09-14 PROCEDURE — 81000 URINALYSIS NONAUTO W/SCOPE: CPT

## 2018-09-14 PROCEDURE — 93306 TTE W/DOPPLER COMPLETE: CPT

## 2018-09-14 PROCEDURE — 99284 EMERGENCY DEPT VISIT MOD MDM: CPT | Mod: 25,,, | Performed by: OBSTETRICS & GYNECOLOGY

## 2018-09-14 PROCEDURE — 84484 ASSAY OF TROPONIN QUANT: CPT | Mod: 91

## 2018-09-14 PROCEDURE — 63600175 PHARM REV CODE 636 W HCPCS: Performed by: STUDENT IN AN ORGANIZED HEALTH CARE EDUCATION/TRAINING PROGRAM

## 2018-09-14 PROCEDURE — 11000001 HC ACUTE MED/SURG PRIVATE ROOM

## 2018-09-14 PROCEDURE — 4A0HXCZ MEASUREMENT OF PRODUCTS OF CONCEPTION, CARDIAC RATE, EXTERNAL APPROACH: ICD-10-PCS | Performed by: OBSTETRICS & GYNECOLOGY

## 2018-09-14 PROCEDURE — 25000003 PHARM REV CODE 250: Performed by: STUDENT IN AN ORGANIZED HEALTH CARE EDUCATION/TRAINING PROGRAM

## 2018-09-14 PROCEDURE — 99900035 HC TECH TIME PER 15 MIN (STAT)

## 2018-09-14 PROCEDURE — 85025 COMPLETE CBC W/AUTO DIFF WBC: CPT

## 2018-09-14 PROCEDURE — 93005 ELECTROCARDIOGRAM TRACING: CPT

## 2018-09-14 PROCEDURE — 59025 FETAL NON-STRESS TEST: CPT | Mod: 26,,, | Performed by: OBSTETRICS & GYNECOLOGY

## 2018-09-14 PROCEDURE — 59025 FETAL NON-STRESS TEST: CPT | Mod: 26,77,, | Performed by: OBSTETRICS & GYNECOLOGY

## 2018-09-14 PROCEDURE — 80053 COMPREHEN METABOLIC PANEL: CPT

## 2018-09-14 PROCEDURE — 80307 DRUG TEST PRSMV CHEM ANLYZR: CPT

## 2018-09-14 PROCEDURE — 87086 URINE CULTURE/COLONY COUNT: CPT

## 2018-09-14 PROCEDURE — 99233 SBSQ HOSP IP/OBS HIGH 50: CPT | Mod: 25,,, | Performed by: OBSTETRICS & GYNECOLOGY

## 2018-09-14 PROCEDURE — 93010 ELECTROCARDIOGRAM REPORT: CPT | Mod: ,,, | Performed by: INTERNAL MEDICINE

## 2018-09-14 PROCEDURE — 84484 ASSAY OF TROPONIN QUANT: CPT

## 2018-09-14 RX ORDER — DEXTROSE, SODIUM CHLORIDE, SODIUM LACTATE, POTASSIUM CHLORIDE, AND CALCIUM CHLORIDE 5; .6; .31; .03; .02 G/100ML; G/100ML; G/100ML; G/100ML; G/100ML
INJECTION, SOLUTION INTRAVENOUS CONTINUOUS
Status: DISCONTINUED | OUTPATIENT
Start: 2018-09-14 | End: 2018-09-15

## 2018-09-14 RX ORDER — CALCIUM CARBONATE 200(500)MG
1000 TABLET,CHEWABLE ORAL ONCE
Status: COMPLETED | OUTPATIENT
Start: 2018-09-14 | End: 2018-09-14

## 2018-09-14 RX ORDER — AMOXICILLIN 250 MG
1 CAPSULE ORAL NIGHTLY PRN
Status: DISCONTINUED | OUTPATIENT
Start: 2018-09-14 | End: 2018-09-18 | Stop reason: HOSPADM

## 2018-09-14 RX ORDER — LANOLIN ALCOHOL/MO/W.PET/CERES
100 CREAM (GRAM) TOPICAL DAILY
Status: DISCONTINUED | OUTPATIENT
Start: 2018-09-14 | End: 2018-09-14

## 2018-09-14 RX ORDER — SIMETHICONE 80 MG
1 TABLET,CHEWABLE ORAL EVERY 6 HOURS PRN
Status: DISCONTINUED | OUTPATIENT
Start: 2018-09-14 | End: 2018-09-18 | Stop reason: HOSPADM

## 2018-09-14 RX ORDER — SODIUM CITRATE AND CITRIC ACID MONOHYDRATE 334; 500 MG/5ML; MG/5ML
30 SOLUTION ORAL ONCE
Status: COMPLETED | OUTPATIENT
Start: 2018-09-14 | End: 2018-09-14

## 2018-09-14 RX ORDER — PRENATAL WITH FERROUS FUM AND FOLIC ACID 3080; 920; 120; 400; 22; 1.84; 3; 20; 10; 1; 12; 200; 27; 25; 2 [IU]/1; [IU]/1; MG/1; [IU]/1; MG/1; MG/1; MG/1; MG/1; MG/1; MG/1; UG/1; MG/1; MG/1; MG/1; MG/1
1 TABLET ORAL DAILY
Status: DISCONTINUED | OUTPATIENT
Start: 2018-09-14 | End: 2018-09-18 | Stop reason: HOSPADM

## 2018-09-14 RX ADMIN — METHYLPREDNISOLONE SODIUM SUCCINATE 16 MG: 40 INJECTION, POWDER, FOR SOLUTION INTRAMUSCULAR; INTRAVENOUS at 02:09

## 2018-09-14 RX ADMIN — PRENATAL VIT W/ FE FUMARATE-FA TAB 27-0.8 MG 1 TABLET: 27-0.8 TAB at 10:09

## 2018-09-14 RX ADMIN — CALCIUM CARBONATE 1000 MG: 500 TABLET, CHEWABLE ORAL at 09:09

## 2018-09-14 RX ADMIN — DEXTROSE, SODIUM CHLORIDE, SODIUM LACTATE, POTASSIUM CHLORIDE, AND CALCIUM CHLORIDE: 5; .6; .31; .03; .02 INJECTION, SOLUTION INTRAVENOUS at 09:09

## 2018-09-14 RX ADMIN — METHYLPREDNISOLONE SODIUM SUCCINATE 16 MG: 40 INJECTION, POWDER, FOR SOLUTION INTRAMUSCULAR; INTRAVENOUS at 06:09

## 2018-09-14 RX ADMIN — SODIUM CITRATE AND CITRIC ACID MONOHYDRATE 30 ML: 500; 334 SOLUTION ORAL at 03:09

## 2018-09-14 RX ADMIN — METHYLPREDNISOLONE SODIUM SUCCINATE 16 MG: 40 INJECTION, POWDER, FOR SOLUTION INTRAMUSCULAR; INTRAVENOUS at 10:09

## 2018-09-14 RX ADMIN — SODIUM CHLORIDE 1000 ML: 0.9 INJECTION, SOLUTION INTRAVENOUS at 01:09

## 2018-09-14 RX ADMIN — DEXTROSE, SODIUM CHLORIDE, SODIUM LACTATE, POTASSIUM CHLORIDE, AND CALCIUM CHLORIDE: 5; .6; .31; .03; .02 INJECTION, SOLUTION INTRAVENOUS at 05:09

## 2018-09-14 RX ADMIN — SODIUM CITRATE AND CITRIC ACID MONOHYDRATE 30 ML: 500; 334 SOLUTION ORAL at 10:09

## 2018-09-14 RX ADMIN — FOLIC ACID: 5 INJECTION, SOLUTION INTRAMUSCULAR; INTRAVENOUS; SUBCUTANEOUS at 03:09

## 2018-09-14 NOTE — ED PROVIDER NOTES
"Encounter Date: 2018       History     Chief Complaint   Patient presents with    Abdominal Pain     Daija Purvis is a 28 y.o. F at 34w4d presents complaining of nausea and vomiting.  Patient reports nausea and vomiting this entire pregnancy. She has been admitted for hyperemesis twice earlier this pregnancy. She has been resistant to several antiemetics but was eventually controlled on zofran and thorazine. Patient reports her N/V returned 5 days ago, reporting inability to tolerate both food and liquids. She believes she has had a 15lb weight loss in the last 1.5 months. She states she has overall lost weight this pregnancy.  She denies any fever or chills, denies sick contacts, denies diarrhea, denies urinary complaints. She reports midline abdominal discomfort.   Patient denies CTXs, VB, LOF, reports good FM.     During examination, patient was noted to have irregular heartbeat. F/U EKG revealed  PVCs in a pattern of bigeminy.  Of note, there was concern for possible STEMI earlier in this pregnancy that was eventually ruled out.  Patient currently does report midline chest pain, is unsure if this is related to vomiting.  She states the chest pain started about 1 hour ago.  She states the pain does not radiate to her jaw, neck, arm, or back. She is not sweating. She states the pain feels like "somebody punched me.".             Review of patient's allergies indicates:   Allergen Reactions    Codeine      No past medical history on file.  No past surgical history on file.  Family History   Problem Relation Age of Onset    Colon cancer Neg Hx     Ovarian cancer Neg Hx      Social History     Tobacco Use    Smoking status: Former Smoker     Types: Cigarettes    Smokeless tobacco: Never Used   Substance Use Topics    Alcohol use: No    Drug use: No     Review of Systems   Constitutional: Positive for appetite change. Negative for chills, diaphoresis and fever.   Eyes: Negative for pain, redness " and visual disturbance.   Respiratory: Negative for cough, chest tightness, shortness of breath and wheezing.    Cardiovascular: Positive for chest pain. Negative for leg swelling.   Gastrointestinal: Positive for abdominal pain, nausea and vomiting. Negative for constipation.   Genitourinary: Negative for dysuria, pelvic pain, vaginal bleeding and vaginal discharge.   Musculoskeletal: Negative for back pain.   Neurological: Negative for seizures, speech difficulty, weakness and light-headedness.   Psychiatric/Behavioral: Negative for agitation, behavioral problems and suicidal ideas.       Physical Exam     Initial Vitals   BP Pulse Resp Temp SpO2   09/13/18 2242 09/13/18 2240 09/13/18 2242 09/13/18 2242 09/13/18 2240   113/83 79 18 96.7 °F (35.9 °C) 100 %      MAP       --                Physical Exam    Constitutional: She appears well-developed and well-nourished. She is not diaphoretic. No distress.   HENT:   Head: Normocephalic and atraumatic.   Right Ear: External ear normal.   Left Ear: External ear normal.   Eyes: Conjunctivae are normal. Right eye exhibits no discharge. Left eye exhibits no discharge.   Cardiovascular: Normal rate. Exam reveals no friction rub.    No murmur heard.  Irregularly Irregular heart beat   Pulmonary/Chest: Breath sounds normal. No respiratory distress. She has no wheezes. She has no rales.   Abdominal: Soft. There is tenderness (mild fundal tenderness). There is no rebound and no guarding.   Gravid   Musculoskeletal: She exhibits no edema or tenderness.   Neurological: She is alert and oriented to person, place, and time. She has normal strength.   Skin: Skin is warm and dry. No rash noted. No erythema.   Psychiatric: She has a normal mood and affect. Her behavior is normal. Thought content normal.     OB LABOR EXAM:   Pre-Term Labor: No.   Membranes ruptured: No.                         ED Course   Nonstress test  Date/Time: 9/14/2018 2:20 AM  Performed by: Jessica Herrera  MD  Authorized by: Jessica Herrera MD     Nonstress Test:     Variability:  6-25 BPM    Decelerations:  None    Accelerations:  15 bpm    Baseline:  135    Uterine Irritability: No      Contractions:  Regular    Contraction Frequency:  4-5 minutes, nonpainful  Biophysical Profile:     Nonstress Test Interpretation: reactive      Overall Impression:  Reassuring  Post-procedure:     Patient tolerance:  Patient tolerated the procedure well with no immediate complications      Labs Reviewed   URINALYSIS - Abnormal; Notable for the following components:       Result Value    Appearance, UA Hazy (*)     Protein, UA 2+ (*)     Ketones, UA Trace (*)     Bilirubin (UA) 1+ (*)     Occult Blood UA 2+ (*)     Leukocytes, UA 2+ (*)     All other components within normal limits   CBC W/ AUTO DIFFERENTIAL - Abnormal; Notable for the following components:    MCH 31.9 (*)     All other components within normal limits   COMPREHENSIVE METABOLIC PANEL - Abnormal; Notable for the following components:    Sodium 134 (*)     CO2 18 (*)     Glucose 65 (*)     Alkaline Phosphatase 148 (*)     AST 47 (*)     ALT 64 (*)     All other components within normal limits   URINALYSIS MICROSCOPIC - Abnormal; Notable for the following components:    RBC, UA 5 (*)     WBC, UA >100 (*)     Bacteria, UA Many (*)     Trichomonas, UA Moderate (*)     All other components within normal limits   AMYLASE   LIPASE   TROPONIN I   AMYLASE   LIPASE   TROPONIN I   HIV 1 / 2 ANTIBODY   POCT URINALYSIS, DIPSTICK OR TABLET REAGENT, AUTOMATED, WITH MICROSCOP   TYPE & SCREEN          Imaging Results    None          Medical Decision Making:   ED Management:  Discussed patient with Dr. Page. Patient has complex history with N/V requiring several antiemetics now with irregular heart beat, PVCx in pattern of bigeminy, likely due to antiemetics.     Plan for admission    N/V:  Will D/C antiemetics, given cardiac SEs  Trial steroids  Bolus 2L for hydration, Banana  Bag, then start maintenance fluids  NPO for now  Repeat AM CBC/CMP    Possible UTI  UA with 2+ leuks, 2+ blood, trace ketones, no nitrites  Also + for trichomonas  Previously treated for this earlier in pregnancy, patient unsure if partner has been treated  Will hold on flagyl given nausea SE  Repeat UA as cath specimen and f/u culture    PVC with Bigeminy:  Newly found on exam+ EKG today  Likely 2/2 adverse effect of antiemetics  D/C zofran and thorazine  Mild CP, unclear if related to vomiting  Troponin negative  Stable for admission to floor, will continue on telemetry  Cardiology consult    Plan discussed with Dr. Arndt and Dr. Page    Other:   I discussed test(s) with the performing physician.                   ED Course as of Sep 14 0603   Thu Sep 13, 2018   2329 I evaluated Ms. Purvis and discussed plan with Dr. Herrera.  Pt presents at 34w4d with persistent n/v of pregnancy.  She has been hospitalized in the ICU for severe dehydration during this pregnancy.  She states that she is losing weight and that for the last 5 days she has been unable to tolerate even liquids.  She has continued to take her thorazine and zofran without relief, and was vomiting up the medications.  She has also developed uterine fundus tenderness.  Here, she appears sick. Lips are dry, eyes are sunken.   VSS, heart rate in 's  Heart is irregularly irregular  CTAB, no CVA ttp  Mild point tenderness at fundus  Fetal status reactive and reassuring with some irregular contractions  U/a with many abnormalities, including trace ketones, 2+ leuks, >100 WBC, many bacteria and moderate trichomonas  Cbc normal, pending electrolytes.    Weight today is 151.6 lbs, down from 157 a month ago  Will get EKG for irregular heart beat.   Most likely will admit  []   Fri Sep 14, 2018   0026 EKG revealed tachy rate at 103, with frequent PVC's in pattern of bigeminy.  Reviewed with anesthesia, and given that patient had no complaints of  CP/SOB, he believes she is stable for tele on floor.   However, when I returned to discuss findings with Ms. Purvis, she states that although she has no SOB, she has noted CP, mid chest, as if she is being hit in the chest.   She states it does not radiate, move to her arm/shoulder/chin, nor is she diaphoretic.    Will call [facility] and get recommendations for admission  []   0101 Case d/w Dr. Katz.  Will admit to antepartum, will get troponin, amylase and lipase  []      ED Course User Index  [HU] Sowmya Arndt DO     Clinical Impression:   The primary encounter diagnosis was Hyperemesis gravidarum with dehydration. Diagnoses of Irregular heart beat, Nausea/vomiting in pregnancy, Pregnancy with 34 completed weeks gestation, and Trichomonal infection were also pertinent to this visit.      Disposition:   Disposition: Admitted  Condition: Patricia Herrera MD  Resident  09/14/18 0232       Sowmya Arndt DO  09/14/18 0604

## 2018-09-14 NOTE — H&P
"[facility]  Obstetrics  History & Physical    Patient Name: Daija Purvis  MRN: [id number]  Admission Date: 2018  Primary Care Provider: Lizet Abreu MD    Subjective:     Principal Problem:Hyperemesis gravidarum with dehydration    History of Present Illness:  Daija Purvis is a 28 y.o. F at 34w4d presents complaining of nausea and vomiting.  Patient reports nausea and vomiting this entire pregnancy. She has been admitted for hyperemesis twice earlier this pregnancy. She has been resistant to several antiemetics but was eventually controlled on zofran and thorazine. Patient reports her N/V returned 5 days ago, reporting inability to tolerate both food and liquids. She believes she has had a 15lb weight loss in the last 1.5 months. She states she has overall lost weight this pregnancy.  She denies any fever or chills, denies sick contacts, denies diarrhea, denies urinary complaints. She reports midline abdominal discomfort.   Patient denies CTXs, VB, LOF, reports good FM.      During examination, patient was noted to have irregular heartbeat. F/U EKG revealed  PVCs in a pattern of bigeminy.  Of note, there was concern for possible STEMI earlier in this pregnancy that was eventually ruled out.  Patient currently does report midline chest pain, is unsure if this is related to vomiting.  She states the chest pain started about 1 hour ago.  She states the pain does not radiate to her jaw, neck, arm, or back. She is not sweating. She states the pain feels like "somebody punched me.".           Obstetric History       T0      L0     SAB0   TAB0   Ectopic0   Multiple0   Live Births0       # Outcome Date GA Lbr Lan/2nd Weight Sex Delivery Anes PTL Lv   1 Current                 No past medical history on file.  No past surgical history on file.    PTA Medications   Medication Sig    calcium-vitamin D 600 mg(1,500mg) -400 unit Tab Take 2 tablets by mouth once daily.    " chlorproMAZINE (THORAZINE) 25 MG tablet Take 1 tablet (25 mg total) by mouth 3 (three) times daily. (Patient taking differently: Take 25 mg by mouth 3 (three) times daily. )    famotidine (PEPCID) 20 MG tablet Take 1 tablet (20 mg total) by mouth 2 (two) times daily.    ondansetron (ZOFRAN) 4 MG tablet Take 1 tablet (4 mg total) by mouth every 6 (six) hours as needed for Nausea.    prenatal vit,calc76/iron/folic (PNV 29-1 ORAL) Take 1 tablet by mouth once daily.       Review of patient's allergies indicates:   Allergen Reactions    Codeine         Family History     None        Tobacco Use    Smoking status: Former Smoker     Types: Cigarettes    Smokeless tobacco: Never Used   Substance and Sexual Activity    Alcohol use: No    Drug use: No    Sexual activity: Yes     Review of Systems   Constitutional: Positive for appetite change and unexpected weight change. Negative for chills and fever.   Respiratory: Negative for shortness of breath and wheezing.    Cardiovascular: Positive for chest pain.   Gastrointestinal: Positive for abdominal pain, nausea and vomiting. Negative for diarrhea.   Endocrine: Negative for diabetes, hyperthyroidism and hypothyroidism.   Genitourinary: Negative for dysuria, hematuria, pelvic pain, vaginal bleeding and vaginal pain.   Neurological: Negative for headaches.   Psychiatric/Behavioral: Negative for depression.      Objective:     Vital Signs (Most Recent):  Temp: 96.7 °F (35.9 °C) (09/13/18 2242)  Pulse: 76 (09/13/18 2330)  Resp: 18 (09/13/18 2242)  BP: 113/83 (09/13/18 2242)  SpO2: 98 % (09/13/18 2330) Vital Signs (24h Range):  Temp:  [96.7 °F (35.9 °C)] 96.7 °F (35.9 °C)  Pulse:  [] 76  Resp:  [18] 18  SpO2:  [96 %-100 %] 98 %  BP: (113)/(83) 113/83     Weight: 68.8 kg (151 lb 10.8 oz)  Body mass index is 28.66 kg/m².    FHT: Cat 1 (reassuring) 135 bpm, Mod BTBV, + Acc, - Dec, Reactive/Reassuring   TOCO:  Q 4-5 minutes, non painful, stopped after fluid bolus      Physical Exam:   Constitutional: She is oriented to person, place, and time. She appears well-developed.     Eyes: EOM are normal.    Neck: Normal range of motion.    Cardiovascular:   Tachycardic, irregular heart rate    Pulmonary/Chest: Effort normal and breath sounds normal. No respiratory distress. She has no wheezes.        Abdominal: Soft. She exhibits no distension. There is no tenderness. There is no rebound and no guarding.   Mild fundal tenderness             Musculoskeletal: Normal range of motion.       Neurological: She is alert and oriented to person, place, and time.    Skin: Skin is warm and dry.    Psychiatric: She has a normal mood and affect. Her behavior is normal. Judgment and thought content normal.       Cervix:  Deferred     Significant Labs:  Lab Results   Component Value Date    GROUPTRH A POS 2018    HEPBSAG Negative 2018       I have personallly reviewed all pertinent lab results from the last 24 hours.    Assessment/Plan:     28 y.o. female  at 34w5d for:    * Hyperemesis gravidarum with dehydration    - Continued N/V, resistant to multiple medications throughout pregnancy  - Patient has lost 6 pounds in last 1.5 months  - CBC: 7.22/12.9/37/183  - CMP: Na 134, K 3.9, Cr 0.7, AST/ALT 47/64  - Amylase/Lipase WNL  - S/P 1L bolus D5LR in [place]  - Will bolus additional 1L NS and administer banana bag, then start maintenance fluids D5LR at 125cc/hr  - Unable to use zofran/thorazine/phenergan/compazine due to cardiac side effects  - Will trial steroids, methylprednisolone 16mg q8 x3 days, if works, will need taper over two weeks to the lowest effective dose  - Repeat CBC/CMP in the AM  - UA with 2+ Leuk/2+Blood/Trace Ketones/2+Protein/-Nitrites,Mod Trich. Unclear if related to UTI or trichomonas infection. Will repeat UA/UCx as cath specimen.   - NPO until N/V better controlled           Ventricular bigeminy    - Irregular heartbeat noted on examination  - F/U EKG noted  frequent EKGs in pattern of bigeminy  - Conduction abnormality likely 2/2 prolonged use of antiemetics zofran and thorazine  - Patient reports some mild CP currently, unclear if this is related to frequent vomiting  - Troponin negative  - Will D/C zofran/thorazine, avoid medications with arrhythmia side effects  - Continuous telemetry  - Discussed with cardiology fellow on call, no acute intervention warranted at this time, likely 2/2 medication effect and pregnancy. Plan to repeat EKG/troponin in AM and consult cardiology in AM         Pregnancy, supervision, high-risk    - Continue PNV  - T3 labs ordered  - Consider GBS while inpatient  - NST BID  - Last growth 7/19 with  g 15% [place]  - Needs repeat growth U/S         Trichomoniasis    - Seen on UA  - Patient treated earlier in pregnancy, unsure if her partner has been treated  - Will hold on flagyl given nauseating SEs  - Consider treatment when N/V better controlled            Jessica Herrera MD  Obstetrics  [facility]

## 2018-09-14 NOTE — ANESTHESIA PREPROCEDURE EVALUATION
Daija Purvis is a 28 y.o. female F at 34w4d who presented to the OB ED with a chief complaint of nausea and vomiting. She has been admitted for hyperemesis twice earlier this pregnancy. During examination by ob staff the patient was noted to have irregular heartbeat. F/U EKG revealed  PVCs in a pattern of bigeminy. Patient also began endorsing midline chest pain. MFM on call was consulted patient was admitted to ante partum for further work up of her chest pain and to control her N/V.    OB History    Para Term  AB Living   1             SAB TAB Ectopic Multiple Live Births                  # Outcome Date GA Lbr Lan/2nd Weight Sex Delivery Anes PTL Lv   1 Current                   Wt Readings from Last 1 Encounters:   18 2333 68.8 kg (151 lb 10.8 oz)       BP Readings from Last 3 Encounters:   18 113/83   18 118/70   18 98/64       Patient Active Problem List   Diagnosis    Hyperemesis gravidarum with dehydration    Severe malnutrition    Pregnancy, supervision, high-risk    Irregular heart beat       No past surgical history on file.    Social History     Socioeconomic History    Marital status: Single     Spouse name: Not on file    Number of children: Not on file    Years of education: Not on file    Highest education level: Not on file   Social Needs    Financial resource strain: Not on file    Food insecurity - worry: Not on file    Food insecurity - inability: Not on file    Transportation needs - medical: Not on file    Transportation needs - non-medical: Not on file   Occupational History    Not on file   Tobacco Use    Smoking status: Former Smoker     Types: Cigarettes    Smokeless tobacco: Never Used   Substance and Sexual Activity    Alcohol use: No    Drug use: No    Sexual activity: Yes   Other Topics Concern    Not on file   Social History Narrative    Not on file         Chemistry        Component Value Date/Time     (L)  09/13/2018 2310    K 3.9 09/13/2018 2310     09/13/2018 2310    CO2 18 (L) 09/13/2018 2310    BUN 13 09/13/2018 2310    CREATININE 0.7 09/13/2018 2310    GLU 65 (L) 09/13/2018 2310        Component Value Date/Time    CALCIUM 9.9 09/13/2018 2310    ALKPHOS 148 (H) 09/13/2018 2310    AST 47 (H) 09/13/2018 2310    ALT 64 (H) 09/13/2018 2310    BILITOT 0.6 09/13/2018 2310    ESTGFRAFRICA >60 09/13/2018 2310    EGFRNONAA >60 09/13/2018 2310            Lab Results   Component Value Date    WBC 7.22 09/13/2018    HGB 12.9 09/13/2018    HCT 37.0 09/13/2018    MCV 91 09/13/2018     09/13/2018       No results for input(s): PT, INR, PROTIME, APTT in the last 72 hours.                  Anesthesia Evaluation    I have reviewed the Patient Summary Reports.    I have reviewed the Nursing Notes.      Review of Systems  Anesthesia Hx:  History of prior surgery of interest to airway management or planning: Denies Family Hx of Anesthesia complications.   Denies Personal Hx of Anesthesia complications.   Hematology/Oncology:  Hematology Normal   Oncology Normal     EENT/Dental:EENT/Dental Normal   Cardiovascular:   Dysrhythmias    Pulmonary:  Pulmonary Normal    Hepatic/GI:   n/v   Musculoskeletal:  Musculoskeletal Normal    Neurological:  Neurology Normal    Endocrine:  Endocrine Normal    Dermatological:  Skin Normal    Psych:  Psychiatric Normal           Physical Exam  General:  Well nourished    Airway/Jaw/Neck:  Airway Findings: Mouth Opening: Normal Tongue: Normal  General Airway Assessment: Adult  Mallampati: II  TM Distance: Normal, at least 6 cm  Jaw/Neck Findings:  Neck ROM: Normal ROM  Neck Findings: Normal    Eyes/Ears/Nose:  EYES/EARS/NOSE FINDINGS: Normal   Dental:  Dental Findings: In tact, Periodontal disease, Mild   Chest/Lungs:  Chest/Lungs Findings: Clear to auscultation, Normal Respiratory Rate     Heart/Vascular:  Heart Findings: Rate: Normal  Rhythm: Regular Rhythm  Sounds: Normal  Heart murmur:  negative Vascular Findings: Normal    Abdomen:  Abdomen Findings: Normal    Musculoskeletal:  Musculoskeletal Findings: Normal   Skin:  Skin Findings: Normal    Mental Status:  Mental Status Findings:  Cooperative, Alert and Oriented         Anesthesia Plan  Type of Anesthesia, risks & benefits discussed:  Anesthesia Type:  CSE, epidural, general, spinal  Patient's Preference:   Intra-op Monitoring Plan: standard ASA monitors  Intra-op Monitoring Plan Comments:   Post Op Pain Control Plan: multimodal analgesia and per primary service following discharge from PACU  Post Op Pain Control Plan Comments:   Induction:   IV  Beta Blocker:  Patient is not currently on a Beta-Blocker (No further documentation required).       Informed Consent: Patient understands risks and agrees with Anesthesia plan.  Questions answered. Anesthesia consent signed with patient.  ASA Score: 2     Day of Surgery Review of History & Physical:  There are no significant changes.          Ready For Surgery From Anesthesia Perspective.

## 2018-09-14 NOTE — HPI
"Daija Purvis is a 28 y.o. F at 34w4d presents complaining of nausea and vomiting.  Patient reports nausea and vomiting this entire pregnancy. She has been admitted for hyperemesis twice earlier this pregnancy. She has been resistant to several antiemetics but was eventually controlled on zofran and thorazine. Patient reports her N/V returned 5 days ago, reporting inability to tolerate both food and liquids. She believes she has had a 15lb weight loss in the last 1.5 months. She states she has overall lost weight this pregnancy.  She denies any fever or chills, denies sick contacts, denies diarrhea, denies urinary complaints. She reports midline abdominal discomfort.   Patient denies CTXs, VB, LOF, reports good FM.      During examination, patient was noted to have irregular heartbeat. F/U EKG revealed  PVCs in a pattern of bigeminy.  Of note, there was concern for possible STEMI earlier in this pregnancy that was eventually ruled out.  Patient currently does report midline chest pain, is unsure if this is related to vomiting.  She states the chest pain started about 1 hour ago.  She states the pain does not radiate to her jaw, neck, arm, or back. She is not sweating. She states the pain feels like "somebody punched me.".       "

## 2018-09-14 NOTE — ASSESSMENT & PLAN NOTE
- Irregular heartbeat noted on examination  - F/U EKG noted frequent EKGs in pattern of bigeminy  - Conduction abnormality likely 2/2 prolonged use of antiemetics zofran and thorazine  - Patient reports some mild CP currently, unclear if this is related to frequent vomiting  - Troponin negative  - Will D/C zofran/thorazine, avoid medications with arrhythmia side effects  - Continuous telemetry  - Discussed with cardiology fellow on call, no acute intervention warranted at this time, likely 2/2 medication effect and pregnancy. Plan to repeat EKG/troponin in AM and consult cardiology in AM

## 2018-09-14 NOTE — ASSESSMENT & PLAN NOTE
- Continued N/V, resistant to multiple medications throughout pregnancy  - Patient has lost 6 pounds in last 1.5 months  - CBC: 7.22/12.9/37/183  - CMP: Na 134, K 3.9, Cr 0.7, AST/ALT 47/64  - Amylase/Lipase WNL  - S/P 1L bolus D5LR in [place]  - Will bolus additional 1L NS and administer banana bag, then start maintenance fluids D5LR at 125cc/hr  - Unable to use zofran/thorazine/phenergan/compazine due to cardiac side effects  - Will trial steroids, methylprednisolone 16mg q8 x3 days, if works, will need taper over two weeks to the lowest effective dose  - Repeat CBC/CMP in the AM  - UA with 2+ Leuk/2+Blood/Trace Ketones/2+Protein/-Nitrites,Mod Trich. Unclear if related to UTI or trichomonas infection. Will repeat UA/UCx as cath specimen.   - NPO until N/V better controlled

## 2018-09-14 NOTE — CONSULTS
"HISTORY OF PRESENT ILLNESS:  Daija Purvis is a 28-year-old lady that is 34   weeks pregnant, was admitted to the hospital with complaints of nausea,   vomiting, and heartburn.  She says that she has had heartburn throughout her   entire pregnancy, she was getting better and then the day prior to this   admission, she says the heart rhythm returned.  She says that when she takes a   Pepcid the heart gets better.  She had an electrocardiogram performed during   this hospitalization, it revealed ventricular ectopic beats, and I was consulted   for evaluation.  Note that she was admitted here in May 2018, three months ago   for chest discomfort, which also was felt to have been atypical chest pains and   not on a cardiac basis.  She has no past history of hypertension, diabetes, or   cigarette smoking.    PHYSICAL EXAMINATION:  GENERAL:  Reveals an alert, pleasant lady, in no apparent acute distress.  VITAL SIGNS:  Blood pressure of 130/68, pulse of 70 beats per minute.  HEENT:  The sclerae is nonicteric.  The conjunctivae pink.  The ENT exam is   unremarkable.  NECK:  Supple.  There is no jugular venous distention.  The carotid upstroke is   brisk.  There is no carotid bruit.  CHEST:  Clear.  HEART:  Size is grossly normal.  S1 and S2 are normal.  There is no audible   murmur or gallop.  ABDOMEN:  Soft, nontender.  EXTREMITIES:  There is no pedal edema.  NEUROLOGIC:  Grossly, the neurological exam is intact.    The troponin levels x3 are all within normal limits.  The electrocardiogram   shows isolated ventricular ectopic beats, and is otherwise entirely within   normal limits.  Followup electrocardiogram is also within normal limits.    IMPRESSION ON ADMISSION:    1.  Clearly, the pains do not have a cardiac basis.  2.  The symptoms of heartburn, possibly related to reflux disease.  3.  Hyperemesis    RECOMMENDATIONS:  Reassurance.  Possibly reinstate Pepcid which she reports "the   only thing that helps."  " Thank you for the opportunity of seeing Mrs. Purvis   in consultation.      ALVIN/GASTON  dd: 09/14/2018 12:59:39 (CDT)  td: 09/15/2018 01:13:53 (CDT)  Doc ID   #[id number]  Job ID #[id number]    CC:

## 2018-09-14 NOTE — HOSPITAL COURSE
09/14/2018 Patient seen in [facility] for N/V. Workup notable for irregular heart beat, found to have PVCs in pattern of bigeminy, likely 2/2 prolonged use of zofran and thorazine.  Patient admitted for continued evaluation and management. Plan to stop all antiemetics, trial methylprednisilone. Continuous telemetry, troponin negative. Consult cardiology in the AM.   09/15/2018 Cardiology consulted, troponoins negative x 3, and repeat EKG with isolated vetricular beats but otherwise normal, maternal echocardiogram, EF 65-75%. Per cardiology recs, resume Pepcid as symptoms likely due to refulx.  Fetal NST reactive and reassuring.  Patient with continued N/V, on methylprednisolone.   09/16/2018 Patient doing well this AM. Tolerating regular diet without n/v. Steroids discontinued, will continue Pepcid as this relieves her symptoms. Patient complaining of contractions today, visible q4-5 minutes on tocometry. No change on SVE.   09/17/2018 Patient continues to complain of n/v. No further chest pain with pepcid. Will discuss anti-emetic medication use and safety with cardiology. Will obtain f/u of fetal growth today. D/w cardiology, pt ok to take Zofran/Thorazine.  09/18/2018 Patient reports tolerating broth last night. Thorazine has been helping somewhat. She had nausea with apple sauce last night. Has been eating minimally. Also reports a pain/pressure in her lower abdomen that started last night. Not relieved by urinating; she denies dysuria. 1cm dilated on SVE, same as previously. Discussed that she should try high calorie drinks if she is able to tolerate fluids better than solids by mouth. Discussed that due to IUGR otherwise uncomplicated, patient will need delivery at 37 weeks. Discussed that she will need prenatal testing twice weekly and weekly dopplers and BPP. Discharged with anti-emetics and pepcid. Patient to follow up in 1 week in clinic.

## 2018-09-14 NOTE — ASSESSMENT & PLAN NOTE
- Continue PNV  - T3 labs ordered  - Consider GBS while inpatient  - NST BID  - Last growth 7/19 with  g 15% [place]  - Needs repeat growth U/S

## 2018-09-14 NOTE — SUBJECTIVE & OBJECTIVE
Obstetric History       T0      L0     SAB0   TAB0   Ectopic0   Multiple0   Live Births0       # Outcome Date GA Lbr Lan/2nd Weight Sex Delivery Anes PTL Lv   1 Current                 No past medical history on file.  No past surgical history on file.    PTA Medications   Medication Sig    calcium-vitamin D 600 mg(1,500mg) -400 unit Tab Take 2 tablets by mouth once daily.    chlorproMAZINE (THORAZINE) 25 MG tablet Take 1 tablet (25 mg total) by mouth 3 (three) times daily. (Patient taking differently: Take 25 mg by mouth 3 (three) times daily. )    famotidine (PEPCID) 20 MG tablet Take 1 tablet (20 mg total) by mouth 2 (two) times daily.    ondansetron (ZOFRAN) 4 MG tablet Take 1 tablet (4 mg total) by mouth every 6 (six) hours as needed for Nausea.    prenatal vit,calc76/iron/folic (PNV 29-1 ORAL) Take 1 tablet by mouth once daily.       Review of patient's allergies indicates:   Allergen Reactions    Codeine         Family History     None        Tobacco Use    Smoking status: Former Smoker     Types: Cigarettes    Smokeless tobacco: Never Used   Substance and Sexual Activity    Alcohol use: No    Drug use: No    Sexual activity: Yes     Review of Systems   Constitutional: Positive for appetite change and unexpected weight change. Negative for chills and fever.   Respiratory: Negative for shortness of breath and wheezing.    Cardiovascular: Positive for chest pain.   Gastrointestinal: Positive for abdominal pain, nausea and vomiting. Negative for diarrhea.   Endocrine: Negative for diabetes, hyperthyroidism and hypothyroidism.   Genitourinary: Negative for dysuria, hematuria, pelvic pain, vaginal bleeding and vaginal pain.   Neurological: Negative for headaches.   Psychiatric/Behavioral: Negative for depression.      Objective:     Vital Signs (Most Recent):  Temp: 96.7 °F (35.9 °C) (18)  Pulse: 76 (18 2330)  Resp: 18 (18)  BP: 113/83 (18)  SpO2:  98 % (09/13/18 2330) Vital Signs (24h Range):  Temp:  [96.7 °F (35.9 °C)] 96.7 °F (35.9 °C)  Pulse:  [] 76  Resp:  [18] 18  SpO2:  [96 %-100 %] 98 %  BP: (113)/(83) 113/83     Weight: 68.8 kg (151 lb 10.8 oz)  Body mass index is 28.66 kg/m².    FHT: Cat 1 (reassuring) 135 bpm, Mod BTBV, + Acc, - Dec, Reactive/Reassuring   TOCO:  Q 4-5 minutes, non painful, stopped after fluid bolus     Physical Exam:   Constitutional: She is oriented to person, place, and time. She appears well-developed.     Eyes: EOM are normal.    Neck: Normal range of motion.    Cardiovascular:   Tachycardic, irregular heart rate    Pulmonary/Chest: Effort normal and breath sounds normal. No respiratory distress. She has no wheezes.        Abdominal: Soft. She exhibits no distension. There is no tenderness. There is no rebound and no guarding.   Mild fundal tenderness             Musculoskeletal: Normal range of motion.       Neurological: She is alert and oriented to person, place, and time.    Skin: Skin is warm and dry.    Psychiatric: She has a normal mood and affect. Her behavior is normal. Judgment and thought content normal.       Cervix:  Deferred     Significant Labs:  Lab Results   Component Value Date    GROUPTRH A POS 09/13/2018    HEPBSAG Negative 04/06/2018       I have personallly reviewed all pertinent lab results from the last 24 hours.

## 2018-09-14 NOTE — ASSESSMENT & PLAN NOTE
- Seen on UA  - Patient treated earlier in pregnancy, unsure if her partner has been treated  - Will hold on flagyl given nauseating SEs  - Consider treatment when N/V better controlled

## 2018-09-15 LAB — BACTERIA UR CULT: NO GROWTH

## 2018-09-15 PROCEDURE — 59025 FETAL NON-STRESS TEST: CPT | Mod: 26,,, | Performed by: OBSTETRICS & GYNECOLOGY

## 2018-09-15 PROCEDURE — 25000003 PHARM REV CODE 250: Performed by: STUDENT IN AN ORGANIZED HEALTH CARE EDUCATION/TRAINING PROGRAM

## 2018-09-15 PROCEDURE — 99232 SBSQ HOSP IP/OBS MODERATE 35: CPT | Mod: 25,,, | Performed by: OBSTETRICS & GYNECOLOGY

## 2018-09-15 PROCEDURE — 11000001 HC ACUTE MED/SURG PRIVATE ROOM

## 2018-09-15 PROCEDURE — 25000003 PHARM REV CODE 250: Performed by: OBSTETRICS & GYNECOLOGY

## 2018-09-15 PROCEDURE — 63600175 PHARM REV CODE 636 W HCPCS: Performed by: STUDENT IN AN ORGANIZED HEALTH CARE EDUCATION/TRAINING PROGRAM

## 2018-09-15 RX ORDER — FAMOTIDINE 20 MG/1
20 TABLET, FILM COATED ORAL 2 TIMES DAILY
Status: DISCONTINUED | OUTPATIENT
Start: 2018-09-15 | End: 2018-09-18 | Stop reason: HOSPADM

## 2018-09-15 RX ORDER — FAMOTIDINE 20 MG/1
20 TABLET, FILM COATED ORAL 2 TIMES DAILY
Status: DISCONTINUED | OUTPATIENT
Start: 2018-09-15 | End: 2018-09-15

## 2018-09-15 RX ADMIN — METHYLPREDNISOLONE SODIUM SUCCINATE 16 MG: 40 INJECTION, POWDER, FOR SOLUTION INTRAMUSCULAR; INTRAVENOUS at 10:09

## 2018-09-15 RX ADMIN — METHYLPREDNISOLONE SODIUM SUCCINATE 16 MG: 40 INJECTION, POWDER, FOR SOLUTION INTRAMUSCULAR; INTRAVENOUS at 06:09

## 2018-09-15 RX ADMIN — FAMOTIDINE 20 MG: 20 TABLET ORAL at 08:09

## 2018-09-15 RX ADMIN — METHYLPREDNISOLONE SODIUM SUCCINATE 16 MG: 40 INJECTION, POWDER, FOR SOLUTION INTRAMUSCULAR; INTRAVENOUS at 02:09

## 2018-09-15 RX ADMIN — DEXTROSE, SODIUM CHLORIDE, SODIUM LACTATE, POTASSIUM CHLORIDE, AND CALCIUM CHLORIDE: 5; .6; .31; .03; .02 INJECTION, SOLUTION INTRAVENOUS at 02:09

## 2018-09-15 RX ADMIN — FAMOTIDINE 20 MG: 20 TABLET ORAL at 11:09

## 2018-09-15 RX ADMIN — PRENATAL VIT W/ FE FUMARATE-FA TAB 27-0.8 MG 1 TABLET: 27-0.8 TAB at 08:09

## 2018-09-15 NOTE — ASSESSMENT & PLAN NOTE
- Irregular heartbeat noted on examination  - Conduction abnormality likely 2/2 prolonged use of antiemetics zofran and thorazine  - Troponin negative x 3  - EKG with isolated ventricular ectopic beats, and is otherwise within normal limits.  Followup electrocardiogram is also within normal limits.  - Will D/C zofran/thorazine, avoid medications with arrhythmia side effects  - Continuous telemetry  - Per cardiology recs, may reinstate pepcid as symptoms of chest discomfort likely secondary to reflux

## 2018-09-15 NOTE — PROGRESS NOTES
"[facility]  Obstetrics  Antepartum Progress Note    Patient Name: Daija Purvis  MRN: [id number]  Admission Date: 2018  Hospital Length of Stay: 1 days  Attending Physician: Adeline Boothe MD  Primary Care Provider: Lizet Abreu MD    Subjective:     Principal Problem:Hyperemesis gravidarum with dehydration    HPI:  Daija Purvis is a 28 y.o. F at 34w4d presents complaining of nausea and vomiting.  Patient reports nausea and vomiting this entire pregnancy. She has been admitted for hyperemesis twice earlier this pregnancy. She has been resistant to several antiemetics but was eventually controlled on zofran and thorazine. Patient reports her N/V returned 5 days ago, reporting inability to tolerate both food and liquids. She believes she has had a 15lb weight loss in the last 1.5 months. She states she has overall lost weight this pregnancy.  She denies any fever or chills, denies sick contacts, denies diarrhea, denies urinary complaints. She reports midline abdominal discomfort.   Patient denies CTXs, VB, LOF, reports good FM.      During examination, patient was noted to have irregular heartbeat. F/U EKG revealed  PVCs in a pattern of bigeminy.  Of note, there was concern for possible STEMI earlier in this pregnancy that was eventually ruled out.  Patient currently does report midline chest pain, is unsure if this is related to vomiting.  She states the chest pain started about 1 hour ago.  She states the pain does not radiate to her jaw, neck, arm, or back. She is not sweating. She states the pain feels like "somebody punched me.".         Hospital Course:  2018 Patient seen in [facility] for N/V. Workup notable for irregular heart beat, found to have PVCs in pattern of bigeminy, likely 2/2 prolonged use of zofran and thorazine.  Patient admitted for continued evaluation and management. Plan to stop all antiemetics, trial methylprednisilone. Continuous telemetry, troponin " negative. Consult cardiology in the AM.   09/15/2018 Cardiology consulted, troponoins negative x 3, and repeat EKG with isolated vetricular beats but otherwise normal, maternal echocardiogram, EF 65-75%. Per cardiology recs, resume Pepcid as symptoms likely due to refulx.  Fetal NST reactive and reassuring.  Patient with continued N/V, on methylprednisolone.       Interval Hx:  Patient with continued N/V.  Reports emesis 2 hours ago of water.  Currently on a diet of clear. Denies CP, SOB, heartburn, or dizziness at this time.  Denies contractions, LOF, bleeding, reports normal fetal movement.       Obstetric History       T0      L0     SAB0   TAB0   Ectopic0   Multiple0   Live Births0       # Outcome Date GA Lbr Lan/2nd Weight Sex Delivery Anes PTL Lv   1 Current                 History reviewed. No pertinent past medical history.  History reviewed. No pertinent surgical history.    PTA Medications   Medication Sig    calcium-vitamin D 600 mg(1,500mg) -400 unit Tab Take 2 tablets by mouth once daily.    chlorproMAZINE (THORAZINE) 25 MG tablet Take 1 tablet (25 mg total) by mouth 3 (three) times daily. (Patient taking differently: Take 25 mg by mouth 3 (three) times daily. )    famotidine (PEPCID) 20 MG tablet Take 1 tablet (20 mg total) by mouth 2 (two) times daily.    ondansetron (ZOFRAN) 4 MG tablet Take 1 tablet (4 mg total) by mouth every 6 (six) hours as needed for Nausea.    prenatal vit,calc76/iron/folic (PNV 29-1 ORAL) Take 1 tablet by mouth once daily.       Review of patient's allergies indicates:   Allergen Reactions    Codeine         Family History     None        Tobacco Use    Smoking status: Former Smoker     Types: Cigarettes    Smokeless tobacco: Never Used   Substance and Sexual Activity    Alcohol use: No    Drug use: No    Sexual activity: Yes     Review of Systems   Constitutional: Positive for unexpected weight change. Negative for appetite change, chills and fever.    Respiratory: Negative for shortness of breath and wheezing.    Cardiovascular: Negative for chest pain.   Gastrointestinal: Positive for nausea and vomiting. Negative for abdominal pain and diarrhea.   Endocrine: Negative for diabetes, hyperthyroidism and hypothyroidism.   Genitourinary: Negative for dysuria, hematuria, pelvic pain, vaginal bleeding and vaginal pain.   Neurological: Negative for headaches.   Psychiatric/Behavioral: Negative for depression.      Objective:     Vital Signs (Most Recent):  Temp: 98.3 °F (36.8 °C) (09/15/18 0406)  Pulse: 96 (09/15/18 0705)  Resp: 16 (09/15/18 0406)  BP: 104/66 (09/15/18 0406)  SpO2: (!) 92 % (09/15/18 0705) Vital Signs (24h Range):  Temp:  [96.6 °F (35.9 °C)-98.3 °F (36.8 °C)] 98.3 °F (36.8 °C)  Pulse:  [] 96  Resp:  [16-18] 16  SpO2:  [92 %-100 %] 92 %  BP: (101-114)/(54-79) 104/66     Weight: 71 kg (156 lb 8 oz)  Body mass index is 29.57 kg/m².    FHT: Cat 1 (reassuring) 135 bpm, Mod BTBV, + Acc, - Dec, Reactive/Reassuring   TOCO: No ctx  Physical Exam:   Constitutional: She is oriented to person, place, and time. She appears well-developed.     Eyes: EOM are normal.    Neck: Normal range of motion.    Cardiovascular: Normal rate.     Pulmonary/Chest: Effort normal and breath sounds normal. No respiratory distress. She has no wheezes.        Abdominal: Soft. She exhibits no distension. There is no tenderness. There is no rebound and no guarding.             Musculoskeletal: Normal range of motion.       Neurological: She is alert and oriented to person, place, and time.    Skin: Skin is warm and dry.    Psychiatric: She has a normal mood and affect. Her behavior is normal. Judgment and thought content normal.       Cervix:  Deferred     Significant Labs:  Lab Results   Component Value Date    GROUPTRH A POS 09/13/2018    HEPBSAG Negative 04/06/2018       I have personallly reviewed all pertinent lab results from the last 24 hours.    Assessment/Plan:     28  y.o. female  at 34w6d for:    * Hyperemesis gravidarum with dehydration    - Continued N/V, resistant to multiple medications throughout pregnancy  - Patient has lost 6 pounds in last 1.5 months  - CBC: 7.22/12.9/37/183  - CMP: Na 134, K 3.9, Cr 0.7, AST/ALT 47/64  - Amylase/Lipase WNL  - S/P 1L bolus D5LR in [facility]  - Will bolus additional 1L NS and administer banana bag, then start maintenance fluids D5LR at 125cc/hr  - Unable to use zofran/thorazine/phenergan/compazine due to cardiac side effects  - Currently on methylprednisolone 16mg q8 x3 days, if works, will need taper over two weeks to the lowest effective dose  - UA with 2+ Leuk/2+Blood/Trace Ketones/2+Protein/-Nitrites,Mod Trich. Unclear if related to UTI or trichomonas infection. Will repeat UA/UCx as cath specimen.   - currently on clear liquid diet          Ventricular bigeminy    - Irregular heartbeat noted on examination  - Conduction abnormality likely 2/2 prolonged use of antiemetics zofran and thorazine  - Troponin negative x 3  - EKG with isolated ventricular ectopic beats, and is otherwise within normal limits.  Followup electrocardiogram is also within normal limits.  - Will D/C zofran/thorazine, avoid medications with arrhythmia side effects  - Continuous telemetry  - Per cardiology recs, may reinstate pepcid as symptoms of chest discomfort likely secondary to reflux        Pregnancy, supervision, high-risk    - Continue PNV  - T3 labs ordered  - Consider GBS while inpatient  - NST BID  - Last growth  with  g 15% [place]  - Needs repeat growth U/S         Trichomoniasis    - Seen on UA  - Patient treated earlier in pregnancy, unsure if her partner has been treated  - Will hold on flagyl given nauseating SEs  - Consider treatment when N/V better controlled              Mohini York MD  Obstetrics  [facility]

## 2018-09-15 NOTE — ASSESSMENT & PLAN NOTE
- Continued N/V, resistant to multiple medications throughout pregnancy  - Patient has lost 6 pounds in last 1.5 months  - CBC: 7.22/12.9/37/183  - CMP: Na 134, K 3.9, Cr 0.7, AST/ALT 47/64  - Amylase/Lipase WNL  - S/P 1L bolus D5LR in [facility]  - Will bolus additional 1L NS and administer banana bag, then start maintenance fluids D5LR at 125cc/hr  - Unable to use zofran/thorazine/phenergan/compazine due to cardiac side effects  - Currently on methylprednisolone 16mg q8 x3 days, if works, will need taper over two weeks to the lowest effective dose  - UA with 2+ Leuk/2+Blood/Trace Ketones/2+Protein/-Nitrites,Mod Trich. Unclear if related to UTI or trichomonas infection. Will repeat UA/UCx as cath specimen.   - currently on clear liquid diet

## 2018-09-15 NOTE — PLAN OF CARE
Problem: Patient Care Overview  Goal: Plan of Care Review  Outcome: Ongoing (interventions implemented as appropriate)  Plan of care reviewed with pt. All questions answered. Made available throughout the shift to answer any additional questions. Pt rested comfortably overnight. Intermittant chest pain relieved with tums. No chest pain since. Pt remains on tele with bigeminy and pvcs. Also remains on CEFM. Pt with some nausea overnight but no vomitting. Able to keep saltine crackers down. Plan to continue with monitoring for patient. Will udpate POC as needed.

## 2018-09-15 NOTE — PROGRESS NOTES
"Feels well. Tolerating lunch. "On Pepcid, I have no heartburn"    Vitals:    09/15/18 0755 09/15/18 0824 09/15/18 1000 09/15/18 1201   BP: 117/86   109/81   BP Location:       Patient Position:       Pulse: (!) 42 95 94 89   Resp: 18   18   Temp: 98 °F (36.7 °C)   98.1 °F (36.7 °C)   TempSrc:       SpO2:       Weight:       Height:         Chest clear  Cor: no murmur or gallop.    EKG no ischemic changes.    Will sign off.  "

## 2018-09-15 NOTE — SUBJECTIVE & OBJECTIVE
Interval Hx:  Patient with continued N/V.  Reports emesis 2 hours ago of water.  Currently on a diet of clear. Denies CP, SOB, heartburn, or dizziness at this time.  Denies contractions, LOF, bleeding, reports normal fetal movement.       Obstetric History       T0      L0     SAB0   TAB0   Ectopic0   Multiple0   Live Births0       # Outcome Date GA Lbr Lan/2nd Weight Sex Delivery Anes PTL Lv   1 Current                 History reviewed. No pertinent past medical history.  History reviewed. No pertinent surgical history.    PTA Medications   Medication Sig    calcium-vitamin D 600 mg(1,500mg) -400 unit Tab Take 2 tablets by mouth once daily.    chlorproMAZINE (THORAZINE) 25 MG tablet Take 1 tablet (25 mg total) by mouth 3 (three) times daily. (Patient taking differently: Take 25 mg by mouth 3 (three) times daily. )    famotidine (PEPCID) 20 MG tablet Take 1 tablet (20 mg total) by mouth 2 (two) times daily.    ondansetron (ZOFRAN) 4 MG tablet Take 1 tablet (4 mg total) by mouth every 6 (six) hours as needed for Nausea.    prenatal vit,calc76/iron/folic (PNV 29-1 ORAL) Take 1 tablet by mouth once daily.       Review of patient's allergies indicates:   Allergen Reactions    Codeine         Family History     None        Tobacco Use    Smoking status: Former Smoker     Types: Cigarettes    Smokeless tobacco: Never Used   Substance and Sexual Activity    Alcohol use: No    Drug use: No    Sexual activity: Yes     Review of Systems   Constitutional: Positive for unexpected weight change. Negative for appetite change, chills and fever.   Respiratory: Negative for shortness of breath and wheezing.    Cardiovascular: Negative for chest pain.   Gastrointestinal: Positive for nausea and vomiting. Negative for abdominal pain and diarrhea.   Endocrine: Negative for diabetes, hyperthyroidism and hypothyroidism.   Genitourinary: Negative for dysuria, hematuria, pelvic pain, vaginal bleeding and vaginal  pain.   Neurological: Negative for headaches.   Psychiatric/Behavioral: Negative for depression.      Objective:     Vital Signs (Most Recent):  Temp: 98.3 °F (36.8 °C) (09/15/18 0406)  Pulse: 96 (09/15/18 0705)  Resp: 16 (09/15/18 0406)  BP: 104/66 (09/15/18 0406)  SpO2: (!) 92 % (09/15/18 0705) Vital Signs (24h Range):  Temp:  [96.6 °F (35.9 °C)-98.3 °F (36.8 °C)] 98.3 °F (36.8 °C)  Pulse:  [] 96  Resp:  [16-18] 16  SpO2:  [92 %-100 %] 92 %  BP: (101-114)/(54-79) 104/66     Weight: 71 kg (156 lb 8 oz)  Body mass index is 29.57 kg/m².    FHT: Cat 1 (reassuring) 135 bpm, Mod BTBV, + Acc, - Dec, Reactive/Reassuring   TOCO: No ctx  Physical Exam:   Constitutional: She is oriented to person, place, and time. She appears well-developed.     Eyes: EOM are normal.    Neck: Normal range of motion.    Cardiovascular: Normal rate.     Pulmonary/Chest: Effort normal and breath sounds normal. No respiratory distress. She has no wheezes.        Abdominal: Soft. She exhibits no distension. There is no tenderness. There is no rebound and no guarding.             Musculoskeletal: Normal range of motion.       Neurological: She is alert and oriented to person, place, and time.    Skin: Skin is warm and dry.    Psychiatric: She has a normal mood and affect. Her behavior is normal. Judgment and thought content normal.       Cervix:  Deferred     Significant Labs:  Lab Results   Component Value Date    GROUPTRH A POS 09/13/2018    HEPBSAG Negative 04/06/2018       I have personallly reviewed all pertinent lab results from the last 24 hours.

## 2018-09-16 PROCEDURE — 25000003 PHARM REV CODE 250: Performed by: STUDENT IN AN ORGANIZED HEALTH CARE EDUCATION/TRAINING PROGRAM

## 2018-09-16 PROCEDURE — 99232 SBSQ HOSP IP/OBS MODERATE 35: CPT | Mod: 25,,, | Performed by: OBSTETRICS & GYNECOLOGY

## 2018-09-16 PROCEDURE — 11000001 HC ACUTE MED/SURG PRIVATE ROOM

## 2018-09-16 PROCEDURE — 25000003 PHARM REV CODE 250: Performed by: OBSTETRICS & GYNECOLOGY

## 2018-09-16 PROCEDURE — 59025 FETAL NON-STRESS TEST: CPT | Mod: 26,,, | Performed by: OBSTETRICS & GYNECOLOGY

## 2018-09-16 PROCEDURE — 63600175 PHARM REV CODE 636 W HCPCS: Performed by: STUDENT IN AN ORGANIZED HEALTH CARE EDUCATION/TRAINING PROGRAM

## 2018-09-16 RX ORDER — METRONIDAZOLE 500 MG/1
500 TABLET ORAL EVERY 8 HOURS
Status: DISCONTINUED | OUTPATIENT
Start: 2018-09-16 | End: 2018-09-16

## 2018-09-16 RX ADMIN — METHYLPREDNISOLONE SODIUM SUCCINATE 16 MG: 40 INJECTION, POWDER, FOR SOLUTION INTRAMUSCULAR; INTRAVENOUS at 10:09

## 2018-09-16 RX ADMIN — METHYLPREDNISOLONE SODIUM SUCCINATE 16 MG: 40 INJECTION, POWDER, FOR SOLUTION INTRAMUSCULAR; INTRAVENOUS at 02:09

## 2018-09-16 RX ADMIN — FAMOTIDINE 20 MG: 20 TABLET ORAL at 08:09

## 2018-09-16 RX ADMIN — PRENATAL VIT W/ FE FUMARATE-FA TAB 27-0.8 MG 1 TABLET: 27-0.8 TAB at 08:09

## 2018-09-16 NOTE — PROGRESS NOTES
RN at bedside.  Patient placed on external fetal monitor for NST. Patient reports feeling contractions. Patient rates contractions a 5 out 10 on the pain scale. Dr. Martinez notified

## 2018-09-16 NOTE — PROGRESS NOTES
Patient reports some nausea this am.   Patient reports Pepcid   Denies emesis or abdominal pain.   Ate breakfast but did not finish  Afebrile, other VSS    Cardiology evaluation: negative     NST/EFM: 130bpm, reactive and reassuring   [place]: occ contractions     Ultrasound on 07-19-18: EFW at 15%, fetal left kidney with mild malrotation of left renal pelvis and proiximal ureter. Patient was to return 3 wks later for f/u ultrasound and no f/u ultrasound is documented.     On admission: urine toxicology with presumptive positive THC     Trichomonas     Urine culture: no growth     Plan:  1. Will order f/u labs today: CMP, LFT's to assess if electrolyte concerns  2. Patient needs f/u ultrasound with M tomorrow for interval fetal growth,       MVP, and f/u of left kidney  3. Will discontinue steroids  4. Pepcid for relief of heartburn  5. NST bid and more frequent/prolonged per clinical status  6. Trichomonas: patient will need treatment   7.  If tolerating regular diet off steroids and with just Pepcid, anticipate discharge hopefully tomorrow.

## 2018-09-16 NOTE — PROGRESS NOTES
Patient resting quietly. Hourly shift rounds performed. Vital signs stable. Patient remained afebrile. Patient on continuous external fetal monitoring.  Patient reports positive fetal movements. Patient denies vaginal bleeding and denies leakage of vaginal fluid. No apparent distress noted. Call light within reach.  No falls or injuries noted.

## 2018-09-17 LAB — HIV 1+2 AB+HIV1 P24 AG SERPL QL IA: NEGATIVE

## 2018-09-17 PROCEDURE — 99232 SBSQ HOSP IP/OBS MODERATE 35: CPT | Mod: 25,,, | Performed by: OBSTETRICS & GYNECOLOGY

## 2018-09-17 PROCEDURE — 76820 UMBILICAL ARTERY ECHO: CPT

## 2018-09-17 PROCEDURE — 11000001 HC ACUTE MED/SURG PRIVATE ROOM

## 2018-09-17 PROCEDURE — 59025 FETAL NON-STRESS TEST: CPT | Mod: 26,,, | Performed by: OBSTETRICS & GYNECOLOGY

## 2018-09-17 PROCEDURE — 25000003 PHARM REV CODE 250: Performed by: OBSTETRICS & GYNECOLOGY

## 2018-09-17 PROCEDURE — 76805 OB US >/= 14 WKS SNGL FETUS: CPT

## 2018-09-17 PROCEDURE — 25000003 PHARM REV CODE 250: Performed by: STUDENT IN AN ORGANIZED HEALTH CARE EDUCATION/TRAINING PROGRAM

## 2018-09-17 RX ORDER — CHLORPROMAZINE HYDROCHLORIDE 25 MG/1
25 TABLET, FILM COATED ORAL EVERY 6 HOURS PRN
Status: DISCONTINUED | OUTPATIENT
Start: 2018-09-17 | End: 2018-09-18 | Stop reason: HOSPADM

## 2018-09-17 RX ORDER — ONDANSETRON 4 MG/1
4 TABLET, ORALLY DISINTEGRATING ORAL EVERY 8 HOURS PRN
Status: DISCONTINUED | OUTPATIENT
Start: 2018-09-17 | End: 2018-09-17

## 2018-09-17 RX ADMIN — FAMOTIDINE 20 MG: 20 TABLET ORAL at 08:09

## 2018-09-17 RX ADMIN — FAMOTIDINE 20 MG: 20 TABLET ORAL at 09:09

## 2018-09-17 RX ADMIN — CHLORPROMAZINE HYDROCHLORIDE 25 MG: 25 TABLET, SUGAR COATED ORAL at 03:09

## 2018-09-17 NOTE — ASSESSMENT & PLAN NOTE
- Continued N/V, resistant to multiple medications throughout pregnancy  - Patient has lost 6 pounds in last 1.5 months  - On admission, amylase/lipase, CBC, CMP wnl except for AST/ALT 47/64  - UA with 2+ Leuk/2+Blood/Trace Ketones/2+Protein/-Nitrites,Mod Trich. Unclear if related to UTI or trichomonas infection. UA/UCx cath with +1 leuks/ +1 ketones, urine culture NGTD  - Unable to use zofran/thorazine/phenergan/compazine due to cardiac side effects  - Was on methylprednisolone 16mg q8 x3 days, which improved nausea. Discontinued today  - Patient started on famotidine BID as this improves symptoms  - Currently tolerating regular diet

## 2018-09-17 NOTE — SUBJECTIVE & OBJECTIVE
Interval Hx:  Patient doing well this AM. Tolerating regular diet without n/v. Denies any chest pain or SOB. Patient complaining of contractions today, visible q4-5 minutes on tocometry.  Denies vaginal bleeding or leakage of fluids and reports good fetal movement.      Obstetric History       T0      L0     SAB0   TAB0   Ectopic0   Multiple0   Live Births0       # Outcome Date GA Lbr Lan/2nd Weight Sex Delivery Anes PTL Lv   1 Current                 History reviewed. No pertinent past medical history.  History reviewed. No pertinent surgical history.    PTA Medications   Medication Sig    calcium-vitamin D 600 mg(1,500mg) -400 unit Tab Take 2 tablets by mouth once daily.    chlorproMAZINE (THORAZINE) 25 MG tablet Take 1 tablet (25 mg total) by mouth 3 (three) times daily. (Patient taking differently: Take 25 mg by mouth 3 (three) times daily. )    famotidine (PEPCID) 20 MG tablet Take 1 tablet (20 mg total) by mouth 2 (two) times daily.    ondansetron (ZOFRAN) 4 MG tablet Take 1 tablet (4 mg total) by mouth every 6 (six) hours as needed for Nausea.    prenatal vit,calc76/iron/folic (PNV 29-1 ORAL) Take 1 tablet by mouth once daily.       Review of patient's allergies indicates:   Allergen Reactions    Codeine         Family History     None        Tobacco Use    Smoking status: Former Smoker     Types: Cigarettes    Smokeless tobacco: Never Used   Substance and Sexual Activity    Alcohol use: No    Drug use: No    Sexual activity: Yes     Review of Systems   Constitutional: Positive for unexpected weight change. Negative for appetite change, chills and fever.   Respiratory: Negative for shortness of breath and wheezing.    Cardiovascular: Negative for chest pain.   Gastrointestinal: Positive for nausea and vomiting. Negative for abdominal pain and diarrhea.   Endocrine: Negative for diabetes, hyperthyroidism and hypothyroidism.   Genitourinary: Negative for dysuria, hematuria, pelvic pain,  vaginal bleeding and vaginal pain.   Neurological: Negative for headaches.   Psychiatric/Behavioral: Negative for depression.      Objective:     Vital Signs (Most Recent):  Temp: 96.5 °F (35.8 °C) (09/16/18 1608)  Pulse: 91 (09/16/18 1610)  Resp: 17 (09/16/18 1608)  BP: 117/75 (09/16/18 1608)  SpO2: 98 % (09/16/18 1610) Vital Signs (24h Range):  Temp:  [96.1 °F (35.6 °C)-96.7 °F (35.9 °C)] 96.5 °F (35.8 °C)  Pulse:  [] 91  Resp:  [16-18] 17  SpO2:  [94 %-100 %] 98 %  BP: (103-130)/(67-75) 117/75     Weight: 71 kg (156 lb 8 oz)  Body mass index is 29.57 kg/m².    FHT: Cat 1 (reassuring) 135 bpm, Mod BTBV, + Acc, - Dec, Reactive/Reassuring   TOCO: Ctx q5-7 minutes now on tocometry  SVE: 1/60/-3    Physical Exam:   Constitutional: She is oriented to person, place, and time. She appears well-developed.     Eyes: EOM are normal.    Neck: Normal range of motion.    Cardiovascular: Normal rate, regular rhythm and normal heart sounds.     Pulmonary/Chest: Effort normal and breath sounds normal. No respiratory distress. She has no wheezes.        Abdominal: Soft. She exhibits no distension. There is no tenderness. There is no rebound and no guarding.             Musculoskeletal: Normal range of motion.       Neurological: She is alert and oriented to person, place, and time.    Skin: Skin is warm and dry.    Psychiatric: She has a normal mood and affect. Her behavior is normal. Judgment and thought content normal.       Cervix:  Deferred     Significant Labs:  Lab Results   Component Value Date    GROUPTRH A POS 09/13/2018    HEPBSAG Negative 04/06/2018       I have personallly reviewed all pertinent lab results from the last 24 hours.

## 2018-09-17 NOTE — ASSESSMENT & PLAN NOTE
- Irregular heartbeat noted on physical exam; patient complained of chest pain relieved by bicitra.  - Conduction abnormality likely 2/2 prolonged use of antiemetics zofran and thorazine  - Troponin negative x 3 on admission  - EKG with isolated ventricular ectopic beats, and is otherwise within normal limits.  Followup electrocardiogram is also within normal limits.  - Have d/chiki zofran/thorazine, to avoid medications with arrhythmia side effects    - will discuss use of anti-emetics and safety with cardiology  - Continuous telemetry  - Per cardiology recs, may reinstate pepcid as symptoms of chest discomfort likely secondary to reflux

## 2018-09-17 NOTE — PROGRESS NOTES
"[facility]  Obstetrics  Antepartum Progress Note    Patient Name: Daija Purvis  MRN: [id number]  Admission Date: 2018  Hospital Length of Stay: 3 days  Attending Physician: Adeline Boothe MD  Primary Care Provider: Lizet Abreu MD    Subjective:     Principal Problem:Hyperemesis gravidarum with dehydration    HPI:  Daija Purvis is a 28 y.o. F at 34w4d presents complaining of nausea and vomiting.  Patient reports nausea and vomiting this entire pregnancy. She has been admitted for hyperemesis twice earlier this pregnancy. She has been resistant to several antiemetics but was eventually controlled on zofran and thorazine. Patient reports her N/V returned 5 days ago, reporting inability to tolerate both food and liquids. She believes she has had a 15lb weight loss in the last 1.5 months. She states she has overall lost weight this pregnancy.  She denies any fever or chills, denies sick contacts, denies diarrhea, denies urinary complaints. She reports midline abdominal discomfort.   Patient denies CTXs, VB, LOF, reports good FM.      During examination, patient was noted to have irregular heartbeat. F/U EKG revealed  PVCs in a pattern of bigeminy.  Of note, there was concern for possible STEMI earlier in this pregnancy that was eventually ruled out.  Patient currently does report midline chest pain, is unsure if this is related to vomiting.  She states the chest pain started about 1 hour ago.  She states the pain does not radiate to her jaw, neck, arm, or back. She is not sweating. She states the pain feels like "somebody punched me.".         Hospital Course:  2018 Patient seen in [place] for N/V. Workup notable for irregular heart beat, found to have PVCs in pattern of bigeminy, likely 2/2 prolonged use of zofran and thorazine.  Patient admitted for continued evaluation and management. Plan to stop all antiemetics, trial methylprednisilone. Continuous telemetry, troponin " negative. Consult cardiology in the AM.   09/15/2018 Cardiology consulted, troponoins negative x 3, and repeat EKG with isolated vetricular beats but otherwise normal, maternal echocardiogram, EF 65-75%. Per cardiology recs, resume Pepcid as symptoms likely due to refulx.  Fetal NST reactive and reassuring.  Patient with continued N/V, on methylprednisolone.   2018 Patient doing well this AM. Tolerating regular diet without n/v. Steroids discontinued, will continue Pepcid as this relieves her symptoms. Patient complaining of contractions today, visible q4-5 minutes on tocometry. No change on SVE.   2018 Patient continues to complain of n/v. No further chest pain with pepcid. Will discuss anti-emetic medication use and safety with cardiology. Will obtain f/u of fetal growth today.    Interval Hx:  Patient continues to complain of n/v. No further chest pain with pepcid. Patient complaining of continued contractions today, now intermittent but up to q10 minutes overnight. Denies vaginal bleeding or leakage of fluids and reports good fetal movement.      Obstetric History       T0      L0     SAB0   TAB0   Ectopic0   Multiple0   Live Births0       # Outcome Date GA Lbr Lan/2nd Weight Sex Delivery Anes PTL Lv   1 Current                 History reviewed. No pertinent past medical history.  History reviewed. No pertinent surgical history.    PTA Medications   Medication Sig    calcium-vitamin D 600 mg(1,500mg) -400 unit Tab Take 2 tablets by mouth once daily.    chlorproMAZINE (THORAZINE) 25 MG tablet Take 1 tablet (25 mg total) by mouth 3 (three) times daily. (Patient taking differently: Take 25 mg by mouth 3 (three) times daily. )    famotidine (PEPCID) 20 MG tablet Take 1 tablet (20 mg total) by mouth 2 (two) times daily.    ondansetron (ZOFRAN) 4 MG tablet Take 1 tablet (4 mg total) by mouth every 6 (six) hours as needed for Nausea.    prenatal vit,calc76/iron/folic (PNV 29-1 ORAL) Take  1 tablet by mouth once daily.       Review of patient's allergies indicates:   Allergen Reactions    Codeine         Family History     None        Tobacco Use    Smoking status: Former Smoker     Types: Cigarettes    Smokeless tobacco: Never Used   Substance and Sexual Activity    Alcohol use: No    Drug use: No    Sexual activity: Yes        Objective:     Vital Signs (Most Recent):  Temp: 97.6 °F (36.4 °C) (09/17/18 0405)  Pulse: 74 (09/17/18 0630)  Resp: 17 (09/17/18 0405)  BP: 99/71 (09/17/18 0405)  SpO2: 97 % (09/17/18 0630) Vital Signs (24h Range):  Temp:  [96.1 °F (35.6 °C)-97.6 °F (36.4 °C)] 97.6 °F (36.4 °C)  Pulse:  [] 74  Resp:  [17-18] 17  SpO2:  [94 %-100 %] 97 %  BP: ()/(69-75) 99/71     Weight: 69.4 kg (153 lb 1.6 oz)  Body mass index is 28.93 kg/m².    FHT: Cat 1 (reassuring) 135 bpm, Mod BTBV, + Acc, - Dec, Reactive/Reassuring   TOCO: No contractions currently, last visible ~  2am q4-5 min   SVE: 1/60/-3    Physical Exam:   Constitutional: She is oriented to person, place, and time. She appears well-developed.     Eyes: EOM are normal.    Neck: Normal range of motion.    Cardiovascular: Normal rate, regular rhythm and normal heart sounds.     Pulmonary/Chest: Effort normal and breath sounds normal. No respiratory distress. She has no wheezes.        Abdominal: Soft. She exhibits no distension. There is no tenderness. There is no rebound and no guarding.             Musculoskeletal: Normal range of motion.       Neurological: She is alert and oriented to person, place, and time.    Skin: Skin is warm and dry.    Psychiatric: She has a normal mood and affect. Her behavior is normal. Judgment and thought content normal.       Cervix:  Deferred     Significant Labs:  Lab Results   Component Value Date    GROUPTRH A POS 09/13/2018    HEPBSAG Negative 04/06/2018       I have personallly reviewed all pertinent lab results from the last 24 hours.    Assessment/Plan:     28 y.o. female   at 35w1d for:    * Hyperemesis gravidarum with dehydration    - Continued N/V, resistant to multiple medications throughout pregnancy  - Patient has lost 6 pounds in last 1.5 months  - On admission, amylase/lipase, CBC, CMP wnl except for AST/ALT 47/64  - UA with 2+ Leuk/2+Blood/Trace Ketones/2+Protein/-Nitrites,Mod Trich. Unclear if related to UTI or trichomonas infection.    - UA/UCx cath with +1 leuks/ +1 ketones, urine culture NGTD   - will treat trichomonas with vaginal flagyl  - Was on methylprednisolone 16mg q8 x3 days, which improved nausea. Discontinued today  - Patient started on famotidine BID as this improves symptoms  - Currently tolerating regular diet, though she reports difficulty eating full meal  - Have not been using zofran/thorazine/phenergan/compazine due to cardiac side effects   - Will d/w cardiology what anti-emetics are safe to use in this pt.           Ventricular bigeminy    - Irregular heartbeat noted on physical exam; patient complained of chest pain relieved by bicitra.  - Conduction abnormality likely 2/2 prolonged use of antiemetics zofran and thorazine  - Troponin negative x 3 on admission  - EKG with isolated ventricular ectopic beats, and is otherwise within normal limits.  Followup electrocardiogram is also within normal limits.  - Have d/chiki zofran/thorazine, to avoid medications with arrhythmia side effects    - will discuss use of anti-emetics and safety with cardiology  - Continuous telemetry  - Per cardiology recs, may reinstate pepcid as symptoms of chest discomfort likely secondary to reflux        Pregnancy, supervision, high-risk    - Continue PNV  - T3 labs ordered   - RPR NR, HIV neg  - Consider GBS while inpatient  - Having contractions.    - SVE  unchanged from previous one 1 week ago (/-3).    - No vaginal bleeding/LOF  - NST BID   - on continuous overnight 2/2 contractions  - Last growth  with  g 15% [place]   - Needs repeat growth U/S,  will get with MFM today        Trichomoniasis    - Seen on UA  - Patient treated earlier in pregnancy, unsure if her partner has been treated  - Held flagyl given nauseating SEs, will administer now that N/V better controlled   - will Rx with vaginal flagyl              Sushma K Reddy, MD  Obstetrics  [facility]

## 2018-09-17 NOTE — ASSESSMENT & PLAN NOTE
- Continue PNV  - T3 labs ordered, RPR NR, HIV pending  - Consider GBS while inpatient  - Having contractions. SVE at 4:30 today was unchanged from previous one 1 week ago (1/60/-3). No vaginal bleeding/LOF  - Continuous monitoring  - Last growth 7/19 with  g 15% [place]  - Needs repeat growth U/S, will get with MFM tomorrow (ordered)

## 2018-09-17 NOTE — ASSESSMENT & PLAN NOTE
- Continue PNV  - T3 labs ordered   - RPR NR, HIV neg  - Consider GBS while inpatient  - Having contractions.    - SVE 9/16 unchanged from previous one 1 week ago (1/60/-3).    - No vaginal bleeding/LOF  - NST BID   - on continuous overnight 2/2 contractions  - Last growth 7/19 with  g 15% [place]   - Needs repeat growth U/S, will get with MFM today

## 2018-09-17 NOTE — ASSESSMENT & PLAN NOTE
- Continued N/V, resistant to multiple medications throughout pregnancy  - Patient has lost 6 pounds in last 1.5 months  - On admission, amylase/lipase, CBC, CMP wnl except for AST/ALT 47/64  - UA with 2+ Leuk/2+Blood/Trace Ketones/2+Protein/-Nitrites,Mod Trich. Unclear if related to UTI or trichomonas infection.    - UA/UCx cath with +1 leuks/ +1 ketones, urine culture NGTD   - will treat trichomonas with vaginal flagyl  - Was on methylprednisolone 16mg q8 x3 days, which improved nausea. Discontinued today  - Patient started on famotidine BID as this improves symptoms  - Currently tolerating regular diet, though she reports difficulty eating full meal  - Have not been using zofran/thorazine/phenergan/compazine due to cardiac side effects   - Will d/w cardiology what anti-emetics are safe to use in this pt.

## 2018-09-17 NOTE — ASSESSMENT & PLAN NOTE
- Irregular heartbeat noted on physical exam; patient complained of chest pain relieved by bicitra.  - Conduction abnormality likely 2/2 prolonged use of antiemetics zofran and thorazine  - Troponin negative x 3 on admission  - EKG with isolated ventricular ectopic beats, and is otherwise within normal limits.  Followup electrocardiogram is also within normal limits.  - Will D/C zofran/thorazine, avoid medications with arrhythmia side effects  - Continuous telemetry  - Per cardiology recs, may reinstate pepcid as symptoms of chest discomfort likely secondary to reflux

## 2018-09-17 NOTE — SUBJECTIVE & OBJECTIVE
Interval Hx:  Patient continues to complain of n/v. No further chest pain with pepcid. Patient complaining of continued contractions today, now intermittent but up to q10 minutes overnight. Denies vaginal bleeding or leakage of fluids and reports good fetal movement.      Obstetric History       T0      L0     SAB0   TAB0   Ectopic0   Multiple0   Live Births0       # Outcome Date GA Lbr Lan/2nd Weight Sex Delivery Anes PTL Lv   1 Current                 History reviewed. No pertinent past medical history.  History reviewed. No pertinent surgical history.    PTA Medications   Medication Sig    calcium-vitamin D 600 mg(1,500mg) -400 unit Tab Take 2 tablets by mouth once daily.    chlorproMAZINE (THORAZINE) 25 MG tablet Take 1 tablet (25 mg total) by mouth 3 (three) times daily. (Patient taking differently: Take 25 mg by mouth 3 (three) times daily. )    famotidine (PEPCID) 20 MG tablet Take 1 tablet (20 mg total) by mouth 2 (two) times daily.    ondansetron (ZOFRAN) 4 MG tablet Take 1 tablet (4 mg total) by mouth every 6 (six) hours as needed for Nausea.    prenatal vit,calc76/iron/folic (PNV 29-1 ORAL) Take 1 tablet by mouth once daily.       Review of patient's allergies indicates:   Allergen Reactions    Codeine         Family History     None        Tobacco Use    Smoking status: Former Smoker     Types: Cigarettes    Smokeless tobacco: Never Used   Substance and Sexual Activity    Alcohol use: No    Drug use: No    Sexual activity: Yes        Objective:     Vital Signs (Most Recent):  Temp: 97.6 °F (36.4 °C) (18 0405)  Pulse: 74 (18 0630)  Resp: 17 (18 0405)  BP: 99/71 (18 0405)  SpO2: 97 % (18 0630) Vital Signs (24h Range):  Temp:  [96.1 °F (35.6 °C)-97.6 °F (36.4 °C)] 97.6 °F (36.4 °C)  Pulse:  [] 74  Resp:  [17-18] 17  SpO2:  [94 %-100 %] 97 %  BP: ()/(69-75) 99/71     Weight: 69.4 kg (153 lb 1.6 oz)  Body mass index is 28.93 kg/m².    FHT: Cat  1 (reassuring) 135 bpm, Mod BTBV, + Acc, - Dec, Reactive/Reassuring   TOCO: No contractions currently, last visible ~  2am q4-5 min   SVE: 1/60/-3    Physical Exam:   Constitutional: She is oriented to person, place, and time. She appears well-developed.     Eyes: EOM are normal.    Neck: Normal range of motion.    Cardiovascular: Normal rate, regular rhythm and normal heart sounds.     Pulmonary/Chest: Effort normal and breath sounds normal. No respiratory distress. She has no wheezes.        Abdominal: Soft. She exhibits no distension. There is no tenderness. There is no rebound and no guarding.             Musculoskeletal: Normal range of motion.       Neurological: She is alert and oriented to person, place, and time.    Skin: Skin is warm and dry.    Psychiatric: She has a normal mood and affect. Her behavior is normal. Judgment and thought content normal.       Cervix:  Deferred     Significant Labs:  Lab Results   Component Value Date    GROUPTRH A POS 09/13/2018    HEPBSAG Negative 04/06/2018       I have personallly reviewed all pertinent lab results from the last 24 hours.

## 2018-09-17 NOTE — ASSESSMENT & PLAN NOTE
- Seen on UA  - Patient treated earlier in pregnancy, unsure if her partner has been treated  - Held flagyl given nauseating SEs, will administer now that N/V better controlled   - will Rx with vaginal flagyl

## 2018-09-17 NOTE — PROGRESS NOTES
"[facility]  Obstetrics  Antepartum Progress Note    Patient Name: Daija Purvis  MRN: [id number]  Admission Date: 2018  Hospital Length of Stay: 2 days  Attending Physician: Adeline Boothe MD  Primary Care Provider: Lizet Abreu MD    Subjective:     Principal Problem:Hyperemesis gravidarum with dehydration    HPI:  Daija Purvis is a 28 y.o. F at 34w4d presents complaining of nausea and vomiting.  Patient reports nausea and vomiting this entire pregnancy. She has been admitted for hyperemesis twice earlier this pregnancy. She has been resistant to several antiemetics but was eventually controlled on zofran and thorazine. Patient reports her N/V returned 5 days ago, reporting inability to tolerate both food and liquids. She believes she has had a 15lb weight loss in the last 1.5 months. She states she has overall lost weight this pregnancy.  She denies any fever or chills, denies sick contacts, denies diarrhea, denies urinary complaints. She reports midline abdominal discomfort.   Patient denies CTXs, VB, LOF, reports good FM.      During examination, patient was noted to have irregular heartbeat. F/U EKG revealed  PVCs in a pattern of bigeminy.  Of note, there was concern for possible STEMI earlier in this pregnancy that was eventually ruled out.  Patient currently does report midline chest pain, is unsure if this is related to vomiting.  She states the chest pain started about 1 hour ago.  She states the pain does not radiate to her jaw, neck, arm, or back. She is not sweating. She states the pain feels like "somebody punched me.".         Hospital Course:  2018 Patient seen in [place] for N/V. Workup notable for irregular heart beat, found to have PVCs in pattern of bigeminy, likely 2/2 prolonged use of zofran and thorazine.  Patient admitted for continued evaluation and management. Plan to stop all antiemetics, trial methylprednisilone. Continuous telemetry, troponin " negative. Consult cardiology in the AM.   09/15/2018 Cardiology consulted, troponoins negative x 3, and repeat EKG with isolated vetricular beats but otherwise normal, maternal echocardiogram, EF 65-75%. Per cardiology recs, resume Pepcid as symptoms likely due to refulx.  Fetal NST reactive and reassuring.  Patient with continued N/V, on methylprednisolone.   2018 Patient doing well this AM. Tolerating regular diet without n/v. Steroids discontinued, will continue Pepcid as this relieves her symptoms. Patient complaining of contractions today, visible q4-5 minutes on tocometry. No change on SVE.     Interval Hx:  Patient doing well this AM. Tolerating regular diet without n/v. Denies any chest pain or SOB. Patient complaining of contractions today, visible q4-5 minutes on tocometry.  Denies vaginal bleeding or leakage of fluids and reports good fetal movement.      Obstetric History       T0      L0     SAB0   TAB0   Ectopic0   Multiple0   Live Births0       # Outcome Date GA Lbr Lan/2nd Weight Sex Delivery Anes PTL Lv   1 Current                 History reviewed. No pertinent past medical history.  History reviewed. No pertinent surgical history.    PTA Medications   Medication Sig    calcium-vitamin D 600 mg(1,500mg) -400 unit Tab Take 2 tablets by mouth once daily.    chlorproMAZINE (THORAZINE) 25 MG tablet Take 1 tablet (25 mg total) by mouth 3 (three) times daily. (Patient taking differently: Take 25 mg by mouth 3 (three) times daily. )    famotidine (PEPCID) 20 MG tablet Take 1 tablet (20 mg total) by mouth 2 (two) times daily.    ondansetron (ZOFRAN) 4 MG tablet Take 1 tablet (4 mg total) by mouth every 6 (six) hours as needed for Nausea.    prenatal vit,calc76/iron/folic (PNV 29-1 ORAL) Take 1 tablet by mouth once daily.       Review of patient's allergies indicates:   Allergen Reactions    Codeine         Family History     None        Tobacco Use    Smoking status: Former Smoker      Types: Cigarettes    Smokeless tobacco: Never Used   Substance and Sexual Activity    Alcohol use: No    Drug use: No    Sexual activity: Yes     Review of Systems   Constitutional: Positive for unexpected weight change. Negative for appetite change, chills and fever.   Respiratory: Negative for shortness of breath and wheezing.    Cardiovascular: Negative for chest pain.   Gastrointestinal: Positive for nausea and vomiting. Negative for abdominal pain and diarrhea.   Endocrine: Negative for diabetes, hyperthyroidism and hypothyroidism.   Genitourinary: Negative for dysuria, hematuria, pelvic pain, vaginal bleeding and vaginal pain.   Neurological: Negative for headaches.   Psychiatric/Behavioral: Negative for depression.      Objective:     Vital Signs (Most Recent):  Temp: 96.5 °F (35.8 °C) (09/16/18 1608)  Pulse: 91 (09/16/18 1610)  Resp: 17 (09/16/18 1608)  BP: 117/75 (09/16/18 1608)  SpO2: 98 % (09/16/18 1610) Vital Signs (24h Range):  Temp:  [96.1 °F (35.6 °C)-96.7 °F (35.9 °C)] 96.5 °F (35.8 °C)  Pulse:  [] 91  Resp:  [16-18] 17  SpO2:  [94 %-100 %] 98 %  BP: (103-130)/(67-75) 117/75     Weight: 71 kg (156 lb 8 oz)  Body mass index is 29.57 kg/m².    FHT: Cat 1 (reassuring) 135 bpm, Mod BTBV, + Acc, - Dec, Reactive/Reassuring   TOCO: Ctx q5-7 minutes now on tocometry  SVE: 1/60/-3    Physical Exam:   Constitutional: She is oriented to person, place, and time. She appears well-developed.     Eyes: EOM are normal.    Neck: Normal range of motion.    Cardiovascular: Normal rate, regular rhythm and normal heart sounds.     Pulmonary/Chest: Effort normal and breath sounds normal. No respiratory distress. She has no wheezes.        Abdominal: Soft. She exhibits no distension. There is no tenderness. There is no rebound and no guarding.             Musculoskeletal: Normal range of motion.       Neurological: She is alert and oriented to person, place, and time.    Skin: Skin is warm and dry.     Psychiatric: She has a normal mood and affect. Her behavior is normal. Judgment and thought content normal.       Cervix:  Deferred     Significant Labs:  Lab Results   Component Value Date    GROUPTRH A POS 2018    HEPBSAG Negative 2018       I have personallly reviewed all pertinent lab results from the last 24 hours.    Assessment/Plan:     28 y.o. female  at 35w0d for:    * Hyperemesis gravidarum with dehydration    - Continued N/V, resistant to multiple medications throughout pregnancy  - Patient has lost 6 pounds in last 1.5 months  - On admission, amylase/lipase, CBC, CMP wnl except for AST/ALT 47/64  - UA with 2+ Leuk/2+Blood/Trace Ketones/2+Protein/-Nitrites,Mod Trich. Unclear if related to UTI or trichomonas infection. UA/UCx cath with +1 leuks/ +1 ketones, urine culture NGTD  - Unable to use zofran/thorazine/phenergan/compazine due to cardiac side effects  - Was on methylprednisolone 16mg q8 x3 days, which improved nausea. Discontinued today  - Patient started on famotidine BID as this improves symptoms  - Currently tolerating regular diet          Ventricular bigeminy    - Irregular heartbeat noted on physical exam; patient complained of chest pain relieved by bicitra.  - Conduction abnormality likely 2/2 prolonged use of antiemetics zofran and thorazine  - Troponin negative x 3 on admission  - EKG with isolated ventricular ectopic beats, and is otherwise within normal limits.  Followup electrocardiogram is also within normal limits.  - Will D/C zofran/thorazine, avoid medications with arrhythmia side effects  - Continuous telemetry  - Per cardiology recs, may reinstate pepcid as symptoms of chest discomfort likely secondary to reflux        Pregnancy, supervision, high-risk    - Continue PNV  - T3 labs ordered, RPR NR, HIV pending  - Consider GBS while inpatient  - Having contractions. SVE at 4:30 today was unchanged from previous one 1 week ago (/-3). No vaginal bleeding/LOF  -  Continuous monitoring  - Last growth 7/19 with  g 15% [place]  - Needs repeat growth U/S, will get with MFM tomorrow (ordered)        Trichomoniasis    - Seen on UA  - Patient treated earlier in pregnancy, unsure if her partner has been treated  - Held flagyl given nauseating SEs, will administer now that N/V better controlled              Sushma K Reddy, MD  Obstetrics  [facility]

## 2018-09-18 VITALS
RESPIRATION RATE: 18 BRPM | TEMPERATURE: 97 F | HEART RATE: 86 BPM | DIASTOLIC BLOOD PRESSURE: 53 MMHG | HEIGHT: 61 IN | SYSTOLIC BLOOD PRESSURE: 92 MMHG | OXYGEN SATURATION: 98 % | BODY MASS INDEX: 29.36 KG/M2 | WEIGHT: 155.5 LBS

## 2018-09-18 PROCEDURE — 25000003 PHARM REV CODE 250: Performed by: OBSTETRICS & GYNECOLOGY

## 2018-09-18 PROCEDURE — 99238 HOSP IP/OBS DSCHRG MGMT 30/<: CPT | Mod: ,,, | Performed by: OBSTETRICS & GYNECOLOGY

## 2018-09-18 PROCEDURE — 25000003 PHARM REV CODE 250: Performed by: STUDENT IN AN ORGANIZED HEALTH CARE EDUCATION/TRAINING PROGRAM

## 2018-09-18 PROCEDURE — 87081 CULTURE SCREEN ONLY: CPT

## 2018-09-18 RX ORDER — FAMOTIDINE 20 MG/1
20 TABLET, FILM COATED ORAL 2 TIMES DAILY
Qty: 60 TABLET | Refills: 4 | Status: SHIPPED | OUTPATIENT
Start: 2018-09-18 | End: 2019-01-16

## 2018-09-18 RX ORDER — DOCUSATE SODIUM 100 MG/1
100 CAPSULE, LIQUID FILLED ORAL DAILY
Status: DISCONTINUED | OUTPATIENT
Start: 2018-09-18 | End: 2018-09-18 | Stop reason: HOSPADM

## 2018-09-18 RX ORDER — CHLORPROMAZINE HYDROCHLORIDE 25 MG/1
25 TABLET, FILM COATED ORAL 3 TIMES DAILY
Qty: 90 TABLET | Refills: 5 | Status: ON HOLD | OUTPATIENT
Start: 2018-09-18 | End: 2018-10-01 | Stop reason: HOSPADM

## 2018-09-18 RX ADMIN — DOCUSATE SODIUM 100 MG: 100 CAPSULE, LIQUID FILLED ORAL at 08:09

## 2018-09-18 RX ADMIN — CHLORPROMAZINE HYDROCHLORIDE 25 MG: 25 TABLET, SUGAR COATED ORAL at 06:09

## 2018-09-18 RX ADMIN — FAMOTIDINE 20 MG: 20 TABLET ORAL at 08:09

## 2018-09-18 RX ADMIN — PRENATAL VIT W/ FE FUMARATE-FA TAB 27-0.8 MG 1 TABLET: 27-0.8 TAB at 08:09

## 2018-09-18 NOTE — DISCHARGE SUMMARY
"[facility]  Obstetrics  Discharge Summary      Patient Name: Daija Purvis  MRN: [id number]  Admission Date: 2018  Hospital Length of Stay: 4 days  Discharge Date and Time:  2018 10:55 AM  Attending Physician: Adeline Boothe MD   Discharging Provider: Sushma K Reddy, MD  Primary Care Provider: Lizet Abreu MD    HPI: Daija Purvis is a 28 y.o. F at 34w4d presents complaining of nausea and vomiting.  Patient reports nausea and vomiting this entire pregnancy. She has been admitted for hyperemesis twice earlier this pregnancy. She has been resistant to several antiemetics but was eventually controlled on zofran and thorazine. Patient reports her N/V returned 5 days ago, reporting inability to tolerate both food and liquids. She believes she has had a 15lb weight loss in the last 1.5 months. She states she has overall lost weight this pregnancy.  She denies any fever or chills, denies sick contacts, denies diarrhea, denies urinary complaints. She reports midline abdominal discomfort.   Patient denies CTXs, VB, LOF, reports good FM.      During examination, patient was noted to have irregular heartbeat. F/U EKG revealed  PVCs in a pattern of bigeminy.  Of note, there was concern for possible STEMI earlier in this pregnancy that was eventually ruled out.  Patient currently does report midline chest pain, is unsure if this is related to vomiting.  She states the chest pain started about 1 hour ago.  She states the pain does not radiate to her jaw, neck, arm, or back. She is not sweating. She states the pain feels like "somebody punched me.".         * No surgery found *     Hospital Course:   2018 Patient seen in [place] for N/V. Workup notable for irregular heart beat, found to have PVCs in pattern of bigeminy, likely 2/2 prolonged use of zofran and thorazine.  Patient admitted for continued evaluation and management. Plan to stop all antiemetics, trial methylprednisilone. " Continuous telemetry, troponin negative. Consult cardiology in the AM.   09/15/2018 Cardiology consulted, troponoins negative x 3, and repeat EKG with isolated vetricular beats but otherwise normal, maternal echocardiogram, EF 65-75%. Per cardiology recs, resume Pepcid as symptoms likely due to refulx.  Fetal NST reactive and reassuring.  Patient with continued N/V, on methylprednisolone.   09/16/2018 Patient doing well this AM. Tolerating regular diet without n/v. Steroids discontinued, will continue Pepcid as this relieves her symptoms. Patient complaining of contractions today, visible q4-5 minutes on tocometry. No change on SVE.   09/17/2018 Patient continues to complain of n/v. No further chest pain with pepcid. Will discuss anti-emetic medication use and safety with cardiology. Will obtain f/u of fetal growth today. D/w cardiology, pt ok to take Zofran/Thorazine.  09/18/2018 Patient reports tolerating broth last night. Thorazine has been helping somewhat. She had nausea with apple sauce last night. Has been eating minimally. Also reports a pain/pressure in her lower abdomen that started last night. Not relieved by urinating; she denies dysuria. 1cm dilated on SVE, same as previously. Discussed that she should try high calorie drinks if she is able to tolerate fluids better than solids by mouth. Discussed that due to IUGR otherwise uncomplicated, patient will need delivery at 37 weeks. Discussed that she will need prenatal testing twice weekly and weekly dopplers and BPP. Discharged with anti-emetics and pepcid. Patient to follow up in 1 week in clinic.    Consults (From admission, onward)        Status Ordering Provider     Inpatient consult to Cardiology  Once     Provider:  Siddharth Bhansali, MD Completed REDDY, SUSHMA K          Final Active Diagnoses:    Diagnosis Date Noted POA    PRINCIPAL PROBLEM:  Hyperemesis gravidarum with dehydration [O21.1] 04/06/2018 Yes    Irregular heart beat [I49.9]  09/14/2018 Yes    Ventricular bigeminy [I49.9] 09/14/2018 Yes    Pregnancy with 34 completed weeks gestation [Z3A.34]  Not Applicable    PVCs (premature ventricular contractions) [I49.3]  Yes    Pregnancy, supervision, high-risk [O09.90] 05/29/2018 Not Applicable    Trichomoniasis [A59.9] 04/06/2018 Yes      Problems Resolved During this Admission:     Immunizations     None          This patient has no babies on file.  Pending Diagnostic Studies:     None          Discharged Condition: good    Disposition: Home or Self Care    Follow Up:  Follow-up Information     [place] [facility] In 1 week.    Specialty:  Obstetrics and Gynecology  Why:  Follow up visit  Contact information:  [address] [facility] [address] [phone]               Patient Instructions:      Notify your health care provider if you experience any of the following:   Order Comments: Inability to tolerate food or fluids by mouth  Persistent nausea and vomiting with inability to keep food or liquids down  Fevers/chills  Contractions that are regular or painful  Leakage of fluids like you broke your water  Vaginal bleeding more than spotting  Decreased fetal movement     Notify your health care provider if you experience any of the following:  severe persistent headache     Notify your health care provider if you experience any of the following:  temperature >100.4     Notify your health care provider if you experience any of the following:  persistent nausea and vomiting or diarrhea     Activity as tolerated     Medications:  Current Discharge Medication List      CONTINUE these medications which have CHANGED    Details   chlorproMAZINE (THORAZINE) 25 MG tablet Take 1 tablet (25 mg total) by mouth 3 (three) times daily.  Qty: 90 tablet, Refills: 5      famotidine (PEPCID) 20 MG tablet Take 1 tablet (20 mg total) by mouth 2 (two) times daily.  Qty: 60 tablet, Refills: 4    Associated Diagnoses: Hyperemesis gravidarum          CONTINUE these medications which have NOT CHANGED    Details   calcium-vitamin D 600 mg(1,500mg) -400 unit Tab Take 2 tablets by mouth once daily.  Qty: 30 tablet, Refills: 3      ondansetron (ZOFRAN) 4 MG tablet Take 1 tablet (4 mg total) by mouth every 6 (six) hours as needed for Nausea.  Qty: 60 tablet, Refills: 1      prenatal vit,calc76/iron/folic (PNV 29-1 ORAL) Take 1 tablet by mouth once daily.             Sushma K Reddy, MD  Obstetrics  [facility]

## 2018-09-18 NOTE — PLAN OF CARE
Problem: Patient Care Overview  Goal: Plan of Care Review  Outcome: Ongoing (interventions implemented as appropriate)  Pt. Denies leakage of fluid, presence of contractions, and vaginal bleeding. Pt strip reactive. VS stable and pt is resting comfortably. Telemetry monitor in place. Pt complains of intermittent nausea. Will continue to monitor.

## 2018-09-18 NOTE — ASSESSMENT & PLAN NOTE
- Continued N/V, resistant to multiple medications throughout pregnancy  - Patient has lost 6 pounds in last 1.5 months  - On admission, amylase/lipase, CBC, CMP wnl except for AST/ALT 47/64  - UA with 2+ Leuk/2+Blood/Trace Ketones/2+Protein/-Nitrites,Mod Trich. Unclear if related to UTI or trichomonas infection.    - UA/UCx cath with +1 leuks/ +1 ketones, urine culture NGTD   - vaginal flagyl not available to treat trichomonas - will treat with clindamycin as flagyl will worsen n/v  - Was on methylprednisolone 16mg q8 x3 days, which improved nausea. Discontinued today  - Patient started on famotidine BID as this improves symptoms  - Currently tolerating regular diet, though she reports difficulty eating full meal 2/2 dec appetite   - discussed with patient that she should try high calorie drinks on discharge  - Have not been using zofran/thorazine/phenergan/compazine due to cardiac side effects   - Zofran/ thorazine OK per cards

## 2018-09-18 NOTE — DISCHARGE INSTRUCTIONS
Call L&D, [phone], for abdominal pain/cramping, vaginal bleeding, water bag ruptures, decreased of no fetal movement, continued vomiting, change in vomiting--blood, green, etc; severe indigestion pain; eat small meals, bland food; keep appointments with prenatal testing, doctor

## 2018-09-18 NOTE — SUBJECTIVE & OBJECTIVE
Interval Hx:  Patient reports tolerating broth last night. Thorazine has been helping somewhat. She had nausea with apple sauce last night. Has been eating minimally. Also reports a pain/pressure in her lower abdomen that started last night. Not relieved by urinating; she denies dysuria. Has not had BM since admission.   Denies chest pain, SOB, or labor symptoms. +FM    Obstetric History       T0      L0     SAB0   TAB0   Ectopic0   Multiple0   Live Births0       # Outcome Date GA Lbr Lan/2nd Weight Sex Delivery Anes PTL Lv   1 Current                 History reviewed. No pertinent past medical history.  History reviewed. No pertinent surgical history.    PTA Medications   Medication Sig    calcium-vitamin D 600 mg(1,500mg) -400 unit Tab Take 2 tablets by mouth once daily.    chlorproMAZINE (THORAZINE) 25 MG tablet Take 1 tablet (25 mg total) by mouth 3 (three) times daily. (Patient taking differently: Take 25 mg by mouth 3 (three) times daily. )    famotidine (PEPCID) 20 MG tablet Take 1 tablet (20 mg total) by mouth 2 (two) times daily.    ondansetron (ZOFRAN) 4 MG tablet Take 1 tablet (4 mg total) by mouth every 6 (six) hours as needed for Nausea.    prenatal vit,calc76/iron/folic (PNV 29-1 ORAL) Take 1 tablet by mouth once daily.       Review of patient's allergies indicates:   Allergen Reactions    Codeine         Family History     None        Tobacco Use    Smoking status: Former Smoker     Types: Cigarettes    Smokeless tobacco: Never Used   Substance and Sexual Activity    Alcohol use: No    Drug use: No    Sexual activity: Yes        Objective:     Vital Signs (Most Recent):  Temp: 97.1 °F (36.2 °C) (18 042)  Pulse: 82 (18 0600)  Resp: 18 (18)  BP: (!) 92/53 (18 0420)  SpO2: 98 % (18) Vital Signs (24h Range):  Temp:  [97.1 °F (36.2 °C)-98.1 °F (36.7 °C)] 97.1 °F (36.2 °C)  Pulse:  [46-98] 82  Resp:  [16-18] 18  SpO2:  [96 %-100 %] 98 %  BP:  ()/(53-71) 92/53     Weight: 70.5 kg (155 lb 8 oz)  Body mass index is 29.38 kg/m².    FHT: Cat 1 (reassuring) 135 bpm, Mod BTBV, + Acc, - Dec, Reactive/Reassuring   TOCO: No contractions currently, last visible ~  2am q4-5 min   SVE: 1/80/-2 on 9/18 @ 0700    Physical Exam:   Constitutional: She is oriented to person, place, and time. She appears well-developed.     Eyes: EOM are normal.    Neck: Normal range of motion.    Cardiovascular: Normal rate, regular rhythm and normal heart sounds.     Pulmonary/Chest: Effort normal and breath sounds normal. No respiratory distress. She has no wheezes.        Abdominal: Soft. She exhibits no distension. There is no tenderness. There is no rebound and no guarding.             Musculoskeletal: Normal range of motion.       Neurological: She is alert and oriented to person, place, and time.    Skin: Skin is warm and dry.    Psychiatric: She has a normal mood and affect. Her behavior is normal. Judgment and thought content normal.       Cervix:  Deferred     Significant Labs:  Lab Results   Component Value Date    GROUPTRH A POS 09/13/2018    HEPBSAG Negative 04/06/2018       I have personallly reviewed all pertinent lab results from the last 24 hours.

## 2018-09-18 NOTE — PROGRESS NOTES
"[facility]  Obstetrics  Antepartum Progress Note    Patient Name: Daija Purvis  MRN: [id number]  Admission Date: 2018  Hospital Length of Stay: 4 days  Attending Physician: Adeline Boothe MD  Primary Care Provider: Lizet Abreu MD    Subjective:     Principal Problem:Hyperemesis gravidarum with dehydration    HPI:  Daija Purvis is a 28 y.o. F at 34w4d presents complaining of nausea and vomiting.  Patient reports nausea and vomiting this entire pregnancy. She has been admitted for hyperemesis twice earlier this pregnancy. She has been resistant to several antiemetics but was eventually controlled on zofran and thorazine. Patient reports her N/V returned 5 days ago, reporting inability to tolerate both food and liquids. She believes she has had a 15lb weight loss in the last 1.5 months. She states she has overall lost weight this pregnancy.  She denies any fever or chills, denies sick contacts, denies diarrhea, denies urinary complaints. She reports midline abdominal discomfort.   Patient denies CTXs, VB, LOF, reports good FM.      During examination, patient was noted to have irregular heartbeat. F/U EKG revealed  PVCs in a pattern of bigeminy.  Of note, there was concern for possible STEMI earlier in this pregnancy that was eventually ruled out.  Patient currently does report midline chest pain, is unsure if this is related to vomiting.  She states the chest pain started about 1 hour ago.  She states the pain does not radiate to her jaw, neck, arm, or back. She is not sweating. She states the pain feels like "somebody punched me.".         Hospital Course:  2018 Patient seen in [place] for N/V. Workup notable for irregular heart beat, found to have PVCs in pattern of bigeminy, likely 2/2 prolonged use of zofran and thorazine.  Patient admitted for continued evaluation and management. Plan to stop all antiemetics, trial methylprednisilone. Continuous telemetry, troponin " negative. Consult cardiology in the AM.   09/15/2018 Cardiology consulted, troponoins negative x 3, and repeat EKG with isolated vetricular beats but otherwise normal, maternal echocardiogram, EF 65-75%. Per cardiology recs, resume Pepcid as symptoms likely due to refulx.  Fetal NST reactive and reassuring.  Patient with continued N/V, on methylprednisolone.   09/16/2018 Patient doing well this AM. Tolerating regular diet without n/v. Steroids discontinued, will continue Pepcid as this relieves her symptoms. Patient complaining of contractions today, visible q4-5 minutes on tocometry. No change on SVE.   09/17/2018 Patient continues to complain of n/v. No further chest pain with pepcid. Will discuss anti-emetic medication use and safety with cardiology. Will obtain f/u of fetal growth today. D/w cardiology, pt ok to take Zofran/Thorazine.  09/18/2018 Patient reports tolerating broth last night. Thorazine has been helping somewhat. She had nausea with apple sauce last night. Has been eating minimally. Also reports a pain/pressure in her lower abdomen that started last night. Not relieved by urinating; she denies dysuria. 1cm dilated on SVE, same as previously. Discussed that she should try high calorie drinks if she is able to tolerate fluids better than solids by mouth. Discussed that due to IUGR otherwise uncomplicated, patient will need delivery at 37 weeks. Discussed that she will need prenatal testing twice weekly and weekly dopplers and BPP. Discharged with anti-emetics and pepcid. Patient to follow up in 1 week in clinic.    Interval Hx:  Patient reports tolerating broth last night. Thorazine has been helping somewhat. She had nausea with apple sauce last night. Has been eating minimally. Also reports a pain/pressure in her lower abdomen that started last night. Not relieved by urinating; she denies dysuria. Has not had BM since admission.   Denies chest pain, SOB, or labor symptoms. +FM    Obstetric History        T0      L0     SAB0   TAB0   Ectopic0   Multiple0   Live Births0       # Outcome Date GA Lbr Lan/2nd Weight Sex Delivery Anes PTL Lv   1 Current                 History reviewed. No pertinent past medical history.  History reviewed. No pertinent surgical history.    PTA Medications   Medication Sig    calcium-vitamin D 600 mg(1,500mg) -400 unit Tab Take 2 tablets by mouth once daily.    chlorproMAZINE (THORAZINE) 25 MG tablet Take 1 tablet (25 mg total) by mouth 3 (three) times daily. (Patient taking differently: Take 25 mg by mouth 3 (three) times daily. )    famotidine (PEPCID) 20 MG tablet Take 1 tablet (20 mg total) by mouth 2 (two) times daily.    ondansetron (ZOFRAN) 4 MG tablet Take 1 tablet (4 mg total) by mouth every 6 (six) hours as needed for Nausea.    prenatal vit,calc76/iron/folic (PNV 29-1 ORAL) Take 1 tablet by mouth once daily.       Review of patient's allergies indicates:   Allergen Reactions    Codeine         Family History     None        Tobacco Use    Smoking status: Former Smoker     Types: Cigarettes    Smokeless tobacco: Never Used   Substance and Sexual Activity    Alcohol use: No    Drug use: No    Sexual activity: Yes        Objective:     Vital Signs (Most Recent):  Temp: 97.1 °F (36.2 °C) (18 042)  Pulse: 82 (18 0600)  Resp: 18 (18)  BP: (!) 92/53 (18)  SpO2: 98 % (18) Vital Signs (24h Range):  Temp:  [97.1 °F (36.2 °C)-98.1 °F (36.7 °C)] 97.1 °F (36.2 °C)  Pulse:  [46-98] 82  Resp:  [16-18] 18  SpO2:  [96 %-100 %] 98 %  BP: ()/(53-71) 92/53     Weight: 70.5 kg (155 lb 8 oz)  Body mass index is 29.38 kg/m².    FHT: Cat 1 (reassuring) 135 bpm, Mod BTBV, + Acc, - Dec, Reactive/Reassuring   TOCO: No contractions currently, last visible ~  2am q4-5 min   SVE: 1/80/-2 on  @ 0700    Physical Exam:   Constitutional: She is oriented to person, place, and time. She appears well-developed.     Eyes: EOM are  normal.    Neck: Normal range of motion.    Cardiovascular: Normal rate, regular rhythm and normal heart sounds.     Pulmonary/Chest: Effort normal and breath sounds normal. No respiratory distress. She has no wheezes.        Abdominal: Soft. She exhibits no distension. There is no tenderness. There is no rebound and no guarding.             Musculoskeletal: Normal range of motion.       Neurological: She is alert and oriented to person, place, and time.    Skin: Skin is warm and dry.    Psychiatric: She has a normal mood and affect. Her behavior is normal. Judgment and thought content normal.       Cervix:  Deferred     Significant Labs:  Lab Results   Component Value Date    GROUPTRH A POS 2018    HEPBSAG Negative 2018       I have personallly reviewed all pertinent lab results from the last 24 hours.    Assessment/Plan:     28 y.o. female  at 35w2d for:    * Hyperemesis gravidarum with dehydration    - Continued N/V, resistant to multiple medications throughout pregnancy  - Patient has lost 6 pounds in last 1.5 months  - On admission, amylase/lipase, CBC, CMP wnl except for AST/ALT 47/64  - UA with 2+ Leuk/2+Blood/Trace Ketones/2+Protein/-Nitrites,Mod Trich. Unclear if related to UTI or trichomonas infection.    - UA/UCx cath with +1 leuks/ +1 ketones, urine culture NGTD   - vaginal flagyl not available to treat trichomonas - will treat with clindamycin as flagyl will worsen n/v  - Was on methylprednisolone 16mg q8 x3 days, which improved nausea. Discontinued today  - Patient started on famotidine BID as this improves symptoms  - Currently tolerating regular diet, though she reports difficulty eating full meal 2/2 dec appetite   - discussed with patient that she should try high calorie drinks on discharge  - Have not been using zofran/thorazine/phenergan/compazine due to cardiac side effects   - Zofran/ thorazine OK per cards        Ventricular bigeminy    - Irregular heartbeat noted on physical  exam; patient complained of chest pain relieved by bicitra.  - Conduction abnormality likely 2/2 prolonged use of antiemetics zofran and thorazine  - Troponin negative x 3 on admission  - EKG with isolated ventricular ectopic beats, and is otherwise within normal limits.  Followup electrocardiogram is also within normal limits.  - Have d/chiki zofran/thorazine, to avoid medications with arrhythmia side effects    - will discuss use of anti-emetics and safety with cardiology  - Continuous telemetry  - Per cardiology recs, may reinstate pepcid as symptoms of chest discomfort likely secondary to reflux        Pregnancy, supervision, high-risk    - Continue PNV  - T3 labs ordered   - RPR NR, HIV neg  - Consider GBS while inpatient  - Reporting lower abd pressure but no contractions.    - SVE 9/16 unchanged from previous one 1 week ago (1/60/-3).   - SVE 9/18: 1/80/-2   - Ctx > q10 min on monitor   - will put on monitor this AM to check for ctx   - No vaginal bleeding/LOF  - NST BID   - on continuous overnight 2/2 contractions  - Last growth 7/19 with  g 15% [place]   - Repeat growth yesterday: 2nd percentile. Will need delivery at 37 weeks with prenatal testing in the interim.        Trichomoniasis    - Seen on UA  - Patient treated earlier in pregnancy, unsure if her partner has been treated  - Held flagyl given nauseating SEs, will administer now that N/V better controlled   - will Rx with vaginal flagyl              Sushma K Reddy, MD  Obstetrics  [facility]

## 2018-09-18 NOTE — ASSESSMENT & PLAN NOTE
- Continue PNV  - T3 labs ordered   - RPR NR, HIV neg  - Consider GBS while inpatient  - Reporting lower abd pressure but no contractions.    - SVE 9/16 unchanged from previous one 1 week ago (1/60/-3).   - SVE 9/18: 1/80/-2   - Ctx > q10 min on monitor   - will put on monitor this AM to check for ctx   - No vaginal bleeding/LOF  - NST BID   - on continuous overnight 2/2 contractions  - Last growth 7/19 with  g 15% [place]   - Repeat growth yesterday: 2nd percentile. Will need delivery at 37 weeks with prenatal testing in the interim.

## 2018-09-19 DIAGNOSIS — O21.1 HYPEREMESIS GRAVIDARUM WITH DEHYDRATION: ICD-10-CM

## 2018-09-19 DIAGNOSIS — I49.9 IRREGULAR HEART BEAT: ICD-10-CM

## 2018-09-19 DIAGNOSIS — O09.93 SUPERVISION OF HIGH RISK PREGNANCY IN THIRD TRIMESTER: ICD-10-CM

## 2018-09-19 DIAGNOSIS — O36.5930 POOR FETAL GROWTH AFFECTING MANAGEMENT OF MOTHER IN THIRD TRIMESTER, SINGLE OR UNSPECIFIED FETUS: Primary | ICD-10-CM

## 2018-09-19 DIAGNOSIS — E43 SEVERE MALNUTRITION: ICD-10-CM

## 2018-09-19 NOTE — PLAN OF CARE
Problem: Patient Care Overview  Goal: Discharge Needs Assessment  Vital signs stable,telemetry discontinued prior to discharge; patient denies weakness, numbness, passing out; reports lessened feelings of nausea, dnies vomiting this shift, reports feeling an increas in appetite and tolerated liquids today; discharge instructions--signs of  labor, continued vomiting, cardiac awareness; instructed on follow up appointments; discharged

## 2018-09-20 LAB — BACTERIA SPEC AEROBE CULT: NORMAL

## 2018-09-21 ENCOUNTER — HOSPITAL ENCOUNTER (OUTPATIENT)
Dept: PERINATAL CARE | Facility: OTHER | Age: 28
Discharge: HOME | End: 2018-09-21
Attending: ADVANCED PRACTICE MIDWIFE
Payer: MEDICAID

## 2018-09-21 DIAGNOSIS — I49.9 IRREGULAR HEART BEAT: ICD-10-CM

## 2018-09-21 DIAGNOSIS — O09.93 SUPERVISION OF HIGH RISK PREGNANCY IN THIRD TRIMESTER: ICD-10-CM

## 2018-09-21 DIAGNOSIS — E43 SEVERE MALNUTRITION: ICD-10-CM

## 2018-09-21 DIAGNOSIS — O36.5930 POOR FETAL GROWTH AFFECTING MANAGEMENT OF MOTHER IN THIRD TRIMESTER, SINGLE OR UNSPECIFIED FETUS: ICD-10-CM

## 2018-09-21 PROCEDURE — 59025 FETAL NON-STRESS TEST: CPT | Mod: 26,,, | Performed by: OBSTETRICS & GYNECOLOGY

## 2018-09-21 PROCEDURE — 59025 FETAL NON-STRESS TEST: CPT

## 2018-09-25 ENCOUNTER — ROUTINE PRENATAL (OUTPATIENT)
Dept: OBSTETRICS AND GYNECOLOGY | Facility: CLINIC | Age: 28
End: 2018-09-25
Payer: MEDICAID

## 2018-09-25 VITALS — BODY MASS INDEX: 29.1 KG/M2 | DIASTOLIC BLOOD PRESSURE: 67 MMHG | SYSTOLIC BLOOD PRESSURE: 104 MMHG | WEIGHT: 154 LBS

## 2018-09-25 DIAGNOSIS — O09.93 SUPERVISION OF HIGH RISK PREGNANCY IN THIRD TRIMESTER: Primary | ICD-10-CM

## 2018-09-25 DIAGNOSIS — O21.1 HYPEREMESIS GRAVIDARUM WITH DEHYDRATION: ICD-10-CM

## 2018-09-25 PROCEDURE — 99999 PR PBB SHADOW E&M-EST. PATIENT-LVL II: CPT | Mod: PBBFAC,,, | Performed by: OBSTETRICS & GYNECOLOGY

## 2018-09-25 PROCEDURE — 99212 OFFICE O/P EST SF 10 MIN: CPT | Mod: PBBFAC,TH,PO,25 | Performed by: OBSTETRICS & GYNECOLOGY

## 2018-09-25 PROCEDURE — 99213 OFFICE O/P EST LOW 20 MIN: CPT | Mod: TH,S$PBB,, | Performed by: OBSTETRICS & GYNECOLOGY

## 2018-09-25 PROCEDURE — 90471 IMMUNIZATION ADMIN: CPT | Mod: PBBFAC,PO

## 2018-09-25 NOTE — PROGRESS NOTES
Complaints today: none  Good fm.  Denies ctx, vb, lof.  She attended session 5 centering today.  We discussed goals, comfort in labor, breathing, medications for birth, labor continuum, and when to call/come in.     /67   Wt 69.9 kg (154 lb)   LMP 2018   BMI 29.10 kg/m²     28 y.o., at 36w2d by Estimated Date of Delivery: 10/21/18  Patient Active Problem List   Diagnosis    Hyperemesis gravidarum with dehydration    Trichomoniasis    Severe malnutrition    Pregnancy, supervision, high-risk    Irregular heart beat    Ventricular bigeminy    PVCs (premature ventricular contractions)     OB History    Para Term  AB Living   1             SAB TAB Ectopic Multiple Live Births                  # Outcome Date GA Lbr Lan/2nd Weight Sex Delivery Anes PTL Lv   1 Current                   Dating reviewed    Allergies and problem list reviewed and updated    Medical and surgical history reviewed    Prenatal labs reviewed and updated    Physical Exam:  ABD: soft, gravid, nontender,     Assessment:  Diagnoses and all orders for this visit:    Supervision of high risk pregnancy in third trimester    Hyperemesis gravidarum with dehydration    Other orders  -     levonorgestrel (MIRENA) 20 mcg/24 hr (5 years) IUD; 1 Intra Uterine Device by Intrauterine route once. RETURN FOR INTRAUTERINE INSERTION IN THE OFFICE for 1 dose  -     Influenza - Quadrivalent (3 years & older) (PF)         Plan:   Induction  night   follow up 1 Weeks, bleeding/pain  kick counts, labor precautions

## 2018-09-26 ENCOUNTER — PROCEDURE VISIT (OUTPATIENT)
Dept: MATERNAL FETAL MEDICINE | Facility: CLINIC | Age: 28
End: 2018-09-26
Payer: MEDICAID

## 2018-09-26 ENCOUNTER — TELEPHONE (OUTPATIENT)
Dept: PHARMACY | Facility: CLINIC | Age: 28
End: 2018-09-26

## 2018-09-26 DIAGNOSIS — E43 SEVERE MALNUTRITION: ICD-10-CM

## 2018-09-26 DIAGNOSIS — O36.5930 POOR FETAL GROWTH AFFECTING MANAGEMENT OF MOTHER IN THIRD TRIMESTER, SINGLE OR UNSPECIFIED FETUS: ICD-10-CM

## 2018-09-26 DIAGNOSIS — I49.9 IRREGULAR HEART BEAT: ICD-10-CM

## 2018-09-26 DIAGNOSIS — O09.93 SUPERVISION OF HIGH RISK PREGNANCY IN THIRD TRIMESTER: ICD-10-CM

## 2018-09-26 PROCEDURE — 76819 FETAL BIOPHYS PROFIL W/O NST: CPT | Mod: PBBFAC | Performed by: OBSTETRICS & GYNECOLOGY

## 2018-09-26 PROCEDURE — 76820 UMBILICAL ARTERY ECHO: CPT | Mod: 26,S$PBB,, | Performed by: OBSTETRICS & GYNECOLOGY

## 2018-09-26 PROCEDURE — 99499 UNLISTED E&M SERVICE: CPT | Mod: S$PBB,,, | Performed by: OBSTETRICS & GYNECOLOGY

## 2018-09-26 PROCEDURE — 76819 FETAL BIOPHYS PROFIL W/O NST: CPT | Mod: 26,S$PBB,, | Performed by: OBSTETRICS & GYNECOLOGY

## 2018-09-26 PROCEDURE — 76820 UMBILICAL ARTERY ECHO: CPT | Mod: PBBFAC | Performed by: OBSTETRICS & GYNECOLOGY

## 2018-09-28 ENCOUNTER — PATIENT MESSAGE (OUTPATIENT)
Dept: OBSTETRICS AND GYNECOLOGY | Facility: CLINIC | Age: 28
End: 2018-09-28

## 2018-09-30 ENCOUNTER — HOSPITAL ENCOUNTER (INPATIENT)
Facility: OTHER | Age: 28
LOS: 5 days | Discharge: HOME | End: 2018-10-05
Attending: OBSTETRICS & GYNECOLOGY | Admitting: OBSTETRICS & GYNECOLOGY
Payer: MEDICAID

## 2018-09-30 ENCOUNTER — ANESTHESIA (OUTPATIENT)
Dept: OBSTETRICS AND GYNECOLOGY | Facility: OTHER | Age: 28
End: 2018-09-30
Payer: MEDICAID

## 2018-09-30 ENCOUNTER — ANESTHESIA EVENT (OUTPATIENT)
Dept: OBSTETRICS AND GYNECOLOGY | Facility: OTHER | Age: 28
End: 2018-09-30
Payer: MEDICAID

## 2018-09-30 DIAGNOSIS — Z34.90 ENCOUNTER FOR INDUCTION OF LABOR: ICD-10-CM

## 2018-09-30 LAB
ABO + RH BLD: NORMAL
BASOPHILS # BLD AUTO: 0.01 K/UL
BASOPHILS NFR BLD: 0.2 %
BLD GP AB SCN CELLS X3 SERPL QL: NORMAL
DIFFERENTIAL METHOD: ABNORMAL
EOSINOPHIL # BLD AUTO: 0 K/UL
EOSINOPHIL NFR BLD: 0.2 %
ERYTHROCYTE [DISTWIDTH] IN BLOOD BY AUTOMATED COUNT: 14.1 %
HCT VFR BLD AUTO: 36.6 %
HGB BLD-MCNC: 12.6 G/DL
LYMPHOCYTES # BLD AUTO: 2 K/UL
LYMPHOCYTES NFR BLD: 44.7 %
MCH RBC QN AUTO: 31.7 PG
MCHC RBC AUTO-ENTMCNC: 34.4 G/DL
MCV RBC AUTO: 92 FL
MONOCYTES # BLD AUTO: 0.5 K/UL
MONOCYTES NFR BLD: 11 %
NEUTROPHILS # BLD AUTO: 1.9 K/UL
NEUTROPHILS NFR BLD: 43.7 %
PLATELET # BLD AUTO: 177 K/UL
PMV BLD AUTO: 11.6 FL
RBC # BLD AUTO: 3.97 M/UL
WBC # BLD AUTO: 4.38 K/UL

## 2018-09-30 PROCEDURE — 85025 COMPLETE CBC W/AUTO DIFF WBC: CPT

## 2018-09-30 PROCEDURE — 86901 BLOOD TYPING SEROLOGIC RH(D): CPT

## 2018-09-30 PROCEDURE — 63600175 PHARM REV CODE 636 W HCPCS: Performed by: STUDENT IN AN ORGANIZED HEALTH CARE EDUCATION/TRAINING PROGRAM

## 2018-09-30 PROCEDURE — 27800517 HC TRAY,EPIDURAL-CONTINUOUS: Performed by: STUDENT IN AN ORGANIZED HEALTH CARE EDUCATION/TRAINING PROGRAM

## 2018-09-30 PROCEDURE — 11000001 HC ACUTE MED/SURG PRIVATE ROOM

## 2018-09-30 PROCEDURE — 25000003 PHARM REV CODE 250: Performed by: STUDENT IN AN ORGANIZED HEALTH CARE EDUCATION/TRAINING PROGRAM

## 2018-09-30 PROCEDURE — 27200710 HC EPIDURAL INFUSION PUMP SET: Performed by: STUDENT IN AN ORGANIZED HEALTH CARE EDUCATION/TRAINING PROGRAM

## 2018-09-30 RX ORDER — ONDANSETRON 8 MG/1
8 TABLET, ORALLY DISINTEGRATING ORAL EVERY 8 HOURS PRN
Status: DISCONTINUED | OUTPATIENT
Start: 2018-09-30 | End: 2018-10-01

## 2018-09-30 RX ORDER — OXYTOCIN/RINGER'S LACTATE 20/1000 ML
20 PLASTIC BAG, INJECTION (ML) INTRAVENOUS ONCE
Status: DISCONTINUED | OUTPATIENT
Start: 2018-09-30 | End: 2018-10-01

## 2018-09-30 RX ORDER — METHYLERGONOVINE MALEATE 0.2 MG/ML
200 INJECTION INTRAVENOUS
Status: DISCONTINUED | OUTPATIENT
Start: 2018-09-30 | End: 2018-10-01

## 2018-09-30 RX ORDER — ONDANSETRON 8 MG/1
8 TABLET, ORALLY DISINTEGRATING ORAL EVERY 8 HOURS PRN
Status: DISCONTINUED | OUTPATIENT
Start: 2018-09-30 | End: 2018-09-30 | Stop reason: SDUPTHER

## 2018-09-30 RX ORDER — CARBOPROST TROMETHAMINE 250 UG/ML
250 INJECTION, SOLUTION INTRAMUSCULAR
Status: DISCONTINUED | OUTPATIENT
Start: 2018-09-30 | End: 2018-09-30 | Stop reason: SDUPTHER

## 2018-09-30 RX ORDER — OXYTOCIN/RINGER'S LACTATE 20/1000 ML
333 PLASTIC BAG, INJECTION (ML) INTRAVENOUS CONTINUOUS
Status: DISCONTINUED | OUTPATIENT
Start: 2018-09-30 | End: 2018-09-30 | Stop reason: SDUPTHER

## 2018-09-30 RX ORDER — OXYTOCIN/RINGER'S LACTATE 20/1000 ML
2 PLASTIC BAG, INJECTION (ML) INTRAVENOUS CONTINUOUS
Status: DISCONTINUED | OUTPATIENT
Start: 2018-09-30 | End: 2018-10-01

## 2018-09-30 RX ORDER — SODIUM CHLORIDE, SODIUM LACTATE, POTASSIUM CHLORIDE, CALCIUM CHLORIDE 600; 310; 30; 20 MG/100ML; MG/100ML; MG/100ML; MG/100ML
INJECTION, SOLUTION INTRAVENOUS CONTINUOUS
Status: DISCONTINUED | OUTPATIENT
Start: 2018-09-30 | End: 2018-09-30 | Stop reason: SDUPTHER

## 2018-09-30 RX ORDER — CEFAZOLIN SODIUM 2 G/50ML
2 SOLUTION INTRAVENOUS
Status: DISCONTINUED | OUTPATIENT
Start: 2018-09-30 | End: 2018-10-01

## 2018-09-30 RX ORDER — FENTANYL/BUPIVACAINE/NS/PF 2MCG/ML-.1
PLASTIC BAG, INJECTION (ML) INJECTION
Status: DISPENSED
Start: 2018-09-30 | End: 2018-10-01

## 2018-09-30 RX ORDER — OXYTOCIN/RINGER'S LACTATE 20/1000 ML
333 PLASTIC BAG, INJECTION (ML) INTRAVENOUS CONTINUOUS
Status: ACTIVE | OUTPATIENT
Start: 2018-09-30 | End: 2018-09-30

## 2018-09-30 RX ORDER — OXYTOCIN/RINGER'S LACTATE 20/1000 ML
20 PLASTIC BAG, INJECTION (ML) INTRAVENOUS ONCE
Status: DISCONTINUED | OUTPATIENT
Start: 2018-09-30 | End: 2018-09-30 | Stop reason: SDUPTHER

## 2018-09-30 RX ORDER — METHYLERGONOVINE MALEATE 0.2 MG/ML
200 INJECTION INTRAVENOUS
Status: DISCONTINUED | OUTPATIENT
Start: 2018-09-30 | End: 2018-09-30 | Stop reason: SDUPTHER

## 2018-09-30 RX ORDER — MISOPROSTOL 200 UG/1
600 TABLET ORAL
Status: DISCONTINUED | OUTPATIENT
Start: 2018-09-30 | End: 2018-10-01

## 2018-09-30 RX ORDER — MISOPROSTOL 200 UG/1
600 TABLET ORAL
Status: DISCONTINUED | OUTPATIENT
Start: 2018-09-30 | End: 2018-09-30 | Stop reason: SDUPTHER

## 2018-09-30 RX ORDER — SODIUM CHLORIDE, SODIUM LACTATE, POTASSIUM CHLORIDE, CALCIUM CHLORIDE 600; 310; 30; 20 MG/100ML; MG/100ML; MG/100ML; MG/100ML
INJECTION, SOLUTION INTRAVENOUS CONTINUOUS
Status: DISCONTINUED | OUTPATIENT
Start: 2018-09-30 | End: 2018-10-01

## 2018-09-30 RX ORDER — OXYTOCIN/RINGER'S LACTATE 20/1000 ML
41.7 PLASTIC BAG, INJECTION (ML) INTRAVENOUS CONTINUOUS
Status: DISCONTINUED | OUTPATIENT
Start: 2018-09-30 | End: 2018-09-30 | Stop reason: SDUPTHER

## 2018-09-30 RX ORDER — TERBUTALINE SULFATE 1 MG/ML
0.25 INJECTION SUBCUTANEOUS
Status: DISCONTINUED | OUTPATIENT
Start: 2018-09-30 | End: 2018-10-01

## 2018-09-30 RX ORDER — OXYTOCIN/RINGER'S LACTATE 20/1000 ML
20 PLASTIC BAG, INJECTION (ML) INTRAVENOUS
Status: DISCONTINUED | OUTPATIENT
Start: 2018-09-30 | End: 2018-10-01

## 2018-09-30 RX ORDER — SODIUM CHLORIDE 9 MG/ML
INJECTION, SOLUTION INTRAVENOUS
Status: DISCONTINUED | OUTPATIENT
Start: 2018-09-30 | End: 2018-10-01

## 2018-09-30 RX ORDER — OXYTOCIN/RINGER'S LACTATE 20/1000 ML
41.65 PLASTIC BAG, INJECTION (ML) INTRAVENOUS CONTINUOUS
Status: DISCONTINUED | OUTPATIENT
Start: 2018-09-30 | End: 2018-09-30 | Stop reason: SDUPTHER

## 2018-09-30 RX ORDER — CARBOPROST TROMETHAMINE 250 UG/ML
250 INJECTION, SOLUTION INTRAMUSCULAR
Status: DISCONTINUED | OUTPATIENT
Start: 2018-09-30 | End: 2018-10-01

## 2018-09-30 RX ORDER — SODIUM CHLORIDE 9 MG/ML
INJECTION, SOLUTION INTRAVENOUS
Status: DISCONTINUED | OUTPATIENT
Start: 2018-09-30 | End: 2018-09-30 | Stop reason: SDUPTHER

## 2018-09-30 RX ORDER — OXYTOCIN/RINGER'S LACTATE 20/1000 ML
41.7 PLASTIC BAG, INJECTION (ML) INTRAVENOUS CONTINUOUS
Status: ACTIVE | OUTPATIENT
Start: 2018-09-30 | End: 2018-10-01

## 2018-09-30 RX ORDER — SODIUM CHLORIDE, SODIUM LACTATE, POTASSIUM CHLORIDE, CALCIUM CHLORIDE 600; 310; 30; 20 MG/100ML; MG/100ML; MG/100ML; MG/100ML
INJECTION, SOLUTION INTRAVENOUS CONTINUOUS
Status: DISCONTINUED | OUTPATIENT
Start: 2018-09-30 | End: 2018-10-05 | Stop reason: HOSPADM

## 2018-09-30 RX ORDER — TERBUTALINE SULFATE 1 MG/ML
0.25 INJECTION SUBCUTANEOUS
Status: DISCONTINUED | OUTPATIENT
Start: 2018-09-30 | End: 2018-09-30 | Stop reason: SDUPTHER

## 2018-09-30 RX ADMIN — Medication 10 ML/HR: at 11:09

## 2018-09-30 RX ADMIN — LIDOCAINE HYDROCHLORIDE,EPINEPHRINE BITARTRATE 3 ML: 15; .005 INJECTION, SOLUTION EPIDURAL; INFILTRATION; INTRACAUDAL; PERINEURAL at 11:09

## 2018-09-30 RX ADMIN — SODIUM CHLORIDE, SODIUM LACTATE, POTASSIUM CHLORIDE, AND CALCIUM CHLORIDE: .6; .31; .03; .02 INJECTION, SOLUTION INTRAVENOUS at 09:09

## 2018-09-30 RX ADMIN — Medication 2 MILLI-UNITS/MIN: at 09:09

## 2018-10-01 LAB
ALBUMIN SERPL BCP-MCNC: 2.9 G/DL
ALLENS TEST: ABNORMAL
ALLENS TEST: ABNORMAL
ALP SERPL-CCNC: 141 U/L
ALT SERPL W/O P-5'-P-CCNC: 15 U/L
ANION GAP SERPL CALC-SCNC: 13 MMOL/L
AST SERPL-CCNC: 18 U/L
BILIRUB SERPL-MCNC: 0.6 MG/DL
BUN SERPL-MCNC: 9 MG/DL
CALCIUM SERPL-MCNC: 10 MG/DL
CHLORIDE SERPL-SCNC: 106 MMOL/L
CO2 SERPL-SCNC: 18 MMOL/L
CREAT SERPL-MCNC: 0.7 MG/DL
CREAT UR-MCNC: 42.9 MG/DL
DELSYS: ABNORMAL
DELSYS: ABNORMAL
ERYTHROCYTE [SEDIMENTATION RATE] IN BLOOD BY WESTERGREN METHOD: 0 MM/H
ERYTHROCYTE [SEDIMENTATION RATE] IN BLOOD BY WESTERGREN METHOD: 0 MM/H
EST. GFR  (AFRICAN AMERICAN): >60 ML/MIN/1.73 M^2
EST. GFR  (NON AFRICAN AMERICAN): >60 ML/MIN/1.73 M^2
FIO2: 0
FIO2: 0
FLOW: 0
FLOW: 0
GLUCOSE SERPL-MCNC: 57 MG/DL
HCO3 UR-SCNC: 19.5 MMOL/L (ref 24–28)
HCO3 UR-SCNC: 19.6 MMOL/L (ref 24–28)
MODE: ABNORMAL
MODE: ABNORMAL
PCO2 BLDA: 35.8 MMHG (ref 35–45)
PCO2 BLDA: 39.9 MMHG (ref 35–45)
PH SMN: 7.3 [PH] (ref 7.35–7.45)
PH SMN: 7.34 [PH] (ref 7.35–7.45)
PO2 BLDA: 16 MMHG (ref 80–100)
PO2 BLDA: 33 MMHG (ref 80–100)
POC BE: -6 MMOL/L
POC BE: -7 MMOL/L
POC SATURATED O2: 21 % (ref 95–100)
POC SATURATED O2: 57 % (ref 95–100)
POTASSIUM SERPL-SCNC: 4.2 MMOL/L
PROT SERPL-MCNC: 7 G/DL
PROT UR-MCNC: <7 MG/DL
PROT/CREAT UR: NORMAL MG/G{CREAT}
SAMPLE: ABNORMAL
SAMPLE: ABNORMAL
SITE: ABNORMAL
SITE: ABNORMAL
SODIUM SERPL-SCNC: 137 MMOL/L
SP02: 0
SP02: 0

## 2018-10-01 PROCEDURE — 25000003 PHARM REV CODE 250: Performed by: STUDENT IN AN ORGANIZED HEALTH CARE EDUCATION/TRAINING PROGRAM

## 2018-10-01 PROCEDURE — 37000008 HC ANESTHESIA 1ST 15 MINUTES: Performed by: OBSTETRICS & GYNECOLOGY

## 2018-10-01 PROCEDURE — 63600175 PHARM REV CODE 636 W HCPCS: Performed by: STUDENT IN AN ORGANIZED HEALTH CARE EDUCATION/TRAINING PROGRAM

## 2018-10-01 PROCEDURE — 37000009 HC ANESTHESIA EA ADD 15 MINS: Performed by: OBSTETRICS & GYNECOLOGY

## 2018-10-01 PROCEDURE — 84156 ASSAY OF PROTEIN URINE: CPT

## 2018-10-01 PROCEDURE — 51702 INSERT TEMP BLADDER CATH: CPT

## 2018-10-01 PROCEDURE — 80053 COMPREHEN METABOLIC PANEL: CPT

## 2018-10-01 PROCEDURE — 72100003 HC LABOR CARE, EA. ADDL. 8 HRS

## 2018-10-01 PROCEDURE — 11000001 HC ACUTE MED/SURG PRIVATE ROOM

## 2018-10-01 PROCEDURE — 59514 CESAREAN DELIVERY ONLY: CPT | Mod: AT,,, | Performed by: OBSTETRICS & GYNECOLOGY

## 2018-10-01 PROCEDURE — 82803 BLOOD GASES ANY COMBINATION: CPT

## 2018-10-01 PROCEDURE — 99900035 HC TECH TIME PER 15 MIN (STAT)

## 2018-10-01 PROCEDURE — 36000684 HC CESAREAN SECTION, UNSCHEDULED

## 2018-10-01 PROCEDURE — 72100002 HC LABOR CARE, 1ST 8 HOURS

## 2018-10-01 PROCEDURE — 63600175 PHARM REV CODE 636 W HCPCS: Performed by: ANESTHESIOLOGY

## 2018-10-01 PROCEDURE — 88307 TISSUE EXAM BY PATHOLOGIST: CPT | Mod: 26,,, | Performed by: PATHOLOGY

## 2018-10-01 PROCEDURE — 62326 NJX INTERLAMINAR LMBR/SAC: CPT | Performed by: STUDENT IN AN ORGANIZED HEALTH CARE EDUCATION/TRAINING PROGRAM

## 2018-10-01 PROCEDURE — 36415 COLL VENOUS BLD VENIPUNCTURE: CPT

## 2018-10-01 PROCEDURE — 25000003 PHARM REV CODE 250: Performed by: ANESTHESIOLOGY

## 2018-10-01 PROCEDURE — 88307 TISSUE EXAM BY PATHOLOGIST: CPT | Performed by: PATHOLOGY

## 2018-10-01 RX ORDER — OXYTOCIN/RINGER'S LACTATE 20/1000 ML
20 PLASTIC BAG, INJECTION (ML) INTRAVENOUS ONCE
Status: DISCONTINUED | OUTPATIENT
Start: 2018-10-01 | End: 2018-10-05 | Stop reason: HOSPADM

## 2018-10-01 RX ORDER — SODIUM CITRATE AND CITRIC ACID MONOHYDRATE 334; 500 MG/5ML; MG/5ML
30 SOLUTION ORAL ONCE
Status: DISCONTINUED | OUTPATIENT
Start: 2018-10-01 | End: 2018-10-01

## 2018-10-01 RX ORDER — ACETAMINOPHEN 325 MG/1
650 TABLET ORAL EVERY 6 HOURS
Status: COMPLETED | OUTPATIENT
Start: 2018-10-01 | End: 2018-10-02

## 2018-10-01 RX ORDER — DOCUSATE SODIUM 100 MG/1
200 CAPSULE, LIQUID FILLED ORAL 2 TIMES DAILY
Qty: 40 CAPSULE | Refills: 0 | COMMUNITY
Start: 2018-10-01 | End: 2019-01-16

## 2018-10-01 RX ORDER — KETOROLAC TROMETHAMINE 30 MG/ML
30 INJECTION, SOLUTION INTRAMUSCULAR; INTRAVENOUS EVERY 6 HOURS
Status: COMPLETED | OUTPATIENT
Start: 2018-10-01 | End: 2018-10-02

## 2018-10-01 RX ORDER — ONDANSETRON 8 MG/1
8 TABLET, ORALLY DISINTEGRATING ORAL EVERY 8 HOURS PRN
Status: DISCONTINUED | OUTPATIENT
Start: 2018-10-01 | End: 2018-10-01

## 2018-10-01 RX ORDER — AMOXICILLIN 250 MG
1 CAPSULE ORAL NIGHTLY PRN
Status: DISCONTINUED | OUTPATIENT
Start: 2018-10-01 | End: 2018-10-05 | Stop reason: HOSPADM

## 2018-10-01 RX ORDER — METHOCARBAMOL 500 MG/1
500 TABLET, FILM COATED ORAL 4 TIMES DAILY
Status: DISCONTINUED | OUTPATIENT
Start: 2018-10-01 | End: 2018-10-01

## 2018-10-01 RX ORDER — OXYTOCIN/RINGER'S LACTATE 20/1000 ML
41.65 PLASTIC BAG, INJECTION (ML) INTRAVENOUS CONTINUOUS
Status: ACTIVE | OUTPATIENT
Start: 2018-10-01 | End: 2018-10-01

## 2018-10-01 RX ORDER — FENTANYL CITRATE 50 UG/ML
INJECTION, SOLUTION INTRAMUSCULAR; INTRAVENOUS
Status: DISCONTINUED | OUTPATIENT
Start: 2018-10-01 | End: 2018-10-01

## 2018-10-01 RX ORDER — FENTANYL/BUPIVACAINE/NS/PF 2MCG/ML-.1
PLASTIC BAG, INJECTION (ML) INJECTION CONTINUOUS
Status: DISCONTINUED | OUTPATIENT
Start: 2018-10-01 | End: 2018-10-05 | Stop reason: HOSPADM

## 2018-10-01 RX ORDER — ONDANSETRON HYDROCHLORIDE 2 MG/ML
INJECTION, SOLUTION INTRAMUSCULAR; INTRAVENOUS
Status: DISCONTINUED | OUTPATIENT
Start: 2018-10-01 | End: 2018-10-01

## 2018-10-01 RX ORDER — SIMETHICONE 80 MG
1 TABLET,CHEWABLE ORAL EVERY 6 HOURS PRN
Status: DISCONTINUED | OUTPATIENT
Start: 2018-10-01 | End: 2018-10-05 | Stop reason: HOSPADM

## 2018-10-01 RX ORDER — IBUPROFEN 600 MG/1
600 TABLET ORAL EVERY 6 HOURS
Status: DISCONTINUED | OUTPATIENT
Start: 2018-10-01 | End: 2018-10-01

## 2018-10-01 RX ORDER — DIPHENHYDRAMINE HCL 25 MG
25 CAPSULE ORAL EVERY 4 HOURS PRN
Status: DISCONTINUED | OUTPATIENT
Start: 2018-10-01 | End: 2018-10-05 | Stop reason: HOSPADM

## 2018-10-01 RX ORDER — LIDOCAINE HYDROCHLORIDE 20 MG/ML
INJECTION, SOLUTION EPIDURAL; INFILTRATION; INTRACAUDAL; PERINEURAL
Status: DISCONTINUED | OUTPATIENT
Start: 2018-10-01 | End: 2018-10-01

## 2018-10-01 RX ORDER — ONDANSETRON 2 MG/ML
4 INJECTION INTRAMUSCULAR; INTRAVENOUS EVERY 6 HOURS PRN
Status: ACTIVE | OUTPATIENT
Start: 2018-10-01 | End: 2018-10-02

## 2018-10-01 RX ORDER — METHOCARBAMOL 500 MG/1
500 TABLET, FILM COATED ORAL EVERY 6 HOURS
Status: COMPLETED | OUTPATIENT
Start: 2018-10-02 | End: 2018-10-02

## 2018-10-01 RX ORDER — DOCUSATE SODIUM 100 MG/1
200 CAPSULE, LIQUID FILLED ORAL 2 TIMES DAILY
Status: DISCONTINUED | OUTPATIENT
Start: 2018-10-01 | End: 2018-10-05 | Stop reason: HOSPADM

## 2018-10-01 RX ORDER — MUPIROCIN 20 MG/G
1 OINTMENT TOPICAL 2 TIMES DAILY
Status: DISCONTINUED | OUTPATIENT
Start: 2018-10-01 | End: 2018-10-05 | Stop reason: HOSPADM

## 2018-10-01 RX ORDER — BISACODYL 10 MG
10 SUPPOSITORY, RECTAL RECTAL ONCE AS NEEDED
Status: ACTIVE | OUTPATIENT
Start: 2018-10-01 | End: 2018-10-01

## 2018-10-01 RX ORDER — OXYTOCIN 10 [USP'U]/ML
INJECTION, SOLUTION INTRAMUSCULAR; INTRAVENOUS
Status: DISCONTINUED | OUTPATIENT
Start: 2018-10-01 | End: 2018-10-01

## 2018-10-01 RX ORDER — PHENYLEPHRINE HYDROCHLORIDE 10 MG/ML
INJECTION INTRAVENOUS
Status: DISCONTINUED | OUTPATIENT
Start: 2018-10-01 | End: 2018-10-01

## 2018-10-01 RX ORDER — HYDROCORTISONE 25 MG/G
CREAM TOPICAL 3 TIMES DAILY PRN
Status: DISCONTINUED | OUTPATIENT
Start: 2018-10-01 | End: 2018-10-05 | Stop reason: HOSPADM

## 2018-10-01 RX ORDER — FAMOTIDINE 10 MG/ML
20 INJECTION INTRAVENOUS ONCE
Status: DISCONTINUED | OUTPATIENT
Start: 2018-10-01 | End: 2018-10-01

## 2018-10-01 RX ORDER — KETOROLAC TROMETHAMINE 30 MG/ML
INJECTION, SOLUTION INTRAMUSCULAR; INTRAVENOUS
Status: DISCONTINUED | OUTPATIENT
Start: 2018-10-01 | End: 2018-10-01

## 2018-10-01 RX ORDER — SODIUM CHLORIDE, SODIUM LACTATE, POTASSIUM CHLORIDE, CALCIUM CHLORIDE 600; 310; 30; 20 MG/100ML; MG/100ML; MG/100ML; MG/100ML
INJECTION, SOLUTION INTRAVENOUS CONTINUOUS
Status: ACTIVE | OUTPATIENT
Start: 2018-10-01 | End: 2018-10-01

## 2018-10-01 RX ORDER — ADHESIVE BANDAGE
30 BANDAGE TOPICAL 2 TIMES DAILY PRN
Status: DISCONTINUED | OUTPATIENT
Start: 2018-10-02 | End: 2018-10-05 | Stop reason: HOSPADM

## 2018-10-01 RX ORDER — ONDANSETRON 2 MG/ML
4 INJECTION INTRAMUSCULAR; INTRAVENOUS EVERY 6 HOURS PRN
Status: DISCONTINUED | OUTPATIENT
Start: 2018-10-01 | End: 2018-10-01

## 2018-10-01 RX ORDER — ONDANSETRON 8 MG/1
8 TABLET, ORALLY DISINTEGRATING ORAL EVERY 8 HOURS PRN
Status: DISCONTINUED | OUTPATIENT
Start: 2018-10-02 | End: 2018-10-05 | Stop reason: HOSPADM

## 2018-10-01 RX ORDER — ROPIVACAINE HYDROCHLORIDE 2 MG/ML
INJECTION, SOLUTION EPIDURAL; INFILTRATION; PERINEURAL
Status: DISCONTINUED | OUTPATIENT
Start: 2018-10-01 | End: 2018-10-01

## 2018-10-01 RX ORDER — KETOROLAC TROMETHAMINE 30 MG/ML
30 INJECTION, SOLUTION INTRAMUSCULAR; INTRAVENOUS EVERY 6 HOURS
Status: DISCONTINUED | OUTPATIENT
Start: 2018-10-01 | End: 2018-10-01 | Stop reason: SDUPTHER

## 2018-10-01 RX ORDER — ACETAMINOPHEN 10 MG/ML
INJECTION, SOLUTION INTRAVENOUS
Status: DISCONTINUED | OUTPATIENT
Start: 2018-10-01 | End: 2018-10-01

## 2018-10-01 RX ORDER — ACETAMINOPHEN 325 MG/1
650 TABLET ORAL EVERY 6 HOURS
Status: DISCONTINUED | OUTPATIENT
Start: 2018-10-01 | End: 2018-10-01

## 2018-10-01 RX ORDER — MEPERIDINE HYDROCHLORIDE 50 MG/1
50 TABLET ORAL EVERY 6 HOURS PRN
Status: DISCONTINUED | OUTPATIENT
Start: 2018-10-01 | End: 2018-10-02

## 2018-10-01 RX ORDER — FENTANYL/BUPIVACAINE/NS/PF 2MCG/ML-.1
PLASTIC BAG, INJECTION (ML) INJECTION CONTINUOUS PRN
Status: DISCONTINUED | OUTPATIENT
Start: 2018-10-01 | End: 2018-10-01

## 2018-10-01 RX ORDER — IBUPROFEN 600 MG/1
600 TABLET ORAL EVERY 6 HOURS
Qty: 30 TABLET | Refills: 1 | Status: SHIPPED | OUTPATIENT
Start: 2018-10-01 | End: 2019-01-16

## 2018-10-01 RX ORDER — LIDOCAINE HYDROCHLORIDE AND EPINEPHRINE 15; 5 MG/ML; UG/ML
INJECTION, SOLUTION EPIDURAL
Status: DISCONTINUED | OUTPATIENT
Start: 2018-09-30 | End: 2018-10-01

## 2018-10-01 RX ADMIN — METHOCARBAMOL 500 MG: 500 TABLET ORAL at 06:10

## 2018-10-01 RX ADMIN — Medication 10 ML/HR: at 12:10

## 2018-10-01 RX ADMIN — KETOROLAC TROMETHAMINE 30 MG: 30 INJECTION, SOLUTION INTRAMUSCULAR; INTRAVENOUS at 04:10

## 2018-10-01 RX ADMIN — PHENYLEPHRINE HYDROCHLORIDE 100 MCG: 10 INJECTION INTRAVENOUS at 03:10

## 2018-10-01 RX ADMIN — PHENYLEPHRINE HYDROCHLORIDE 100 MCG: 10 INJECTION INTRAVENOUS at 04:10

## 2018-10-01 RX ADMIN — OXYTOCIN 5 UNITS: 10 INJECTION, SOLUTION INTRAMUSCULAR; INTRAVENOUS at 04:10

## 2018-10-01 RX ADMIN — KETOROLAC TROMETHAMINE 30 MG: 30 INJECTION, SOLUTION INTRAMUSCULAR at 11:10

## 2018-10-01 RX ADMIN — ACETAMINOPHEN 650 MG: 325 TABLET ORAL at 11:10

## 2018-10-01 RX ADMIN — ACETAMINOPHEN 650 MG: 325 TABLET ORAL at 05:10

## 2018-10-01 RX ADMIN — TERBUTALINE SULFATE 0.25 MG: 1 INJECTION, SOLUTION SUBCUTANEOUS at 03:10

## 2018-10-01 RX ADMIN — KETOROLAC TROMETHAMINE 30 MG: 30 INJECTION, SOLUTION INTRAMUSCULAR at 05:10

## 2018-10-01 RX ADMIN — PHENYLEPHRINE HYDROCHLORIDE 200 MCG: 10 INJECTION INTRAVENOUS at 04:10

## 2018-10-01 RX ADMIN — PHENYLEPHRINE HYDROCHLORIDE 200 MCG: 10 INJECTION INTRAVENOUS at 03:10

## 2018-10-01 RX ADMIN — ACETAMINOPHEN 1000 MG: 10 INJECTION, SOLUTION INTRAVENOUS at 04:10

## 2018-10-01 RX ADMIN — FENTANYL CITRATE 100 MCG: 50 INJECTION, SOLUTION INTRAMUSCULAR; INTRAVENOUS at 03:10

## 2018-10-01 RX ADMIN — ONDANSETRON 4 MG: 2 INJECTION, SOLUTION INTRAMUSCULAR; INTRAVENOUS at 03:10

## 2018-10-01 RX ADMIN — LIDOCAINE HYDROCHLORIDE 5 ML: 20 INJECTION, SOLUTION EPIDURAL; INFILTRATION; INTRACAUDAL; PERINEURAL at 04:10

## 2018-10-01 RX ADMIN — AZITHROMYCIN MONOHYDRATE 500 MG: 500 INJECTION, POWDER, LYOPHILIZED, FOR SOLUTION INTRAVENOUS at 03:10

## 2018-10-01 RX ADMIN — ROPIVACAINE HYDROCHLORIDE 40 ML: 2 INJECTION, SOLUTION EPIDURAL; INFILTRATION at 05:10

## 2018-10-01 RX ADMIN — DOCUSATE SODIUM 200 MG: 100 CAPSULE, LIQUID FILLED ORAL at 11:10

## 2018-10-01 RX ADMIN — DEXTROSE 2 G: 50 INJECTION, SOLUTION INTRAVENOUS at 03:10

## 2018-10-01 NOTE — LACTATION NOTE
10/01/18 1215   Maternal Infant Assessment   Breast Shape Bilateral:;round   Breast Density Bilateral:;soft   Areola Bilateral:;elastic   Nipple(s) Bilateral:;graspable   Maternal Infant Feeding   Maternal Emotional State assist needed;relaxed   Time Spent (min) 15-30 min   Equipment Type/Education   Pump Type Symphony   Breast Pump Type double electric, hospital grade   Breast Pump Flange Type hard   Breast Pump Flange Size 24 mm   Breast Pumping Bilateral Breasts:;pumped until emptied   Pumping Frequency (times) (1st pumping session)   Lactation Interventions   Attachment Promotion counseling provided;privacy provided;rooming-in promoted;skin-to-skin contact encouraged;family involvement promoted   Breastfeeding Assistance feeding cue recognition promoted;feeding on demand promoted;milk expression/pumping;support offered   Maternal Breastfeeding Support encouragement offered;maternal hydration promoted;maternal nutrition promoted;maternal rest encouraged

## 2018-10-01 NOTE — ANESTHESIA PROCEDURE NOTES
Peripheral    Patient location during procedure: OR   Block not for primary anesthetic.  Reason for block: at surgeon's request and post-op pain management   Post-op Pain Location: abdomen, bilateral  Start time: 10/1/2018 5:06 AM  Timeout: 10/1/2018 5:05 AM   End time: 10/1/2018 5:10 AM  Staffing  Anesthesiologist: Maria Teresa Meneses MD  Resident/CRNA: Samuel Katz MD  Performed: resident/CRNA   Preanesthetic Checklist  Completed: patient identified, surgical consent, pre-op evaluation, timeout performed, IV checked, risks and benefits discussed and monitors and equipment checked  Peripheral Block  Patient position: supine  Prep: ChloraPrep  Patient monitoring: heart rate, cardiac monitor, continuous pulse ox and frequent blood pressure checks  Block type: TAP  Laterality: bilateral  Injection technique: single shot  Needle  Needle type: Stimuplex   Needle gauge: 22 G  Needle length: 2 in  Needle localization: ultrasound guidance  Needle insertion depth: 2 cm     Assessment  Injection assessment: negative aspiration, negative parasthesia and local visualized surrounding nerve  Paresthesia pain: none  Heart rate change: no  Slow fractionated injection: yes

## 2018-10-01 NOTE — ANESTHESIA PROCEDURE NOTES
Epidural    Patient location during procedure: OB   Reason for block: primary anesthetic   Diagnosis: iup   Start time: 9/30/2018 11:45 PM  Timeout: 9/30/2018 11:45 PM  End time: 10/1/2018 12:05 AM  Staffing  Anesthesiologist: Maria Teresa Meneses MD  Resident/CRNA: Samuel Katz MD  Performed: resident/CRNA   Preanesthetic Checklist  Completed: patient identified, site marked, surgical consent, pre-op evaluation, timeout performed, IV checked, risks and benefits discussed, monitors and equipment checked, anesthesia consent given, hand hygiene performed and patient being monitored  Preparation  Patient position: sitting  Prep: ChloraPrep  Patient monitoring: Pulse Ox and Blood Pressure  Epidural  Skin Anesthetic: lidocaine 1%  Skin Wheal: 3 mL  Administration type: continuous  Approach: midline  Interspace: L4-5  Injection technique: CITLALY saline  Needle and Epidural Catheter  Needle type: Tuohy   Needle gauge: 17  Needle length: 5.0 inches  Needle insertion depth: 5.5 cm  Catheter type: springwound  Catheter size: 19 G  Catheter at skin depth: 9 cm  Test dose: 3 mL of lidocaine 1.5% with Epi 1-to-200,000  Additional Documentation: incremental injection, negative aspiration for heme and CSF, no paresthesia on injection, no signs/symptoms of IV or SA injection, no significant complaints from patient and no significant pain on injection  Needle localization: anatomical landmarks  Assessment  Ease of block: easy  Patient's tolerance of the procedure: comfortable throughout block and no complaints

## 2018-10-01 NOTE — PROGRESS NOTES
LABOR PROGRESS NOTE    S:  MD to bedside for cervical check. Epidural in place, working    O: Temp:  [96.6 °F (35.9 °C)-96.8 °F (36 °C)] 96.8 °F (36 °C)  Pulse:  [67-92] 92  Resp:  [16] 16  SpO2:  [99 %-100 %] 100 %  BP: (110-149)/() 128/89      FHT: 120BPM/Mod beat to beat variability/+accels/recent variable decel with quick return to baseline, overall reassuring  CTX: q 2-4 minutes  SVE: /-3  SROM, clear 0215      ASSESSMENT:   28 y.o.  at 37w1d    FHT reassuring    Active Hospital Problems    Diagnosis  POA    Indication for care in labor or delivery [O75.9]  Yes    Encounter for induction of labor [Z34.90]  Not Applicable      Resolved Hospital Problems   No resolved problems to display.     2130: 80/-3  2200: pitocin started  0030: 3/80/-3, pit at 2 (check delayed for epidural placement)   0215: /-3, pit at 2, SROM clear     PLAN:    Labor management  Continue Close Maternal/Fetal Monitoring.   Pitocin Augmentation per protocol  Recheck 2 hours or PRN    Persistent mild range BPs  - asx  - will sent PreE labs and f/u       Jessica Herrera MD

## 2018-10-01 NOTE — PROGRESS NOTES
"Called by nursing about patient's abdominal pain.  Patient has an allergy to codeine which patient describes as "body shutting down" and denies having tried any other opioid.  Patient received a TAP block early morning which is likely starting to wear off.  Patient is currently on scheduled acetaminophen and ketorolac.  Patient is on her phone but states pain is 10/10, is above her incision and feels like sharp spastic pain that is constant.  Discussed with staff Dr. Maki and patient, will start robaxin scheduled and PRN meperidine 50mg PO q6H.  Discussed with patient that meperidine is different from other opioids so that usually there is no allergy cross-reactivity but given that her allergic symptoms are vague, there is no guarantee that she will not experience those side effects again with meperidine.  We are starting her on a low dose and will discontinue if she reports those symptoms.  Discussed the typical side effects of all opioids which include sleepiness, pruritis, and nausea.    Sumeet Peng MD PGY3/CA2  Anesthesiology  [facility]  Pager: [phone]  Obstetric Anesthesiology  Spectra: [phone]  "

## 2018-10-01 NOTE — H&P
HISTORY AND PHYSICAL                                                OBSTETRICS          Subjective:       Daija Purvis is a 28 y.o.  female with IUP at 37w0d weeks gestation who presents for scheduled IOL.   Patient reports irregular contractions, denies VB or LOF. Reports good FM.     This IUP is complicated by hyperemesis gravidarum, ventricular bigeminy, and FGR (EFW 1772g 2% on )      PMHx: History reviewed. No pertinent past medical history.    PSHx: History reviewed. No pertinent surgical history.    All:   Review of patient's allergies indicates:   Allergen Reactions    Codeine        Meds:   Medications Prior to Admission   Medication Sig Dispense Refill Last Dose    calcium-vitamin D 600 mg(1,500mg) -400 unit Tab Take 2 tablets by mouth once daily. 30 tablet 3 Taking    chlorproMAZINE (THORAZINE) 25 MG tablet Take 1 tablet (25 mg total) by mouth 3 (three) times daily. 90 tablet 5     famotidine (PEPCID) 20 MG tablet Take 1 tablet (20 mg total) by mouth 2 (two) times daily. 60 tablet 4     levonorgestrel (MIRENA) 20 mcg/24 hr (5 years) IUD 1 Intra Uterine Device by Intrauterine route once. RETURN FOR INTRAUTERINE INSERTION IN THE OFFICE for 1 dose 1 each 0     ondansetron (ZOFRAN) 4 MG tablet Take 1 tablet (4 mg total) by mouth every 6 (six) hours as needed for Nausea. 60 tablet 1     prenatal vit,calc76/iron/folic (PNV 29-1 ORAL) Take 1 tablet by mouth once daily.   Taking       SH:   Social History     Socioeconomic History    Marital status: Single     Spouse name: Not on file    Number of children: Not on file    Years of education: Not on file    Highest education level: Not on file   Social Needs    Financial resource strain: Not on file    Food insecurity - worry: Not on file    Food insecurity - inability: Not on file    Transportation needs - medical: Not on file    Transportation needs - non-medical: Not on file   Occupational History    Not on file   Tobacco Use     Smoking status: Former Smoker     Types: Cigarettes    Smokeless tobacco: Never Used   Substance and Sexual Activity    Alcohol use: No    Drug use: No    Sexual activity: Yes   Other Topics Concern    Not on file   Social History Narrative    Not on file       FH:   Family History   Problem Relation Age of Onset    Colon cancer Neg Hx     Ovarian cancer Neg Hx        OBHx:   Obstetric History       T0      L0     SAB0   TAB0   Ectopic0   Multiple0   Live Births0       # Outcome Date GA Lbr Lan/2nd Weight Sex Delivery Anes PTL Lv   1 Current                   Objective:       /79 (BP Location: Right arm, Patient Position: Lying)   Pulse 86   Temp 96.6 °F (35.9 °C) (Temporal)   LMP 2018   SpO2 99%   Breastfeeding? No     Vitals:    18   BP:  111/79   BP Location:  Right arm   Patient Position:  Lying   Pulse: 81 86   Temp: 96.6 °F (35.9 °C)    TempSrc: Temporal    SpO2: 99% 99%       General:   alert, appears stated age and cooperative   Lungs:   clear to auscultation bilaterally   Heart:   regular rate and rhythm, S1, S2 normal, no murmur, click, rub or gallop   Abdomen:  soft, non-tender; bowel sounds normal; no masses,  no organomegaly   Extremities negative edema, negative erythema   FHT:  Cat 1 (reassuring) 125 bpm, Mod BTBV, + Acc, - Dec, Reactive/Reassuring                 TOCO: Q 3-4 minutes   Presentations: cephalic by ultrasound   Cervix:     Dilation: 2cm    Effacement: 80%    Station:  -3    Consistency: soft    Position: middle     Lab Review  Blood Type A POS  GBBS: negative  Rubella: Immune  RPR: NR  HIV: negative  HepB: negative       Assessment:       37w0d weeks gestation. IOL    Active Hospital Problems    Diagnosis  POA    Indication for care in labor or delivery [O75.9]  Yes    Encounter for induction of labor [Z34.90]  Not Applicable      Resolved Hospital Problems   No resolved problems to display.          Plan:     IOL at  37 wks 2/2 FGR (2%)   Risks, benefits, alternatives and possible complications have been discussed in detail with the patient.   - Consents signed and to chart  - Admit to Labor and Delivery unit  - Cervix 2/80/-3, lucero regularly, will start pitocin   - Epidural per Anesthesia   - Draw CBC, T&S  - Notify Staff  - Recheck in 2 hrs or PRN    Hyperemesis gravidarum   - Denies N/V currently  - Should resolve with delivery     Ventricular bigeminy  - Cleared by cardiology during recent admission           Jessica Herrera MD  Ob/Gyn PGY-3

## 2018-10-01 NOTE — TRANSFER OF CARE
"Anesthesia Transfer of Care Note    Patient: Daija Purvis    Procedure(s) Performed: Procedure(s) (LRB):   SECTION (N/A)    Patient location: Labor and Delivery    Anesthesia Type: epidural    Transport from OR: Transported from OR on room air with adequate spontaneous ventilation    Post pain: adequate analgesia    Post assessment: no apparent anesthetic complications and tolerated procedure well    Post vital signs: stable    Level of consciousness: awake, alert and oriented    Nausea/Vomiting: no nausea/vomiting    Complications: none    Transfer of care protocol was followed      Last vitals:   Visit Vitals  /88   Pulse 75   Temp 36 °C (96.8 °F) (Temporal)   Resp 16   Ht 5' 1.5" (1.562 m)   Wt 69.9 kg (154 lb)   LMP 2018   SpO2 100%   Breastfeeding? No   BMI 28.63 kg/m²     "

## 2018-10-01 NOTE — PLAN OF CARE
Problem: Patient Care Overview  Goal: Plan of Care Review  Outcome: Ongoing (interventions implemented as appropriate)  LACTATION NOTE:  Mother will hold baby skin-to-skin as much as possible; will attempt to latch baby onto breasts and nurse till content; will supplement baby at each feeding with EBM/formula by bottle ad althea; will double pump breasts with the Initiation pattern using the most suction that is comfortable at each feeding; will monitor baby's 24hr diaper counts; will call for assistance prn.

## 2018-10-01 NOTE — L&D DELIVERY NOTE
[facility]   Section   Operative Note    SUMMARY     Date of Procedure: 10/1/2018     Procedure: Procedure(s) (LRB):   SECTION (N/A)    Surgeon(s) and Role:     * Denver Ayala MD - Primary    Assisting Surgeon: Kranthi Restrepo MD    Pre-Operative Diagnosis: Fetal Intolerance of Labor    Post-Operative Diagnosis: Post-Op Diagnosis Codes:     * Pregnant [Z34.90]    Anesthesia: Spinal/Epidural    Technical Procedures Used: Primary Low Transverse  Delivery via Pfannenstiel Incision           Description of the Findings of the Procedure:   1. Single viable female in vertex presentation  2. Very short umbilical cord noted on delivery. Placenta sent for pathology.  3. Hemostasis noted at the conclusion of the case    Complications: No    Blood Loss: 225 mL     PreOp CBC:   Lab Results   Component Value Date    WBC 4.38 2018    HGB 12.6 2018    HCT 36.6 (L) 2018    MCV 92 2018     2018       Procedure Details   The patient was seen in the Holding Room. The risks, benefits, complications, treatment options, and expected outcomes were discussed with the patient.  The patient concurred with the proposed plan, giving informed consent.  The site of surgery properly noted/marked. The patient was taken to Operating Room, identified as Daija Purvis and the procedure verified as Primary  Delivery. A Time Out was held and the above information confirmed.    After induction of anesthesia, the patient was prepped and draped in the usual sterile manner while placed in a dorsal supine position with a left lateral tilt.  A rouse catheter was also placed per nursing. Preoperative antibiotics were administered and an allis test was performed yielding adequate anesthesia.  A Pfannenstiel incision was made and carried down through the subcutaneous tissue to the fascia. Fascial incision was made and extended transversely. The fascia was grasped  with Kocher clamps and  from the underlying rectus tissue superiorly and inferiorly. The peritoneum was identified, found to be free of adherent bowel and entered sharply with surgical scissors. Peritoneal incision was extended longitudinally. The vesico-uterine peritoneum was identified and bladder blade was inserted. The vesico-uterine peritoneum was incised transversely and the bladder flap was bluntly freed from the lower uterine segment. The bladder blade was reinserted to keep the bladder out of the operative field. A low transverse uterine incision was made with knife and extended with finger fractus. The amniotic sac was ruptured with hysterotomy and the infant was noted to be in vertex position. The fetal head was brought to the incision and elevated out of the pelvis. The patient delivered a single viable female infant without difficulty.  Infant weighed 1830 grams with Apgar scores of 8/8 at one and five minutes respectively. After the umbilical cord was clamped and cut cord blood was obtained for evaluation. Of note, the umbilical cord was found to be very short on delivery. The placenta was then removed intact and appeared normal and was sent for pathology. The uterus was then exteriorized. The uterine outline, tubes and ovaries appeared normal. The uterine incision was closed with running locked sutures of #1 Chromic. Hemostasis was observed. The uterus was returned to the abdominal cavity. Incision was reinspected and good hemostasis was noted. The abdominal cavity was irrigated to remove clots.  The bladder was filled with 180 cc of sterile milk.  It distended well, there was no leakage noted, and then allowed to drain.  The peritoneum and muscle was reapproximated with 2-0 Vicryl and # 1 Chromic, respectively. The fascia was then reapproximated with running sutures of #1 PDS. The subcutaneous fat and skin was reapproximated with 3-0 Vicryl and 4-0 Monocryl respectively.    Instrument,  sponge, and needle counts were correct prior the abdominal closure and at the conclusion of the case.     Pt tolerated procedure well and Dr. Ayala discussed with family that pt was stable and in good condition after the procedure.    I was present, scrubbed, and participated in the entire procedure.    Denver Ayala MD        Specimens:   Specimen (12h ago, onward)    None          Condition: Good    Disposition: PACU - hemodynamically stable.    Attestation: Good         Delivery Information for  Kayden Purvis    Birth information:  YOB: 2019   Time of birth: 4:05 AM   Sex: female   Head Delivery Date/Time: 10/1/2018  4:05 AM   Delivery type: , Low Transverse   Gestational Age: 37w1d    Delivery Providers    Delivering clinician:  Denver Ayala MD   Provider Role    Jet Jones, RN Registered Nurse    Patricio Crouch, [facility]    Cassidy Ahuja RN Registered Nurse    Kranthi Restrepo MD Resident            Measurements    Weight:  1830 g  Length:  41.9 cm  Head circumference:  28.6 cm  Chest circumference:  28.6 cm         Apgars    Living status:  Living  Apgars:   1 min.:   5 min.:   10 min.:   15 min.:   20 min.:     Skin color:   0  0       Heart rate:   2  2       Reflex irritability:   2  2       Muscle tone:   2  2       Respiratory effort:   2  2       Total:   8  8       Apgars assigned by:  NICU         Operative Delivery    Forceps attempted?:  No  Vacuum extractor attempted?:  No         Shoulder Dystocia    Shoulder dystocia present?:  No           Presentation    Presentation:  Vertex           Interventions/Resuscitation    Method:  NICU Attended       Cord    Vessels:  3 vessels  Complications:  None  Delayed Cord Clamping?:  No  Cord Clamped Date/Time:  10/1/2018  4:05 AM  Cord Blood Disposition:  Sent with Baby  Gases Sent?:  Yes  Stem Cell Collection (by MD):  No       Placenta    Placenta delivery date/time:  10/1/2018 0407  Placenta  removal:  Manual removal  Placenta appearance:  Intact  Placenta disposition:  pathology           Labor Events:       labor: No     Labor Onset Date/Time:         Dilation Complete Date/Time:         Start Pushing Date/Time:       Rupture Date/Time:              Rupture type:           Fluid Amount:        Fluid Color:        Fluid Odor:        Membrane Status (PeriCalm): SRM (Spontaneous Rupture)      Rupture Date/Time (PeriCalm): 10/01/2018 02:00:00      Fluid Amount (PeriCalm): Moderate      Fluid Color (PeriCalm): Clear       steroids: None     Antibiotics given for GBS: No     Induction:       Indications for induction:        Augmentation:       Indications for augmentation:       Labor complications: Fetal Intolerance     Additional complications:          Cervical ripening:                     Delivery:      Episiotomy: None     Indication for Episiotomy:       Perineal Lacerations: None Repaired:      Periurethral Laceration: none Repaired:     Labial Laceration: none Repaired:     Sulcus Laceration: none Repaired:     Vaginal Laceration: No Repaired:     Cervical Laceration: No Repaired:     Repair suture: None     Repair # of packets:       Vaginal delivery QBL (mL): 0      QBL (mL): 225     Combined Blood Loss (mL): 225     Vaginal Sweep Performed: No     Surgicount Correct: No       Other providers:       Anesthesia    Method:  Spinal, Epidural          Details (if applicable):  Trial of Labor No    Categorization: Primary    Priority: Emergency   Indications for : Fetal Intolerance of Labor   Incision Type: low transverse     Additional  information:  Forceps:    Vacuum:    Breech:    Observed anomalies    Other (Comments):

## 2018-10-01 NOTE — ANESTHESIA PREPROCEDURE EVALUATION
2018  Daija Purvis is a 28 y.o., female here for IOL in the setting of hyperemesis gravidarum and FGR (EFW 1772g 2% on ).      OB History    Para Term  AB Living   1             SAB TAB Ectopic Multiple Live Births                  # Outcome Date GA Lbr Lan/2nd Weight Sex Delivery Anes PTL Lv   1 Current                   Wt Readings from Last 1 Encounters:   18 1314 69.9 kg (154 lb)       BP Readings from Last 3 Encounters:   18 111/79   18 104/67   18 (!) 92/53       Patient Active Problem List   Diagnosis    Hyperemesis gravidarum with dehydration    Trichomoniasis    Severe malnutrition    Pregnancy, supervision, high-risk    Irregular heart beat    Ventricular bigeminy    PVCs (premature ventricular contractions)    Indication for care in labor or delivery    Encounter for induction of labor       History reviewed. No pertinent surgical history.    Social History     Socioeconomic History    Marital status: Single     Spouse name: Not on file    Number of children: Not on file    Years of education: Not on file    Highest education level: Not on file   Social Needs    Financial resource strain: Not on file    Food insecurity - worry: Not on file    Food insecurity - inability: Not on file    Transportation needs - medical: Not on file    Transportation needs - non-medical: Not on file   Occupational History    Not on file   Tobacco Use    Smoking status: Former Smoker     Types: Cigarettes    Smokeless tobacco: Never Used   Substance and Sexual Activity    Alcohol use: No    Drug use: No    Sexual activity: Yes   Other Topics Concern    Not on file   Social History Narrative    Not on file         Chemistry        Component Value Date/Time     2018 0543    K 3.7 201843     2018 0543    CO2 21  (L) 09/14/2018 0543    BUN 7 09/14/2018 0543    CREATININE 0.7 09/14/2018 0543    GLU 78 09/14/2018 0543        Component Value Date/Time    CALCIUM 9.2 09/14/2018 0543    ALKPHOS 131 09/14/2018 0543    AST 40 09/14/2018 0543    ALT 56 (H) 09/14/2018 0543    BILITOT 0.6 09/14/2018 0543    ESTGFRAFRICA >60 09/14/2018 0543    EGFRNONAA >60 09/14/2018 0543            Lab Results   Component Value Date    WBC 4.38 09/30/2018    HGB 12.6 09/30/2018    HCT 36.6 (L) 09/30/2018    MCV 92 09/30/2018     09/30/2018       No results for input(s): PT, INR, PROTIME, APTT in the last 72 hours.      Anesthesia Evaluation    I have reviewed the Patient Summary Reports.     I have reviewed the Medications.     Review of Systems  Anesthesia Hx:  No previous Anesthesia    Hematology/Oncology:  Hematology Normal        Cardiovascular:  Cardiovascular Normal     Pulmonary:  Pulmonary Normal    Renal/:  Renal/ Normal     Hepatic/GI:  Hepatic/GI Normal    Musculoskeletal:  Musculoskeletal Normal    Neurological:  Neurology Normal    Endocrine:  Endocrine Normal        Physical Exam  General:  Well nourished    Airway/Jaw/Neck:  Airway Findings: Mouth Opening: Normal Tongue: Large  Mallampati: III  Improves to II with phonation.  TM Distance: Normal, at least 6 cm  Jaw/Neck Findings:  Neck ROM: Normal ROM     Eyes/Ears/Nose:  EYES/EARS/NOSE FINDINGS: Normal   Dental:  Dental Findings: In tact   Chest/Lungs:  Chest/Lungs Findings: Normal Respiratory Rate     Heart/Vascular:  Heart Findings: Rate: Normal  Rhythm: Regular Rhythm        Mental Status:  Mental Status Findings: Normal        Anesthesia Plan  Type of Anesthesia, risks & benefits discussed:  Anesthesia Type:  CSE, general, epidural, spinal  Patient's Preference:   Intra-op Monitoring Plan: standard ASA monitors  Intra-op Monitoring Plan Comments:   Post Op Pain Control Plan:   Post Op Pain Control Plan Comments:   Induction:   IV  Beta Blocker:  Patient is not currently  on a Beta-Blocker (No further documentation required).       Informed Consent: Patient understands risks and agrees with Anesthesia plan.  Questions answered. Anesthesia consent signed with patient.  ASA Score: 2     Day of Surgery Review of History & Physical:    H&P update referred to the surgeon.         Ready For Surgery From Anesthesia Perspective.

## 2018-10-01 NOTE — PROGRESS NOTES
LABOR PROGRESS NOTE    S:  MD to bedside for cervical check. Epidural recently placed, patient more comfortable     O: Temp:  [96.6 °F (35.9 °C)-96.8 °F (36 °C)] 96.8 °F (36 °C)  Pulse:  [67-92] 92  Resp:  [16] 16  SpO2:  [99 %-100 %] 100 %  BP: (110-149)/() 128/89      FHT: 130BPM/Mod beat to beat variability/+accels/-decels Cat 1 (reassuring)  CTX: q 2-3 minutes  SVE: 3/80/-3        ASSESSMENT:   28 y.o.  at 37w1d    FHT reassuring    Active Hospital Problems    Diagnosis  POA    Indication for care in labor or delivery [O75.9]  Yes    Encounter for induction of labor [Z34.90]  Not Applicable      Resolved Hospital Problems   No resolved problems to display.     2130: 3  2200: pitocin started  0030: 3/80/-3, pit at 2 (check delayed for epidural placement)    PLAN:    Labor management  Continue Close Maternal/Fetal Monitoring.   Pitocin Augmentation per protocol  Recheck 2 hours or PRN      Jessica Herrera MD

## 2018-10-02 LAB
BASOPHILS # BLD AUTO: 0.01 K/UL
BASOPHILS NFR BLD: 0.1 %
DIFFERENTIAL METHOD: ABNORMAL
EOSINOPHIL # BLD AUTO: 0 K/UL
EOSINOPHIL NFR BLD: 0.1 %
ERYTHROCYTE [DISTWIDTH] IN BLOOD BY AUTOMATED COUNT: 14.1 %
HCT VFR BLD AUTO: 36.2 %
HGB BLD-MCNC: 12.4 G/DL
LYMPHOCYTES # BLD AUTO: 1.8 K/UL
LYMPHOCYTES NFR BLD: 18.7 %
MCH RBC QN AUTO: 31.5 PG
MCHC RBC AUTO-ENTMCNC: 34.3 G/DL
MCV RBC AUTO: 92 FL
MONOCYTES # BLD AUTO: 0.7 K/UL
MONOCYTES NFR BLD: 6.9 %
NEUTROPHILS # BLD AUTO: 7.3 K/UL
NEUTROPHILS NFR BLD: 74.1 %
PLATELET # BLD AUTO: 167 K/UL
PMV BLD AUTO: 11.9 FL
RBC # BLD AUTO: 3.94 M/UL
WBC # BLD AUTO: 9.8 K/UL

## 2018-10-02 PROCEDURE — 25000003 PHARM REV CODE 250: Performed by: ANESTHESIOLOGY

## 2018-10-02 PROCEDURE — 63600175 PHARM REV CODE 636 W HCPCS: Performed by: STUDENT IN AN ORGANIZED HEALTH CARE EDUCATION/TRAINING PROGRAM

## 2018-10-02 PROCEDURE — 25000003 PHARM REV CODE 250: Performed by: STUDENT IN AN ORGANIZED HEALTH CARE EDUCATION/TRAINING PROGRAM

## 2018-10-02 PROCEDURE — 11000001 HC ACUTE MED/SURG PRIVATE ROOM

## 2018-10-02 PROCEDURE — 85025 COMPLETE CBC W/AUTO DIFF WBC: CPT

## 2018-10-02 PROCEDURE — 36415 COLL VENOUS BLD VENIPUNCTURE: CPT

## 2018-10-02 PROCEDURE — 99233 SBSQ HOSP IP/OBS HIGH 50: CPT | Mod: ,,, | Performed by: OBSTETRICS & GYNECOLOGY

## 2018-10-02 RX ORDER — IBUPROFEN 600 MG/1
600 TABLET ORAL EVERY 6 HOURS PRN
Status: DISCONTINUED | OUTPATIENT
Start: 2018-10-02 | End: 2018-10-05 | Stop reason: HOSPADM

## 2018-10-02 RX ORDER — MEPERIDINE HYDROCHLORIDE 50 MG/1
50 TABLET ORAL EVERY 6 HOURS PRN
Status: DISCONTINUED | OUTPATIENT
Start: 2018-10-02 | End: 2018-10-03

## 2018-10-02 RX ADMIN — MEPERIDINE HYDROCHLORIDE 50 MG: 50 TABLET ORAL at 08:10

## 2018-10-02 RX ADMIN — MEPERIDINE HYDROCHLORIDE 50 MG: 50 TABLET ORAL at 01:10

## 2018-10-02 RX ADMIN — SIMETHICONE CHEW TAB 80 MG 80 MG: 80 TABLET ORAL at 09:10

## 2018-10-02 RX ADMIN — METHOCARBAMOL 500 MG: 500 TABLET ORAL at 11:10

## 2018-10-02 RX ADMIN — IBUPROFEN 600 MG: 600 TABLET ORAL at 11:10

## 2018-10-02 RX ADMIN — KETOROLAC TROMETHAMINE 30 MG: 30 INJECTION, SOLUTION INTRAMUSCULAR at 12:10

## 2018-10-02 RX ADMIN — IBUPROFEN 600 MG: 600 TABLET ORAL at 05:10

## 2018-10-02 RX ADMIN — ACETAMINOPHEN 650 MG: 325 TABLET ORAL at 12:10

## 2018-10-02 RX ADMIN — METHOCARBAMOL 500 MG: 500 TABLET ORAL at 06:10

## 2018-10-02 RX ADMIN — METHOCARBAMOL 500 MG: 500 TABLET ORAL at 12:10

## 2018-10-02 RX ADMIN — ACETAMINOPHEN 650 MG: 325 TABLET ORAL at 06:10

## 2018-10-02 NOTE — LACTATION NOTE
10/02/18 0945   Maternal Infant Assessment   Breast Shape Bilateral:;round   Breast Density Bilateral:;soft   Areola Bilateral:;elastic   Nipple(s) Bilateral:;everted   Maternal Infant Feeding   Maternal Emotional State independent;relaxed   Time Spent (min) 0-15 min   Breastfeeding Education importance of skin-to-skin contact;label/storage of breast milk;milk expression, electric pump   Equipment Type/Education   Pump Type Symphony   Breast Pump Type double electric, hospital grade   Breast Pump Flange Type hard   Breast Pump Flange Size 24 mm   Breast Pumping pumped until emptied   Pumping Frequency (times) (8or more in 24 hrs)   Lactation Referrals   Lactation Consult Follow up;Pump teaching   Lactation Interventions   Attachment Promotion counseling provided   Breastfeeding Assistance electric breast pump used;support offered   Maternal Breastfeeding Support diary/feeding log utilized;encouragement offered   Mom states she has been pumping and not getting anything. Educated mom on use of hand expression after pumping. Mom will call LC after next pump session. Reeducated mom on use & cleaning of pump parts. Pump parts sterilized for mom.

## 2018-10-02 NOTE — PLAN OF CARE
Problem: Breastfeeding (Adult,Obstetrics,Pediatric)  Goal: Signs and Symptoms of Listed Potential Problems Will be Absent, Minimized or Managed (Breastfeeding)  Signs and symptoms of listed potential problems will be absent, minimized or managed by discharge/transition of care (reference Breastfeeding (Adult,Obstetrics,Pediatric) CPG).  Outcome: Ongoing (interventions implemented as appropriate)  Mom to pump 8 or more times in 24 hours & will clean parts after each use.

## 2018-10-02 NOTE — PROGRESS NOTES
Pt ambulating, voiding, and passing flatus. Pt tolerating PO well and there is no SS of distress at this time.  Pt's bleeding has been light throughout shift and fundus is firm.   Pt. Has allergy to codeine so got a TAP block for incisional pain in labor and delivery. She has tylenol and toradol scheduled and for most of shift pain was well controlled. At about 1700 this evening, pt said her pain was 10/10 and the worst pain she's felt and felt like the tylenol and toradol were not helping at all.  Anesthesia resident notified to order something else for her pain. Robaxin scheduled and PRN meperidine 50mg PO q6h was ordered. Will continue to monitor pts pain level.

## 2018-10-02 NOTE — PROGRESS NOTES
POSTPARTUM PROGRESS NOTE     Daija Purvis is a 28 y.o. female POD #1 status post Primary  section at 37w1d in a pregnancy complicated by hyperemesis. Patient is doing well this morning. She denies nausea, vomiting, fever or chills.  Patient reports moderate abdominal pain that is adequately relieved by oral pain medications. Lochia is mild to moderate  and stable. Patient is voiding without difficulty and ambulating with mild difficulty. She has passed flatus, and has not had BM.  Patient does not plan to breast feed. Undecided for contraception.     Objective:       Temp:  [97.8 °F (36.6 °C)-99 °F (37.2 °C)] 97.8 °F (36.6 °C)  Pulse:  [72-94] 72  Resp:  [18] 18  SpO2:  [97 %-100 %] 100 %  BP: (119-151)/(77-95) 119/77    General:   alert, appears stated age and cooperative   Lungs:   clear to auscultation bilaterally   Heart:   regular rate and rhythm, S1, S2 normal, no murmur, click, rub or gallop   Abdomen:  soft, non-tender; bowel sounds normal; no masses,  no organomegaly   Uterus:  firm located at the umblicus.        Incision: Bandage in place, clean, dry and intact   Extremities: no edema, redness or tenderness in the calves or thighs     Lab Review  Recent Results (from the past 4 hour(s))   CBC auto differential    Collection Time: 10/02/18  4:39 AM   Result Value Ref Range    WBC 9.80 3.90 - 12.70 K/uL    RBC 3.94 (L) 4.00 - 5.40 M/uL    Hemoglobin 12.4 12.0 - 16.0 g/dL    Hematocrit 36.2 (L) 37.0 - 48.5 %    MCV 92 82 - 98 fL    MCH 31.5 (H) 27.0 - 31.0 pg    MCHC 34.3 32.0 - 36.0 g/dL    RDW 14.1 11.5 - 14.5 %    Platelets 167 150 - 350 K/uL    MPV 11.9 9.2 - 12.9 fL    Gran # (ANC) 7.3 1.8 - 7.7 K/uL    Lymph # 1.8 1.0 - 4.8 K/uL    Mono # 0.7 0.3 - 1.0 K/uL    Eos # 0.0 0.0 - 0.5 K/uL    Baso # 0.01 0.00 - 0.20 K/uL    Gran% 74.1 (H) 38.0 - 73.0 %    Lymph% 18.7 18.0 - 48.0 %    Mono% 6.9 4.0 - 15.0 %    Eosinophil% 0.1 0.0 - 8.0 %    Basophil% 0.1 0.0 - 1.9 %    Differential Method  Automated        I/O    Intake/Output Summary (Last 24 hours) at 10/2/2018 0641  Last data filed at 10/2/2018 0001  Gross per 24 hour   Intake 500 ml   Output 3390 ml   Net -2890 ml        Assessment:     Patient Active Problem List   Diagnosis    Hyperemesis gravidarum with dehydration    Trichomoniasis    Severe malnutrition    Pregnancy, supervision, high-risk    Irregular heart beat    Ventricular bigeminy    PVCs (premature ventricular contractions)    Indication for care in labor or delivery    Encounter for induction of labor     delivery delivered - Fetal Intolerance of Labor 5 cm        Plan:   1. Postpartum care:  - Patient doing well. Continue routine management and advances.  - Continue PO pain meds. Pain well controlled on PO demerol.  - Heme: H/h /36 >   - Encourage ambulation  - Contraception: undecided  - Lactation prn      Dispo: As patient meets milestones, will plan to discharge POD #3-4.    Rhea Solano MD  OBGYN PGY-1      Doing well, no questions,no problems.  I have reviewed the resident's note, evaluated the patient and agree with the diagnosis and management plan

## 2018-10-02 NOTE — ANESTHESIA POSTPROCEDURE EVALUATION
"Anesthesia Post Evaluation    Patient: Daija Purvis    Procedure(s) Performed: Procedure(s) (LRB):   SECTION (N/A)    Final Anesthesia Type: epidural  Patient location during evaluation: floor  Patient participation: Yes- Able to Participate  Level of consciousness: awake and alert and oriented  Post-procedure vital signs: reviewed and stable  Pain management: adequate  Airway patency: patent  PONV status at discharge: No PONV  Anesthetic complications: no      Cardiovascular status: blood pressure returned to baseline and hemodynamically stable  Respiratory status: unassisted, spontaneous ventilation and room air  Hydration status: euvolemic  Follow-up not needed.        Visit Vitals  /82 (BP Location: Right arm, Patient Position: Lying)   Pulse 78   Temp 36.7 °C (98.1 °F) (Oral)   Resp 18   Ht 5' 1.5" (1.562 m)   Wt 69.9 kg (154 lb)   LMP 2018   SpO2 97%   Breastfeeding? Yes   BMI 28.63 kg/m²       Pain/José Score: Pain Rating Prior to Med Admin: 9 (10/2/2018 11:45 AM)  Pain Rating Post Med Admin: 0 (10/2/2018  7:10 AM)        "

## 2018-10-02 NOTE — PLAN OF CARE
Copied from baby's chart:    SOCIAL WORK DISCHARGE PLANNING ASSESSMENT     Sw completed discharge planning assessment with pt's mother at pt's bedside.  Pt's mother was guarded. Education on the role of  was provided. Emotional support provided throughout assessment.     Mom reported she used THC to help with nausea during pregnancy. Mom reported her last use was in September after her last hospitalization for nausea/vomitting. Mom reported THC eventually did not help with the nausea and discontinued use. Mom educated on mandated reporting. If meconium results +THC or other substances, report to DCFS will be made.        Legal Name: Lilliana Enriquez             :  10/1/2019         Patient Active Problem List   Diagnosis    Single liveborn, born in hospital, delivered by  delivery    Fetal distress during labor in liveborn infant    [place] affected by symmetric IUGR    SGA (small for gestational age)    [place] affected by maternal noxious influence, THC            Birth Hospital:[facility]           Birth Weight:   1.83 kg (4 lb 0.6 oz)                            Gestational Age: 37w1d           Apgars    Living status:  Living  Apgars:   1 min.:   5 min.:   10 min.:   15 min.:   20 min.:     Skin color:   0  0          Heart rate:   2  2          Reflex irritability:   2  2          Muscle tone:   2  2          Respiratory effort:   2  2          Total:   8  8          Apgars assigned by:  NICU            Mother: Daija Purvis,  1990, 30 y/o  Address: [address] [place] [address]  Phone: [phone]  Employer: unknown                 Job Title: n/a     Father: Vaughn Enriquez  Address: unknown  Phone: [phone]  Signed Birth Certificate: unknown     Support person(s): Isabel Leach (aunt) [phone]     Sibling(s): none     [facility] Health Plan (formerly [place] Medicaid): Primary: Yes Secondary: No   [place] [facility]      Pediatrician: [facility]  "Raheem Alexis-Dr. Lucio       Nutrition: combination of both ebm and formula. Mom currently cannot breastfeed while on demerol pain meds from c section but can "pump and dump" to obtain milk supply.               Breast Pump:              Yes               Has already obtained from [facility] Provider               WIC:              Mom already certified; will also apply for . Mom aware that the hospital does not provide formula. Mom reported she has the fiances to purchase formula until she can get to WIC.        Essential Items: (includes car seat, crib/bassinet/pack-n-play, clothing, bottles, diapers, etc.)  Acquired      Transportation: Family/friends, Public and Medicaid transportation      Resources Given: Medicaid transportation     Potential Discharge Needs:  Report to DCFS if meconium results + for substances.            Courtney Lafont, LCSW [facility]  [email or web address]     (phone) [phone] or  Ext. [phone]  (fax) [phone]                          "

## 2018-10-03 PROCEDURE — 99232 SBSQ HOSP IP/OBS MODERATE 35: CPT | Mod: TH,,, | Performed by: OBSTETRICS & GYNECOLOGY

## 2018-10-03 PROCEDURE — 25000003 PHARM REV CODE 250: Performed by: STUDENT IN AN ORGANIZED HEALTH CARE EDUCATION/TRAINING PROGRAM

## 2018-10-03 PROCEDURE — 11000001 HC ACUTE MED/SURG PRIVATE ROOM

## 2018-10-03 RX ORDER — MEPERIDINE HYDROCHLORIDE 50 MG/1
50 TABLET ORAL EVERY 6 HOURS PRN
Status: DISPENSED | OUTPATIENT
Start: 2018-10-03 | End: 2018-10-04

## 2018-10-03 RX ADMIN — IBUPROFEN 600 MG: 600 TABLET ORAL at 06:10

## 2018-10-03 RX ADMIN — MEPERIDINE HYDROCHLORIDE 50 MG: 50 TABLET ORAL at 08:10

## 2018-10-03 RX ADMIN — IBUPROFEN 600 MG: 600 TABLET ORAL at 12:10

## 2018-10-03 RX ADMIN — MEPERIDINE HYDROCHLORIDE 50 MG: 50 TABLET ORAL at 02:10

## 2018-10-03 RX ADMIN — IBUPROFEN 600 MG: 600 TABLET ORAL at 07:10

## 2018-10-03 RX ADMIN — DOCUSATE SODIUM 200 MG: 100 CAPSULE, LIQUID FILLED ORAL at 08:10

## 2018-10-03 RX ADMIN — DOCUSATE SODIUM 200 MG: 100 CAPSULE, LIQUID FILLED ORAL at 10:10

## 2018-10-03 RX ADMIN — MEPERIDINE HYDROCHLORIDE 50 MG: 50 TABLET ORAL at 10:10

## 2018-10-03 NOTE — PLAN OF CARE
Problem: Breastfeeding (Adult,Obstetrics,Pediatric)  Goal: Signs and Symptoms of Listed Potential Problems Will be Absent, Minimized or Managed (Breastfeeding)  Signs and symptoms of listed potential problems will be absent, minimized or managed by discharge/transition of care (reference Breastfeeding (Adult,Obstetrics,Pediatric) CPG).  Outcome: Ongoing (interventions implemented as appropriate)  Mom to pump 8 or more times in 24 hrs & will discard any pumped milk while taking demerol.

## 2018-10-03 NOTE — PROGRESS NOTES
POSTPARTUM PROGRESS NOTE     Daija Purvis is a 28 y.o. female POD #2 status post Primary  section at 37w1d in a pregnancy complicated by hyperemesis. Patient is doing well this morning. She denies nausea, vomiting, fever or chills. Patient reports moderate abdominal pain that is adequately relieved by PO demerol. Lochia is mild to moderate and stable. Patient is voiding without difficulty and ambulating with mild difficulty. She has passed flatus, and has not had BM. Patient does not plan to breast feed. Desires IUD for contraception.     Objective:       Temp:  [97.7 °F (36.5 °C)-98.1 °F (36.7 °C)] 97.9 °F (36.6 °C)  Pulse:  [69-74] 74  Resp:  [17-18] 18  SpO2:  [96 %-97 %] 96 %  BP: (116-133)/(83-92) 130/89    General:   alert, appears stated age and cooperative   Lungs:   clear to auscultation bilaterally   Heart:   regular rate and rhythm, S1, S2 normal, no murmur, click, rub or gallop   Abdomen:  soft, non-tender; bowel sounds normal; no masses,  no organomegaly   Uterus:  firm located at the umblicus.    Incision: Clean, dry, and intact   Extremities: no edema, redness or tenderness in the calves or thighs     Lab Review  No results found for this or any previous visit (from the past 4 hour(s)).    I/O  No intake or output data in the 24 hours ending 10/03/18 0948     Assessment:     Patient Active Problem List   Diagnosis    Hyperemesis gravidarum with dehydration    Trichomoniasis    Severe malnutrition    Pregnancy, supervision, high-risk    Irregular heart beat    Ventricular bigeminy    PVCs (premature ventricular contractions)    Indication for care in labor or delivery    Encounter for induction of labor     delivery delivered - Fetal Intolerance of Labor 5 cm        Plan:   1. Postpartum care:  - Patient doing well. Continue routine management and advances.  - Continue PO pain meds. Pain well controlled on PO demerol.  - Heme: H/h 36 >   - Encourage ambulation  -  Contraception: IUD  - Lactation prn      Dispo: As patient meets milestones, will plan to discharge POD #3-4.    Kamryn Fermin MD  OBGYN, PGY-1

## 2018-10-03 NOTE — LACTATION NOTE
Mom states she is pumping some of the time and is not getting EBM. Reeducated mom on pumping 8 or more times in 24 hours in order to stimulate breasts. Reinforced to clean all pump parts after each pumping session & to sterilize pump parts once every 24 hours, verbalizes understanding. Pt to call if further assistance needed.

## 2018-10-04 PROCEDURE — 11000001 HC ACUTE MED/SURG PRIVATE ROOM

## 2018-10-04 PROCEDURE — 99231 SBSQ HOSP IP/OBS SF/LOW 25: CPT | Mod: ,,, | Performed by: OBSTETRICS & GYNECOLOGY

## 2018-10-04 PROCEDURE — 25000003 PHARM REV CODE 250: Performed by: STUDENT IN AN ORGANIZED HEALTH CARE EDUCATION/TRAINING PROGRAM

## 2018-10-04 RX ADMIN — IBUPROFEN 600 MG: 600 TABLET ORAL at 08:10

## 2018-10-04 RX ADMIN — DOCUSATE SODIUM 200 MG: 100 CAPSULE, LIQUID FILLED ORAL at 11:10

## 2018-10-04 RX ADMIN — IBUPROFEN 600 MG: 600 TABLET ORAL at 01:10

## 2018-10-04 RX ADMIN — DOCUSATE SODIUM 200 MG: 100 CAPSULE, LIQUID FILLED ORAL at 08:10

## 2018-10-04 RX ADMIN — IBUPROFEN 600 MG: 600 TABLET ORAL at 07:10

## 2018-10-04 RX ADMIN — SIMETHICONE CHEW TAB 80 MG 80 MG: 80 TABLET ORAL at 04:10

## 2018-10-04 RX ADMIN — MUPIROCIN 1 G: 20 OINTMENT TOPICAL at 08:10

## 2018-10-04 RX ADMIN — MEPERIDINE HYDROCHLORIDE 50 MG: 50 TABLET ORAL at 04:10

## 2018-10-04 NOTE — PLAN OF CARE
Problem: Patient Care Overview  Goal: Plan of Care Review  Outcome: Ongoing (interventions implemented as appropriate)  Patient to follow basic breastpump education and to pump 8 or more times in 24hrs to establish milk supply.

## 2018-10-04 NOTE — DISCHARGE INSTRUCTIONS
Preparation and Hygiene:    1. Shower daily.  2. Wear a clean bra each day and wash daily in warm soapy water.  3. Change wet or moist breast pads frequently.  Moist pads can promote growth of germs.  4. Actively wash your hands, paying close attention to the area around and under your fingernails, thoroughly with soap and water for 15 seconds before pumping or handling your milk.  Re-wash your hands if you touch anything (scratching your nose, answering the phone, etc) while pumping or handling your milk.   5. Before pumping your breasts, assemble the pump collection kit and have ready the sterile container and labels.  Place these items on a clean surface next to the breastpump.  6. Each time after you have finished pumping, take apart all of the parts of the breastpump collection kit and place them in a separate cleaning container (do not place them in the sink).  Be sure to remove the yellow valve from the breastshield and separate the white membrane from the yellow valve.  Rinse all of these parts with cool water.  Then use a new sponge and/or bottle brush and dishwashing detergent to clean the parts.  Rinse off the soapy water with cool water and air dry on a clean towel covered with a clean cloth.  All parts may also be washed after each use in the top rack of a .  7. Once each day, sterilize all of the parts of the breastpump collection kit.  This can be done by boiling the kit parts for 10 minutes or by using a Quick Clean Micro-Steam Bag made by [employer]  8. If condensation appears in the tubing, continue to run the pump with the tubing attached for 1-2 minutes or until the tubing is dry.   9. Notify your babys nurse or doctor if you become ill or need to take any medication, even over-the-counter medicines.        Collection and Storage of Expressed Breastmilk:         1. Pump your breasts at least 8-10 times every 24 hours.  Double pump (both breasts at  the same time) for at least 15-20  minutes using the most suction that is comfortable.    2. Write the date and time of pumping and the name of any medications you are takingon the babys pre-printed hospital identification label.   3. Place your babys pre-printed hospital identification label on each container of breastmilk.  Additional pre-printed labels can be obtained from your babys nurse.  If your expressed breastmilk is not correctly labeled, the nurse cannot feed the milk to the baby.       4. Place a brightly colored sticker on the top of each container of milk pumped during the first 30 days.  This identifies the milk as special and having higher levels of nutrients and anti-infective properties that are so important for your baby.  Additional stickers can be obtained from the lactation consultants or your babys nurse.  5.   Do not touch the inside of the storage containers or lids.  6.      Pour the amount of expressed milk needed for 1 of your babys feedings into each   storage container. Use a new container(s) for each pumping.  Additional storage   containers can be obtained from your babys nurse.        7.       Tightly screw the lid onto the container and place immediately into the       refrigerator fordaily transportation to the hospital.   Do not freeze your milk      unless asked to do so by your babys nurse.  However, if you are not able to      visit your baby each day, place the expressed breastmilk in the freezer.  8.   Expressed breastmilk should be refrigerated or frozen within 1 hour of      pumping.  9.      Do not store expressed breastmilk on the door of your refrigerator or freezer where the temperature is warmer.         Transportation of Expressed Breastmilk:    1. Refrigerated breastmilk or frozen milk should be packed tightly together in a cooler with frozen, blue gel-packs to keep the milk frozen.  DO NOT USE ICE CUBES (WET ICE) TO TRANSPORT FROZEN MILK.   A clean towel can be used to fill any extra space  between containers of frozen milk.  2.    Bring your expressed milk from home each time you visit the baby.                      Breastfeeding Discharge Instructions       Feed the baby at the earliest sign of hunger or comfort  o Hands to mouth, sucking motions  o Rooting or searching for something to suck on  o Dont wait for crying - it is a sign of distress     The feedings may be 8-12 times per 24hrs and will not follow a schedule   Avoid pacifiers and bottles for the first 4 weeks   Alternate the breast you start the feeding with, or start with the breast that feels the fullest   Switch breasts when the baby takes himself off the breast or falls asleep   Keep offering breasts until the baby looks full, no longer gives hunger signs, and stays asleep when placed on his back in the crib   If the baby is sleepy and wont wake for a feeding, put the baby skin-to-skin dressed in a diaper against the mothers bare chest   Sleep near your baby   The baby should be positioned and latched on to the breast correctly  o Chest-to-chest, chin in the breast  o Babys lips are flipped outward  o Babys mouth is stretched open wide like a shout  o Babys sucking should feel like tugging to the mother  - The baby should be drinking at the breast:  o You should hear swallowing or gulping throughout the feeding  o You should see milk on the babys lips when he comes off the breast  o Your breasts should be softer when the baby is finished feeding  o The baby should look relaxed at the end of feedings  o After the 4th day and your milk is in:  o The babys poop should turn bright yellow and be loose, watery, and seedy  o The baby should have at least 3-4 poops the size of the palm of your hand per day  o The baby should have at least 5-6 wet diapers per day  o The urine should be light yellow in color  You should drink when you are thirsty and eat a healthy diet when you are    hungry.     Take naps to get the rest  you need.   Take medications and/or drink alcohol only with permission of your obstetrician    or the babys pediatrician.  You can also call the [facility],   ([phone]), Monday-Friday, 8am-5pm Central time, to get the most   up-to-date evidence-based information on the use of medications during   pregnancy and breastfeeding.      The baby should be examined by a pediatrician at 3-5 days of age.  Once your   milk comes in, the baby should be gaining at least ½ - 1oz each day and should be back to birthweight no later than 10-14 days of age.          Community Resources    [facility] Breastfeeding Warmline: [phone]   Local [facility] clinics: provide incentives and breastpumps to eligible mothers  La Leche League International (LLLI):  mother-to-mother support group website        www.Profiterol.foc.us  Local La Leche League mother-to-mother support groups:        www.opentabs.SMASHsolar        La Leche League [facility]   Dr. Rafael Tapia website for latch videos and general information:        www.breastfeedinginc.ca  [facility] is a call center that provides information about the safety of taking medications while breastfeeding.  Call 5-552-950-1989, M-F, 8am-5pm, [place].  International Lactation Consultant Association provides resources for assistance:        www.ilca.org  [place] Breastfeeding Coalition provides informationand resources for parents  and the community    www.LaBreastfeedingSupport.org     María Ramires is a mom-to-mom support group:                             www.nolanWinFreeCandy.com//breastfeedng-support/  [employer] for Healthy Babies:  8-800-506-BABY(0469)  [employer]: a breastfeeding support group for women of color, 211.859.3188

## 2018-10-04 NOTE — PROGRESS NOTES
POSTPARTUM PROGRESS NOTE     Diaja Purvis is a 28 y.o. female POD #3 status post Primary  section at 37w1d in a pregnancy complicated by hyperemesis. Patient is doing well this morning. She denies nausea, vomiting, fever or chills. Patient reports moderate abdominal pain that is well relieved by PO demerol. Lochia is mild to moderate and decreasing. Patient is voiding without difficulty and ambulating with mild difficulty. She has passed flatus, and has not had BM. Patient does not plan to breast feed. Desires IUD for contraception.     Objective:       Temp:  [97.9 °F (36.6 °C)-98.8 °F (37.1 °C)] 98.8 °F (37.1 °C)  Pulse:  [66-71] 71  Resp:  [18] 18  SpO2:  [97 %-98 %] 98 %  BP: (120-150)/(86-91) 138/86    General:   alert, appears stated age and cooperative   Lungs:   clear to auscultation bilaterally   Heart:   regular rate and rhythm, S1, S2 normal, no murmur, click, rub or gallop   Abdomen:  soft, non-tender; bowel sounds normal; no masses,  no organomegaly   Uterus:  firm located at the umblicus.    Incision: Clean, dry, and intact   Extremities: no edema, redness or tenderness in the calves or thighs     Lab Review  No results found for this or any previous visit (from the past 4 hour(s)).    I/O  No intake or output data in the 24 hours ending 10/04/18 0923     Assessment:     Patient Active Problem List   Diagnosis    Hyperemesis gravidarum with dehydration    Trichomoniasis    Severe malnutrition    Pregnancy, supervision, high-risk    Irregular heart beat    Ventricular bigeminy    PVCs (premature ventricular contractions)    Indication for care in labor or delivery    Encounter for induction of labor     delivery delivered - Fetal Intolerance of Labor 5 cm        Plan:   1. Postpartum care:  - Patient doing well. Continue routine management and advances.  - Continue PO pain meds. Pain well controlled on PO demerol.  - Heme: H/h 36 >   - Encourage ambulation  -  Contraception: IUD  - Lactation prn      Dispo: As patient meets milestones, will plan to discharge POD #3-4.    Kamryn Fermin MD  OBGYN, PGY-1

## 2018-10-04 NOTE — LACTATION NOTE
Lactation note: LC to room, reviewed pump use, parts, cleaning, sterilizing daily. Also discussed Demerol (meperidine) for pain control. Mother is currently pumping and dumping until no longer taking Demerol. Mother to call [facility]IC regarding pump options for home. KJ number on board, encouraged mother to call for pumping session and to discuss information regarding half life of demerol and when she would be able to nurse infant. Encouraged mother to discuss pain management options with OB for discharge.  number on board.

## 2018-10-05 VITALS
WEIGHT: 154 LBS | SYSTOLIC BLOOD PRESSURE: 126 MMHG | HEIGHT: 62 IN | OXYGEN SATURATION: 98 % | DIASTOLIC BLOOD PRESSURE: 88 MMHG | HEART RATE: 81 BPM | RESPIRATION RATE: 18 BRPM | TEMPERATURE: 99 F | BODY MASS INDEX: 28.34 KG/M2

## 2018-10-05 PROBLEM — Z51.5 COMFORT MEASURES ONLY STATUS: Status: ACTIVE | Noted: 2018-10-05

## 2018-10-05 PROCEDURE — 25000003 PHARM REV CODE 250: Performed by: STUDENT IN AN ORGANIZED HEALTH CARE EDUCATION/TRAINING PROGRAM

## 2018-10-05 PROCEDURE — 99238 HOSP IP/OBS DSCHRG MGMT 30/<: CPT | Mod: ,,, | Performed by: OBSTETRICS & GYNECOLOGY

## 2018-10-05 RX ORDER — IBUPROFEN 600 MG/1
600 TABLET ORAL EVERY 6 HOURS PRN
Qty: 60 TABLET | Refills: 1 | Status: SHIPPED | OUTPATIENT
Start: 2018-10-05 | End: 2019-01-16

## 2018-10-05 RX ADMIN — IBUPROFEN 600 MG: 600 TABLET ORAL at 08:10

## 2018-10-05 RX ADMIN — DOCUSATE SODIUM 200 MG: 100 CAPSULE, LIQUID FILLED ORAL at 08:10

## 2018-10-05 RX ADMIN — IBUPROFEN 600 MG: 600 TABLET ORAL at 01:10

## 2018-10-05 NOTE — PHYSICIAN QUERY
PT Name: Daija Purvis  MR #: [id number]     Physician Query Form - Documentation Clarification      CDS/: KAREEM Johns,RNC-[email or web address]            Contact information:[email or web address]    This form is a permanent document in the medical record.     Query Date: 2018    By submitting this query, we are merely seeking further clarification of documentation. Please utilize your independent clinical judgment when addressing the question(s) below.    The Medical record reflects the following:    Supporting Clinical Findings Location in Medical Record   Patient Active Problem List   Hyperemesis gravidarum with dehydration  Severe malnutrition    Daija Purvis is a 28 y.o.  female with IUP at 37w0d weeks gestation who presents for scheduled IOL.   Patient reports irregular contractions, denies VB or LOF. Reports good FM.      This IUP is complicated by hyperemesis gravidarum, ventricular bigeminy, and FGR (EFW 1772g 2% on )      Hyperemesis gravidarum   - Denies N/V currently  - Should resolve with delivery    Discharge Summary 10/5          H&P                                                                                       Doctor, Please specify diagnosis or diagnoses associated with above clinical findings.    Provider Use Only      [ x] Past medical history only of hyperemesis gravidarum with dehydration and   Severe malnutrition  [  ] Current diagnosis of hyperemesis gravidarum with dehydration and   Severe malnutrition  [  ] Other, please specify:_________________________________________                                                                                                                   [  ] Clinically undetermined

## 2018-10-05 NOTE — PLAN OF CARE
Problem: Patient Care Overview  Goal: Plan of Care Review  Outcome: Ongoing (interventions implemented as appropriate)  Patient is to pump 8 or more times/24 hours and will feed the baby EBM via bottle per her choice and will follow education provided.

## 2018-10-05 NOTE — LACTATION NOTE
Patient plans to be discharged with her baby today. She has received clearance from the pediatrician NP to feed baby any EBM now that she is no longer taking Demerol. Patient states she plans to pump only/not nurse the baby and feed EBM via bottle and to give some formula as well. Discharge instructions. Encouraged mother to call Lactation prior to discharge for assistance or questions.

## 2018-10-05 NOTE — PROGRESS NOTES
POSTPARTUM PROGRESS NOTE     Daija Purvis is a 28 y.o. female POD #4 status post Primary  section at 37w1d in a pregnancy complicated by hyperemesis and gHTN. Patient is doing well this morning. She denies nausea, vomiting, fever or chills. Patient reports moderate abdominal pain that is well relieved by PO demerol. Lochia is mild to moderate and decreasing. Patient is voiding without difficulty and ambulating with mild difficulty. She has passed flatus, and has not had BM. Patient does not plan to breast feed. Desires IUD for contraception. Would like to go home today.    Objective:       Temp:  [98 °F (36.7 °C)-99.2 °F (37.3 °C)] 98 °F (36.7 °C)  Pulse:  [67-87] 78  Resp:  [16-18] 18  SpO2:  [97 %-100 %] 100 %  BP: (128-146)/(72-88) 138/77    General:   alert, appears stated age and cooperative   Lungs:   clear to auscultation bilaterally   Heart:   regular rate and rhythm, S1, S2 normal, no murmur, click, rub or gallop   Abdomen:  soft, non-tender; bowel sounds normal; no masses,  no organomegaly   Uterus:  firm located at the umblicus.    Incision: Clean, dry, and intact   Extremities: no edema, redness or tenderness in the calves or thighs     Lab Review  No results found for this or any previous visit (from the past 4 hour(s)).    I/O  No intake or output data in the 24 hours ending 10/05/18 0723     Assessment:     Patient Active Problem List   Diagnosis    Hyperemesis gravidarum with dehydration    Trichomoniasis    Severe malnutrition    Pregnancy, supervision, high-risk    Irregular heart beat    Ventricular bigeminy    PVCs (premature ventricular contractions)    Indication for care in labor or delivery    Encounter for induction of labor     delivery delivered - Fetal Intolerance of Labor 5 cm        Plan:   1. Postpartum care:  - Patient doing well. Continue routine management and advances.  - Continue PO pain meds. Pain well controlled with ibuprofen. Only wants to go  home with ibuprofen.  - Heme: H/h 12/36 > 12/36  - Encourage ambulation  - Contraception: IUD  - Lactation prn    2. GHTN:  - BP check at 1 week postpartum visit      Dispo: As patient meets milestones, will plan to discharge today.    Kamryn Fermin MD  OBGYN, PGY-1

## 2018-10-05 NOTE — DISCHARGE SUMMARY
Delivery Discharge Summary  Obstetrics      Primary OB Clinician: Adeline Boothe MD      Admission date: 2018  Discharge date: 10/05/2018    Disposition: To home, self care    Discharge Diagnosis List:      Patient Active Problem List   Diagnosis    Hyperemesis gravidarum with dehydration    Trichomoniasis    Severe malnutrition    Pregnancy, supervision, high-risk    Irregular heart beat    Ventricular bigeminy    PVCs (premature ventricular contractions)    Indication for care in labor or delivery    Encounter for induction of labor     delivery delivered - Fetal Intolerance of Labor 5 cm    Comfort measures only status       Procedure: , due to fetal intolerance to labor    Hospital Course:  Daija Purvis is a 28 y.o. now , POD #4 who was admitted on 2018 at 37w0d for IOL secondary to IUGR. Patient was subsequently admitted to labor and delivery unit with signed consents.     Labor course was complicated by elevated blood pressures and fetal intolerance to labor, and decision was made to proceed with delivery via  which was performed without complications.    Please see delivery note for further details. Her postpartum course was uncomplicated. On discharge day, patient's pain is controlled with oral pain medications. Pt is tolerating ambulation without SOB or CP, and regular diet without N/V. Reports lochia is mild. Denies any HA, vision changes, F/C, LE swelling. Denies any breast pain/soreness.    Pt in stable condition and ready for discharge. She has been instructed to start and/or continue medications and follow up with her obstetrics provider as listed below.    Pertinent studies:  Postpartum CBC  Lab Results   Component Value Date    WBC 9.80 10/02/2018    HGB 12.4 10/02/2018    HCT 36.2 (L) 10/02/2018    MCV 92 10/02/2018     10/02/2018       Immunization History   Administered Date(s) Administered    Influenza - Quadrivalent - PF  2018        Delivery:    Episiotomy: None   Lacerations: None   Repair suture: None   Repair # of packets:     Blood loss (ml): 0     Birth information:  YOB: 2019   Time of birth: 4:05 AM   Sex: female   Delivery type: , Low Transverse   Gestational Age: 37w1d    Delivery Clinician:      Other providers:       Additional  information:  Forceps:    Vacuum:    Breech:    Observed anomalies      Living?:           APGARS  One minute Five minutes Ten minutes   Skin color:         Heart rate:         Grimace:         Muscle tone:         Breathing:         Totals: 8  8        Placenta: Delivered:       appearance      Patient Instructions:   Current Discharge Medication List      START taking these medications    Details   docusate sodium (COLACE) 100 MG capsule Take 2 capsules (200 mg total) by mouth 2 (two) times daily.  Qty: 40 capsule, Refills: 0      !! ibuprofen (ADVIL,MOTRIN) 600 MG tablet Take 1 tablet (600 mg total) by mouth every 6 (six) hours.  Qty: 30 tablet, Refills: 1      !! ibuprofen (ADVIL,MOTRIN) 600 MG tablet Take 1 tablet (600 mg total) by mouth every 6 (six) hours as needed.  Qty: 60 tablet, Refills: 1       !! - Potential duplicate medications found. Please discuss with provider.      CONTINUE these medications which have NOT CHANGED    Details   calcium-vitamin D 600 mg(1,500mg) -400 unit Tab Take 2 tablets by mouth once daily.  Qty: 30 tablet, Refills: 3      famotidine (PEPCID) 20 MG tablet Take 1 tablet (20 mg total) by mouth 2 (two) times daily.  Qty: 60 tablet, Refills: 4    Associated Diagnoses: Hyperemesis gravidarum      levonorgestrel (MIRENA) 20 mcg/24 hr (5 years) IUD 1 Intra Uterine Device by Intrauterine route once. RETURN FOR INTRAUTERINE INSERTION IN THE OFFICE for 1 dose  Qty: 1 each, Refills: 0      prenatal vit,calc76/iron/folic (PNV 29-1 ORAL) Take 1 tablet by mouth once daily.         STOP taking these medications       chlorproMAZINE (THORAZINE)  25 MG tablet Comments:   Reason for Stopping:         ondansetron (ZOFRAN) 4 MG tablet Comments:   Reason for Stopping:               Discharge Procedure Orders   Diet Adult Regular     Notify your health care provider if you experience any of the following:   Order Comments: Heavy vaginal bleeding saturating more than 1 pad per hr for at least consecutive 2 hrs.     Notify your health care provider if you experience any of the following:  increased confusion or weakness     Notify your health care provider if you experience any of the following:  persistent dizziness, light-headedness, or visual disturbances     Notify your health care provider if you experience any of the following:  severe persistent headache     Notify your health care provider if you experience any of the following:  difficulty breathing or increased cough     Notify your health care provider if you experience any of the following:  redness, tenderness, or signs of infection (pain, swelling, redness, odor or green/yellow discharge around incision site)     Notify your health care provider if you experience any of the following:  severe uncontrolled pain     Notify your health care provider if you experience any of the following:  persistent nausea and vomiting or diarrhea     Notify your health care provider if you experience any of the following:  temperature >100.4     Activity as tolerated   Order Comments: Pelvic rest until follow up visit. Nothing in vagina -no sex, tampons, douching, etc.       Follow-up Information     [facility]. Schedule an appointment as soon as possible for a visit in 6 weeks.    Specialty:  Obstetrics and Gynecology  Why:  Postpartum visit  Contact information:  [address] [place] [id number] [phone]           [facility] In 1 week.    Specialty:  Obstetrics and Gynecology  Why:  BP check  Contact information:  [address] [place] [address]  [id number]  [phone]                  Kamryn Fermin MD  [facility], PGY-1

## 2018-10-17 ENCOUNTER — POSTPARTUM VISIT (OUTPATIENT)
Dept: OBSTETRICS AND GYNECOLOGY | Facility: CLINIC | Age: 28
End: 2018-10-17
Payer: MEDICAID

## 2018-10-17 VITALS
SYSTOLIC BLOOD PRESSURE: 118 MMHG | WEIGHT: 143.31 LBS | DIASTOLIC BLOOD PRESSURE: 88 MMHG | BODY MASS INDEX: 26.37 KG/M2 | HEIGHT: 62 IN

## 2018-10-17 DIAGNOSIS — R03.0 ELEVATED BP WITHOUT DIAGNOSIS OF HYPERTENSION: Primary | ICD-10-CM

## 2018-10-17 PROBLEM — O09.90 PREGNANCY, SUPERVISION, HIGH-RISK: Status: RESOLVED | Noted: 2018-05-29 | Resolved: 2018-10-17

## 2018-10-17 PROBLEM — I49.9 IRREGULAR HEART BEAT: Status: RESOLVED | Noted: 2018-09-14 | Resolved: 2018-10-17

## 2018-10-17 PROBLEM — Z34.90 ENCOUNTER FOR INDUCTION OF LABOR: Status: RESOLVED | Noted: 2018-09-30 | Resolved: 2018-10-17

## 2018-10-17 PROBLEM — Z51.5 COMFORT MEASURES ONLY STATUS: Status: RESOLVED | Noted: 2018-10-05 | Resolved: 2018-10-17

## 2018-10-17 PROBLEM — I49.8 VENTRICULAR BIGEMINY: Status: RESOLVED | Noted: 2018-09-14 | Resolved: 2018-10-17

## 2018-10-17 PROBLEM — A59.9 TRICHOMONIASIS: Status: RESOLVED | Noted: 2018-04-06 | Resolved: 2018-10-17

## 2018-10-17 PROBLEM — E43 SEVERE MALNUTRITION: Status: RESOLVED | Noted: 2018-05-21 | Resolved: 2018-10-17

## 2018-10-17 PROBLEM — O21.1 HYPEREMESIS GRAVIDARUM WITH DEHYDRATION: Status: RESOLVED | Noted: 2018-04-06 | Resolved: 2018-10-17

## 2018-10-17 PROCEDURE — 99999 PR PBB SHADOW E&M-EST. PATIENT-LVL III: CPT | Mod: PBBFAC,,, | Performed by: OBSTETRICS & GYNECOLOGY

## 2018-10-17 PROCEDURE — 99213 OFFICE O/P EST LOW 20 MIN: CPT | Mod: PBBFAC,PO | Performed by: OBSTETRICS & GYNECOLOGY

## 2018-11-09 ENCOUNTER — PATIENT MESSAGE (OUTPATIENT)
Dept: OBSTETRICS AND GYNECOLOGY | Facility: CLINIC | Age: 28
End: 2018-11-09

## 2018-11-14 ENCOUNTER — POSTPARTUM VISIT (OUTPATIENT)
Dept: OBSTETRICS AND GYNECOLOGY | Facility: CLINIC | Age: 28
End: 2018-11-14
Payer: MEDICAID

## 2018-11-14 VITALS
HEIGHT: 61 IN | DIASTOLIC BLOOD PRESSURE: 74 MMHG | WEIGHT: 155 LBS | BODY MASS INDEX: 29.27 KG/M2 | SYSTOLIC BLOOD PRESSURE: 120 MMHG

## 2018-11-14 DIAGNOSIS — Z30.430 ENCOUNTER FOR IUD INSERTION: ICD-10-CM

## 2018-11-14 PROCEDURE — 87624 HPV HI-RISK TYP POOLED RSLT: CPT

## 2018-11-14 PROCEDURE — 99999 PR PBB SHADOW E&M-EST. PATIENT-LVL II: CPT | Mod: PBBFAC,,, | Performed by: OBSTETRICS & GYNECOLOGY

## 2018-11-14 PROCEDURE — 87591 N.GONORRHOEAE DNA AMP PROB: CPT

## 2018-11-14 PROCEDURE — 58300 INSERT INTRAUTERINE DEVICE: CPT | Mod: PBBFAC,PO | Performed by: OBSTETRICS & GYNECOLOGY

## 2018-11-14 PROCEDURE — 0503F POSTPARTUM CARE VISIT: CPT | Mod: S$PBB,,, | Performed by: OBSTETRICS & GYNECOLOGY

## 2018-11-14 PROCEDURE — 99212 OFFICE O/P EST SF 10 MIN: CPT | Mod: PBBFAC,TH,PO | Performed by: OBSTETRICS & GYNECOLOGY

## 2018-11-14 PROCEDURE — 88175 CYTOPATH C/V AUTO FLUID REDO: CPT | Performed by: PATHOLOGY

## 2018-11-14 PROCEDURE — 88141 CYTOPATH C/V INTERPRET: CPT | Mod: ,,, | Performed by: PATHOLOGY

## 2018-11-14 RX ADMIN — LEVONORGESTREL 1 INTRA UTERINE DEVICE: 52 INTRAUTERINE DEVICE INTRAUTERINE at 10:11

## 2018-11-14 NOTE — LETTER
November 14, 2018    Daija Purvis  [address]  [facility] [address]         [place] [facility]  [address]  [facility] [address]  Phone: [phone] November 14, 2018     Patient: Daija Purvis   YOB: 1990   Date of Visit: 11/14/2018       To Whom It May Concern:    It is my medical opinion that Daija Purvis may return to full duty immediately with no restrictions.    If you have any questions or concerns, please don't hesitate to call.    Sincerely,        Adeline Boothe MD

## 2018-11-14 NOTE — PROGRESS NOTES
Daija Purvis is a 28 y.o. female  presents for a postpartum visit.  She is status post c/s 6 weeks ago.  Her hospitalization was not complicated.  She is not breastfeeding.  She desires IUD for contraception.  She denies postpartum depression.    Her last pap was normal    History reviewed. No pertinent past medical history.  Past Surgical History:   Procedure Laterality Date     SECTION N/A 10/1/2018    Procedure:  SECTION;  Surgeon: Denver Ayala MD;  Location: [facility] L&D;  Service: OB/GYN;  Laterality: N/A;     SECTION       SECTION N/A 10/1/2018    Performed by Denver Ayala MD at [facility] L&D     Review of patient's allergies indicates:   Allergen Reactions    Codeine        Current Outpatient Medications:     ibuprofen (ADVIL,MOTRIN) 600 MG tablet, Take 1 tablet (600 mg total) by mouth every 6 (six) hours., Disp: 30 tablet, Rfl: 1    ibuprofen (ADVIL,MOTRIN) 600 MG tablet, Take 1 tablet (600 mg total) by mouth every 6 (six) hours as needed., Disp: 60 tablet, Rfl: 1    prenatal vit,calc76/iron/folic (PNV 29-1 ORAL), Take 1 tablet by mouth once daily., Disp: , Rfl:     calcium-vitamin D 600 mg(1,500mg) -400 unit Tab, Take 2 tablets by mouth once daily., Disp: 30 tablet, Rfl: 3    docusate sodium (COLACE) 100 MG capsule, Take 2 capsules (200 mg total) by mouth 2 (two) times daily., Disp: 40 capsule, Rfl: 0    famotidine (PEPCID) 20 MG tablet, Take 1 tablet (20 mg total) by mouth 2 (two) times daily., Disp: 60 tablet, Rfl: 4    levonorgestrel (MIRENA) 20 mcg/24 hr (5 years) IUD, 1 Intra Uterine Device by Intrauterine route once. RETURN FOR INTRAUTERINE INSERTION IN THE OFFICE for 1 dose, Disp: 1 each, Rfl: 0    Current Facility-Administered Medications:     levonorgestrel 20 mcg/24 hr (5 years) IUD 1 Intra Uterine Device, 1 each, Intrauterine, 1 time in Clinic/HOD, Adeline Boothe MD      Vitals:    18 0955   BP: 120/74       GENERAL: no  distress  ABDOMEN: no masses, hepatosplenomegaly, no hernias  EXTERNAL GENITALIA POSTPARTUM: normal, well-healed, without lesions or masses   VAGINA POSTPARTUM: normal, well-healed, physiologic discharge, without lesions   CERVIX POSTPARTUM: normal, well-healed, without lesions   UTERUS POSTPARTUM: normal size, well involuted, firm, non-tender, ADNEXA POSTPARTUM: no masses palpable and nontender    Assessment:  Normal postpartum exam    Plan:  Routine follow up.

## 2018-11-14 NOTE — PROCEDURES
INSERTION OF INTRAUTERINE DEVICE  Date/Time: 11/14/2018 10:31 AM  Performed by: Adeline Boothe MD  Authorized by: Adeline Boothe MD     Consent:     Consent obtained:  Written    Consent given by:  Patient    Procedure risks and benefits discussed: yes      Patient questions answered: yes      Patient agrees, verbalizes understanding, and wants to proceed: yes      Educational handouts given: yes      Instructions and paperwork completed: yes    Procedure:     Pelvic exam performed: yes      Negative urine pregnancy test: yes      Cervix cleaned and prepped: yes      Speculum placed in vagina: yes      Tenaculum applied to cervix: yes      Uterus sounded: yes      Uterus sound depth (cm):  5    IUD inserted with no complications: yes      IUD type:  Mirena    Strings trimmed: yes    Post-procedure:     Patient tolerated procedure well: yes      Patient will follow up after next period: yes    Comments:      Cultures done at today's visit

## 2018-11-14 NOTE — LETTER
November 14, 2018    Daija Purvis  [address]  [facility] [address]             [place] [facility]  [address] [facility] [address]  Phone: [phone] To whom it may concern:    I am seeing Ms. Purvis for her pregnancy.  She began her maternity leave on 10/1/18.      If you have any questions or concerns, please don't hesitate to call.    Sincerely,        Adeline Boothe MD

## 2018-11-15 LAB
C TRACH DNA SPEC QL NAA+PROBE: NOT DETECTED
N GONORRHOEA DNA SPEC QL NAA+PROBE: NOT DETECTED

## 2018-12-03 LAB
HPV HR 12 DNA CVX QL NAA+PROBE: NEGATIVE
HPV16 AG SPEC QL: NEGATIVE
HPV18 DNA SPEC QL NAA+PROBE: NEGATIVE

## 2018-12-11 ENCOUNTER — TELEPHONE (OUTPATIENT)
Dept: OBSTETRICS AND GYNECOLOGY | Facility: CLINIC | Age: 28
End: 2018-12-11

## 2019-01-16 ENCOUNTER — OFFICE VISIT (OUTPATIENT)
Dept: OBSTETRICS AND GYNECOLOGY | Facility: CLINIC | Age: 29
End: 2019-01-16
Payer: MEDICAID

## 2019-01-16 VITALS
HEIGHT: 61 IN | WEIGHT: 178.38 LBS | BODY MASS INDEX: 33.68 KG/M2 | DIASTOLIC BLOOD PRESSURE: 72 MMHG | SYSTOLIC BLOOD PRESSURE: 100 MMHG

## 2019-01-16 DIAGNOSIS — N76.0 ACUTE VAGINITIS: Primary | ICD-10-CM

## 2019-01-16 PROCEDURE — 99213 OFFICE O/P EST LOW 20 MIN: CPT | Mod: PBBFAC,PO | Performed by: OBSTETRICS & GYNECOLOGY

## 2019-01-16 PROCEDURE — 99999 PR PBB SHADOW E&M-EST. PATIENT-LVL III: ICD-10-PCS | Mod: PBBFAC,,, | Performed by: OBSTETRICS & GYNECOLOGY

## 2019-01-16 PROCEDURE — 99213 OFFICE O/P EST LOW 20 MIN: CPT | Mod: S$PBB,,, | Performed by: OBSTETRICS & GYNECOLOGY

## 2019-01-16 PROCEDURE — 99213 PR OFFICE/OUTPT VISIT, EST, LEVL III, 20-29 MIN: ICD-10-PCS | Mod: S$PBB,,, | Performed by: OBSTETRICS & GYNECOLOGY

## 2019-01-16 PROCEDURE — 99999 PR PBB SHADOW E&M-EST. PATIENT-LVL III: CPT | Mod: PBBFAC,,, | Performed by: OBSTETRICS & GYNECOLOGY

## 2019-01-16 RX ORDER — METRONIDAZOLE 500 MG/1
500 TABLET ORAL EVERY 12 HOURS
Qty: 14 TABLET | Refills: 0 | Status: SHIPPED | OUTPATIENT
Start: 2019-01-16 | End: 2019-01-23

## 2019-01-16 NOTE — PROGRESS NOTES
SUBJECTIVE:   28 y.o. female  is here for follow up after pap showed trich.  Denies complaints.    ROS:  GENERAL: No fever, chills, fatigability or weight loss.  VULVAR: No pain, no lesions and no itching.  VAGINAL: No relaxation, no itching, no discharge, no abnormal bleeding and no lesions.  ABDOMEN: No abdominal pain. Denies nausea. Denies vomiting. No diarrhea. No constipation  BREAST: Denies pain. No lumps. No discharge.  URINARY: No incontinence, no nocturia, no frequency and no dysuria.  CARDIOVASCULAR: No chest pain. No shortness of breath. No leg cramps.  NEUROLOGICAL: No headaches. No vision changes.        Vitals:    19 0852   BP: 100/72         OBJECTIVE:   She appears well, afebrile.  Abdomen: benign, soft, nontender, no masses.  VULVA: Normal external female genitalia, normal urethra, normal urethral meatus  VAGINA:discharge: scant and white  CERVIXNormal  UTERUSnormal size, contour, position, consistency, mobility, non-tender  ADNEXAnormal adnexa and no mass, fullness, tenderness      ASSESSMENT:   Daija was seen today for abnormal pap smear.    Diagnoses and all orders for this visit:    Acute vaginitis  -     POCT Wet Prep    Other orders  -     metroNIDAZOLE (FLAGYL) 500 MG tablet; Take 1 tablet (500 mg total) by mouth every 12 (twelve) hours. for 7 days    wet prep:  Clue cells.  No trich

## 2019-08-27 NOTE — PROGRESS NOTES
History & Physical  Gynecology      SUBJECTIVE:     Chief Complaint: Vaginal Discharge       History of Present Illness:  Pt presents with complaint of vaginal discharge. She first noted it one week ago. Yellow discharge, no odor, no irritation. No new sex partners. No h/o STD's. Did recently change to Dove scented bath soaps. Takes one or two baths per day and admits to staying in the tub for a long time.  ROS otherwise normal.      Review of patient's allergies indicates:   Allergen Reactions    Codeine        History reviewed. No pertinent past medical history.  Past Surgical History:   Procedure Laterality Date     SECTION  10/01/2018     SECTION N/A 10/1/2018    Performed by Denver Ayala MD at [facility] L[facility]     OB History        1    Para   1    Term   1            AB        Living   1       SAB        TAB        Ectopic        Multiple   0    Live Births   1               Family History   Problem Relation Age of Onset    Colon cancer Neg Hx     Ovarian cancer Neg Hx      Social History     Tobacco Use    Smoking status: Former Smoker     Types: Cigarettes    Smokeless tobacco: Never Used   Substance Use Topics    Alcohol use: No    Drug use: No       Current Outpatient Medications   Medication Sig    levonorgestrel (MIRENA) 20 mcg/24 hr (5 years) IUD 1 Intra Uterine Device by Intrauterine route once. RETURN FOR INTRAUTERINE INSERTION IN THE OFFICE for 1 dose    prenatal vit,calc76/iron/folic (PNV 29-1 ORAL) Take 1 tablet by mouth once daily.    metroNIDAZOLE (FLAGYL) 500 MG tablet Take 1 tablet (500 mg total) by mouth every 12 (twelve) hours. for 7 days     Current Facility-Administered Medications   Medication    levonorgestrel 20 mcg/24 hr (5 years) IUD 1 Intra Uterine Device         Review of Systems:  Review of Systems   Constitutional: Negative for chills and fever.   Gastrointestinal: Negative for abdominal pain, blood in stool, constipation, diarrhea, nausea  and vomiting.   Genitourinary: Positive for vaginal discharge. Negative for dyspareunia, dysuria, frequency, hematuria, pelvic pain, vaginal bleeding, vaginal pain, vaginal dryness and vaginal odor.        OBJECTIVE:     Physical Exam:  Physical Exam   Constitutional: She appears well-developed and well-nourished. No distress.   HENT:   Head: Normocephalic and atraumatic.   Eyes: Conjunctivae are normal.   Neck: Normal range of motion.   Cardiovascular: Normal rate.   Pulmonary/Chest: Effort normal.   Genitourinary: There is no rash, tenderness, lesion or injury on the right labia. There is no rash, tenderness, lesion or injury on the left labia. Uterus is not deviated, not enlarged, not fixed and not tender. Cervix exhibits no motion tenderness, no discharge (milky white, malodorous) and no friability. Right adnexum displays no mass, no tenderness and no fullness. Left adnexum displays no mass, no tenderness and no fullness. No erythema, tenderness or bleeding in the vagina. No foreign body in the vagina. No signs of injury around the vagina. No vaginal discharge found.   Musculoskeletal: Normal range of motion.   Neurological: She is alert.   Skin: Skin is warm and dry. She is not diaphoretic.   Psychiatric: She has a normal mood and affect.   Vitals reviewed.        ASSESSMENT:       ICD-10-CM ICD-9-CM    1. Vaginal discharge N89.8 623.5 POCT Wet Prep      C. trachomatis/N. gonorrhoeae by AMP DNA   2. Bacterial vaginosis N76.0 616.10     B96.89 041.9           Plan:      Wet prep + for clue cells. + whiff.   Bacterial vaginosis handout given. Proper hygiene reviewed. Flagyl sent. Advised against alcohol use with flagyl. Pt states understanding.   GC/CT collected    Counseling time: 15 minutes    Kyra Singleton

## 2019-08-28 ENCOUNTER — OFFICE VISIT (OUTPATIENT)
Dept: OBSTETRICS AND GYNECOLOGY | Facility: CLINIC | Age: 29
End: 2019-08-28
Payer: MEDICAID

## 2019-08-28 VITALS
HEIGHT: 61 IN | BODY MASS INDEX: 35.05 KG/M2 | WEIGHT: 185.63 LBS | SYSTOLIC BLOOD PRESSURE: 110 MMHG | DIASTOLIC BLOOD PRESSURE: 70 MMHG

## 2019-08-28 DIAGNOSIS — B96.89 BACTERIAL VAGINOSIS: ICD-10-CM

## 2019-08-28 DIAGNOSIS — N76.0 BACTERIAL VAGINOSIS: ICD-10-CM

## 2019-08-28 DIAGNOSIS — N89.8 VAGINAL DISCHARGE: Primary | ICD-10-CM

## 2019-08-28 LAB
GARDNERELLA VAGINALIS: POSITIVE
OTHER MICROSC. OBSERVATIONS: ABNORMAL
POC BACTERIAL VAGINOSIS: ABNORMAL
POC CLUE CELLS: ABNORMAL
TRICHOMONAS, POC: NEGATIVE
YEAST WET PREP: NEGATIVE

## 2019-08-28 PROCEDURE — 99213 OFFICE O/P EST LOW 20 MIN: CPT | Mod: PBBFAC | Performed by: STUDENT IN AN ORGANIZED HEALTH CARE EDUCATION/TRAINING PROGRAM

## 2019-08-28 PROCEDURE — 99214 PR OFFICE/OUTPT VISIT, EST, LEVL IV, 30-39 MIN: ICD-10-PCS | Mod: S$PBB,,, | Performed by: STUDENT IN AN ORGANIZED HEALTH CARE EDUCATION/TRAINING PROGRAM

## 2019-08-28 PROCEDURE — 87491 CHLMYD TRACH DNA AMP PROBE: CPT

## 2019-08-28 PROCEDURE — 99999 PR PBB SHADOW E&M-EST. PATIENT-LVL III: ICD-10-PCS | Mod: PBBFAC,,, | Performed by: STUDENT IN AN ORGANIZED HEALTH CARE EDUCATION/TRAINING PROGRAM

## 2019-08-28 PROCEDURE — 99999 PR PBB SHADOW E&M-EST. PATIENT-LVL III: CPT | Mod: PBBFAC,,, | Performed by: STUDENT IN AN ORGANIZED HEALTH CARE EDUCATION/TRAINING PROGRAM

## 2019-08-28 PROCEDURE — 99214 OFFICE O/P EST MOD 30 MIN: CPT | Mod: S$PBB,,, | Performed by: STUDENT IN AN ORGANIZED HEALTH CARE EDUCATION/TRAINING PROGRAM

## 2019-08-28 RX ORDER — METRONIDAZOLE 500 MG/1
500 TABLET ORAL EVERY 12 HOURS
Qty: 14 TABLET | Refills: 0 | Status: SHIPPED | OUTPATIENT
Start: 2019-08-28 | End: 2019-09-04

## 2019-08-28 NOTE — PATIENT INSTRUCTIONS

## 2019-08-29 LAB
C TRACH DNA SPEC QL NAA+PROBE: NOT DETECTED
N GONORRHOEA DNA SPEC QL NAA+PROBE: NOT DETECTED

## 2019-12-16 DIAGNOSIS — Z82.79 FAMILY HISTORY OF CONGENITAL OR GENETIC CONDITION: Primary | ICD-10-CM

## 2020-07-03 ENCOUNTER — HOSPITAL ENCOUNTER (EMERGENCY)
Facility: OTHER | Age: 30
Discharge: HOME | End: 2020-07-03
Attending: EMERGENCY MEDICINE
Payer: MEDICAID

## 2020-07-03 VITALS
OXYGEN SATURATION: 100 % | TEMPERATURE: 98 F | RESPIRATION RATE: 16 BRPM | HEART RATE: 75 BPM | DIASTOLIC BLOOD PRESSURE: 68 MMHG | WEIGHT: 165 LBS | BODY MASS INDEX: 31.15 KG/M2 | HEIGHT: 61 IN | SYSTOLIC BLOOD PRESSURE: 109 MMHG

## 2020-07-03 DIAGNOSIS — R11.10 VOMITING AND DIARRHEA: Primary | ICD-10-CM

## 2020-07-03 DIAGNOSIS — N30.01 ACUTE CYSTITIS WITH HEMATURIA: ICD-10-CM

## 2020-07-03 DIAGNOSIS — R19.7 VOMITING AND DIARRHEA: Primary | ICD-10-CM

## 2020-07-03 LAB
ALBUMIN SERPL BCP-MCNC: 4.9 G/DL (ref 3.5–5.2)
ALP SERPL-CCNC: 57 U/L (ref 55–135)
ALT SERPL W/O P-5'-P-CCNC: 11 U/L (ref 10–44)
ANION GAP SERPL CALC-SCNC: 14 MMOL/L (ref 8–16)
AST SERPL-CCNC: 28 U/L (ref 10–40)
B-HCG UR QL: NEGATIVE
BACTERIA #/AREA URNS HPF: ABNORMAL /HPF
BILIRUB SERPL-MCNC: 0.8 MG/DL (ref 0.1–1)
BILIRUB UR QL STRIP: ABNORMAL
BUN SERPL-MCNC: 11 MG/DL (ref 6–20)
CALCIUM SERPL-MCNC: 9.6 MG/DL (ref 8.7–10.5)
CHLORIDE SERPL-SCNC: 109 MMOL/L (ref 95–110)
CLARITY UR: ABNORMAL
CO2 SERPL-SCNC: 13 MMOL/L (ref 23–29)
COLOR UR: ABNORMAL
CREAT SERPL-MCNC: 0.8 MG/DL (ref 0.5–1.4)
CTP QC/QA: YES
EST. GFR  (AFRICAN AMERICAN): >60 ML/MIN/1.73 M^2
EST. GFR  (NON AFRICAN AMERICAN): >60 ML/MIN/1.73 M^2
GLUCOSE SERPL-MCNC: 69 MG/DL (ref 70–110)
GLUCOSE UR QL STRIP: ABNORMAL
HGB UR QL STRIP: ABNORMAL
HYALINE CASTS #/AREA URNS LPF: 0 /LPF
KETONES UR QL STRIP: ABNORMAL
LEUKOCYTE ESTERASE UR QL STRIP: NEGATIVE
LIPASE SERPL-CCNC: 11 U/L (ref 4–60)
MICROSCOPIC COMMENT: ABNORMAL
NITRITE UR QL STRIP: POSITIVE
PH UR STRIP: 7 [PH] (ref 5–8)
POTASSIUM SERPL-SCNC: 4.9 MMOL/L (ref 3.5–5.1)
PROT SERPL-MCNC: 9.2 G/DL (ref 6–8.4)
PROT UR QL STRIP: ABNORMAL
RBC #/AREA URNS HPF: >100 /HPF (ref 0–4)
SARS-COV-2 RDRP RESP QL NAA+PROBE: NEGATIVE
SODIUM SERPL-SCNC: 136 MMOL/L (ref 136–145)
SP GR UR STRIP: >=1.03 (ref 1–1.03)
SQUAMOUS #/AREA URNS HPF: >100 /HPF
URN SPEC COLLECT METH UR: ABNORMAL
UROBILINOGEN UR STRIP-ACNC: 1 EU/DL
WBC #/AREA URNS HPF: 2 /HPF (ref 0–5)

## 2020-07-03 PROCEDURE — 96361 HYDRATE IV INFUSION ADD-ON: CPT

## 2020-07-03 PROCEDURE — 63600175 PHARM REV CODE 636 W HCPCS: Performed by: PHYSICIAN ASSISTANT

## 2020-07-03 PROCEDURE — 96365 THER/PROPH/DIAG IV INF INIT: CPT

## 2020-07-03 PROCEDURE — 99284 EMERGENCY DEPT VISIT MOD MDM: CPT | Mod: 25

## 2020-07-03 PROCEDURE — 81000 URINALYSIS NONAUTO W/SCOPE: CPT

## 2020-07-03 PROCEDURE — 81025 URINE PREGNANCY TEST: CPT | Performed by: EMERGENCY MEDICINE

## 2020-07-03 PROCEDURE — 96375 TX/PRO/DX INJ NEW DRUG ADDON: CPT

## 2020-07-03 PROCEDURE — 80053 COMPREHEN METABOLIC PANEL: CPT

## 2020-07-03 PROCEDURE — U0002 COVID-19 LAB TEST NON-CDC: HCPCS

## 2020-07-03 PROCEDURE — 83690 ASSAY OF LIPASE: CPT

## 2020-07-03 PROCEDURE — 25000003 PHARM REV CODE 250: Performed by: PHYSICIAN ASSISTANT

## 2020-07-03 RX ORDER — ONDANSETRON 4 MG/1
4 TABLET, ORALLY DISINTEGRATING ORAL EVERY 8 HOURS PRN
Qty: 30 TABLET | Refills: 0 | Status: SHIPPED | OUTPATIENT
Start: 2020-07-03 | End: 2020-09-09

## 2020-07-03 RX ORDER — SULFAMETHOXAZOLE AND TRIMETHOPRIM 800; 160 MG/1; MG/1
1 TABLET ORAL 2 TIMES DAILY
Qty: 14 TABLET | Refills: 0 | Status: SHIPPED | OUTPATIENT
Start: 2020-07-03 | End: 2020-07-10

## 2020-07-03 RX ORDER — ONDANSETRON 2 MG/ML
4 INJECTION INTRAMUSCULAR; INTRAVENOUS
Status: COMPLETED | OUTPATIENT
Start: 2020-07-03 | End: 2020-07-03

## 2020-07-03 RX ADMIN — ONDANSETRON 4 MG: 2 SOLUTION INTRAMUSCULAR; INTRAVENOUS at 04:07

## 2020-07-03 RX ADMIN — CEFTRIAXONE 1 G: 1 INJECTION, SOLUTION INTRAVENOUS at 05:07

## 2020-07-03 RX ADMIN — SODIUM CHLORIDE 1000 ML: 0.9 INJECTION, SOLUTION INTRAVENOUS at 04:07

## 2020-07-03 NOTE — ED PROVIDER NOTES
Encounter Date: 7/3/2020       History     Chief Complaint   Patient presents with    Emesis     Reports n/v/d that started today, denies fever     Afebrile 29-year-old female presents the ED for evaluation GI complaints.  Patient states that she began this morning with nausea, multiple episodes of nonbloody emesis and multiple episodes of watery diarrhea.  She states symptoms have been constant since onset.  She endorses approximately 20 episodes of emesis and diarrhea.  She denies any other associated symptoms including fever, URI symptoms, abdominal pain, bloody emesis,  symptoms, bloody stool or rash.  She denies any sick contacts, recent travel or recent antibiotic use.  She denies any concern of suspected food contamination.  She has not tried any medications for the symptoms.  Patient does endorse a history of frequent marijuana use however denies any history of cyclical vomiting.    The history is provided by the patient.     Review of patient's allergies indicates:   Allergen Reactions    Codeine      No past medical history on file.  Past Surgical History:   Procedure Laterality Date     SECTION N/A 10/1/2018    Procedure:  SECTION;  Surgeon: Denver Ayala MD;  Location: [facility] L&D;  Service: OB/GYN;  Laterality: N/A;     SECTION  10/01/2018     Family History   Problem Relation Age of Onset    Colon cancer Neg Hx     Ovarian cancer Neg Hx      Social History     Tobacco Use    Smoking status: Former Smoker     Types: Cigarettes    Smokeless tobacco: Never Used   Substance Use Topics    Alcohol use: No    Drug use: No     Review of Systems   Constitutional: Positive for appetite change. Negative for chills and fever.   HENT: Negative for congestion and sore throat.    Respiratory: Negative for cough and shortness of breath.    Cardiovascular: Negative for chest pain.   Gastrointestinal: Positive for diarrhea, nausea and vomiting. Negative for abdominal distention,  abdominal pain, blood in stool and constipation.   Genitourinary: Positive for vaginal bleeding (Reports currently menstruating). Negative for dysuria and vaginal discharge.   Musculoskeletal: Negative for arthralgias, back pain and myalgias.   Skin: Negative for rash.   Allergic/Immunologic: Negative for immunocompromised state.   Neurological: Negative for weakness, light-headedness and headaches.   Hematological: Does not bruise/bleed easily.       Physical Exam     Initial Vitals [07/03/20 1452]   BP Pulse Resp Temp SpO2   132/84 69 18 98.3 °F (36.8 °C) 99 %      MAP       --         Physical Exam    Nursing note and vitals reviewed.  Constitutional: Vital signs are normal. She appears well-developed and well-nourished. She is cooperative.  Non-toxic appearance. She does not appear ill. No distress.   HENT:   Head: Normocephalic and atraumatic.   Mouth/Throat: Mucous membranes are not pale and dry.   Eyes: Conjunctivae and lids are normal.   Neck: Neck supple. No neck rigidity.   Cardiovascular: Normal rate and regular rhythm.   Pulmonary/Chest: Breath sounds normal. No respiratory distress. She has no wheezes. She has no rhonchi.   Abdominal: Soft. Normal appearance. She exhibits no distension. There is no abdominal tenderness. There is no rigidity, no rebound, no guarding and no CVA tenderness.   Musculoskeletal: Normal range of motion.   Neurological: She is alert and oriented to person, place, and time. GCS eye subscore is 4. GCS verbal subscore is 5. GCS motor subscore is 6.   Skin: Skin is warm, dry and intact. No rash noted.   Psychiatric: She has a normal mood and affect. Her speech is normal and behavior is normal. Thought content normal.         ED Course   Procedures  Labs Reviewed   COMPREHENSIVE METABOLIC PANEL - Abnormal; Notable for the following components:       Result Value    CO2 13 (*)     Glucose 69 (*)     Total Protein 9.2 (*)     All other components within normal limits   URINALYSIS,  REFLEX TO URINE CULTURE - Abnormal; Notable for the following components:    Color, UA Red (*)     Appearance, UA Cloudy (*)     Specific Gravity, UA >=1.030 (*)     Protein, UA 3+ (*)     Glucose, UA Trace (*)     Ketones, UA 1+ (*)     Bilirubin (UA) 1+ (*)     Occult Blood UA 3+ (*)     Nitrite, UA Positive (*)     All other components within normal limits    Narrative:     Specimen Source->Urine   URINALYSIS MICROSCOPIC - Abnormal; Notable for the following components:    RBC, UA >100 (*)     Bacteria Many (*)     All other components within normal limits    Narrative:     Specimen Source->Urine   LIPASE   SARS-COV-2 RNA AMPLIFICATION, QUAL   CBC W/ AUTO DIFFERENTIAL   POCT URINE PREGNANCY          Imaging Results    None          Medical Decision Making:   Initial Assessment:   29-year-old female who is typically well presents the ED for evaluation of nausea, vomiting and diarrhea.  She denies any other associated complaints.  She appears well.  Mucous membranes are noted to be mildly dry.  Lungs CTA; heart regular rate and rhythm.  Abdomen is soft and nontender with no CVA tenderness.  Skin free of rash, pallor diaphoresis.  Remaining exam grossly unremarkable.  Differential Diagnosis:   Differential Diagnosis includes, but is not limited to:   SBO/volvulus, incarcerated/strangulated hernia, intussusception, ileus, appendicitis, cholecystitis, cholangitis, diverticulitis, esophagitis, hepatitis, nephrolithiasis, pancreatitis, gastroenteritis, colitis, IBD/IBS, biliary colic, GERD, PUD, constipation, UTI/pyelonephritis,  disorder.    Clinical Tests:   Lab Tests: Ordered and Reviewed  ED Management:  Labs to assess for acute etiology of patient's nausea, vomiting diarrhea.  Vitals are stable.  Labs notable for mild hypoglycemia at 69.  UA does reveal nitrite positive UTI with many bacteria.  There is noted to be greater than 100 red blood cells which could be related to infectious process however patient does  report she is currently menstruating.  Did consider pyelonephritis given the nausea, vomiting diarrhea however lower suspicion given no leukocytosis, CVA tenderness or fever at this time. UTI  covered with Rocephin here although she does continue to deny any  complaints.  On reassessment patient reports that she was feeling much improved and was expressing desire to eat.  We will give a p.o. trial and sent home with short course of antiemetics and antibiotics. Successful PO challenge.  Strict instructions to follow up with primary care physician or reference provided for further assessment and evaluation. Given instructions to return for any acute symptoms and verbalized understanding of this medical plan.                                   Clinical Impression:       ICD-10-CM ICD-9-CM   1. Vomiting and diarrhea  R11.10 787.03    R19.7 787.91   2. Acute cystitis with hematuria  N30.01 595.0                                JEWEL Kirby  07/04/20 1054

## 2020-07-03 NOTE — ED TRIAGE NOTES
Pt presents to ED for evaluation of n/v/d that started this AM. She denies abdominal pain. Denies urinary symptoms. She is AAO x 3, answers questions appropriately.

## 2020-07-03 NOTE — Clinical Note
Daija Purvis was seen and treated in our emergency department on 7/3/2020.  She may return to work on 07/05/2020.       If you have any questions or concerns, please don't hesitate to call.      JEWEL Kirby

## 2020-09-09 ENCOUNTER — OFFICE VISIT (OUTPATIENT)
Dept: OBSTETRICS AND GYNECOLOGY | Facility: CLINIC | Age: 30
End: 2020-09-09
Payer: MEDICAID

## 2020-09-09 VITALS
BODY MASS INDEX: 32.7 KG/M2 | HEIGHT: 62 IN | DIASTOLIC BLOOD PRESSURE: 82 MMHG | SYSTOLIC BLOOD PRESSURE: 122 MMHG | WEIGHT: 177.69 LBS

## 2020-09-09 DIAGNOSIS — A59.9 TRICHOMONIASIS: ICD-10-CM

## 2020-09-09 DIAGNOSIS — E66.09 CLASS 1 OBESITY DUE TO EXCESS CALORIES WITHOUT SERIOUS COMORBIDITY WITH BODY MASS INDEX (BMI) OF 33.0 TO 33.9 IN ADULT: ICD-10-CM

## 2020-09-09 DIAGNOSIS — Z01.419 WELL WOMAN EXAM WITH ROUTINE GYNECOLOGICAL EXAM: Primary | ICD-10-CM

## 2020-09-09 PROBLEM — E66.811 CLASS 1 OBESITY DUE TO EXCESS CALORIES WITHOUT SERIOUS COMORBIDITY WITH BODY MASS INDEX (BMI) OF 33.0 TO 33.9 IN ADULT: Status: ACTIVE | Noted: 2020-09-09

## 2020-09-09 PROCEDURE — 99395 PR PREVENTIVE VISIT,EST,18-39: ICD-10-PCS | Mod: S$PBB,,, | Performed by: OBSTETRICS & GYNECOLOGY

## 2020-09-09 PROCEDURE — 99999 PR PBB SHADOW E&M-EST. PATIENT-LVL III: ICD-10-PCS | Mod: PBBFAC,,, | Performed by: OBSTETRICS & GYNECOLOGY

## 2020-09-09 PROCEDURE — 99213 OFFICE O/P EST LOW 20 MIN: CPT | Mod: PBBFAC,PO | Performed by: OBSTETRICS & GYNECOLOGY

## 2020-09-09 PROCEDURE — 99395 PREV VISIT EST AGE 18-39: CPT | Mod: S$PBB,,, | Performed by: OBSTETRICS & GYNECOLOGY

## 2020-09-09 PROCEDURE — 99999 PR PBB SHADOW E&M-EST. PATIENT-LVL III: CPT | Mod: PBBFAC,,, | Performed by: OBSTETRICS & GYNECOLOGY

## 2020-09-09 RX ORDER — METRONIDAZOLE 500 MG/1
500 TABLET ORAL EVERY 12 HOURS
Qty: 14 TABLET | Refills: 1 | Status: SHIPPED | OUTPATIENT
Start: 2020-09-09 | End: 2020-09-23

## 2020-09-09 NOTE — PROGRESS NOTES
SUBJECTIVE:   30 y.o. female   for annual routine Pap and checkup. Patient's last menstrual period was 2020 (approximate)..  She has complaints of urinary frequency. Of note, patient was treated for a UTI last month and states that burning symptoms resolved but frequency is still present. Also reporting vaginal odor, but denies abnormal discharge or bleeding. She reports her periods are irregular due to her Mirena, reports that they are light but unpredictable and reports significant cramping that causes her to miss work on the first two days of her cycles. She denies other symptoms including abnormal weight gain, N/V/D, pain with sex. She is sexually active with one male partner and they do not use condoms regularly, using Mirena for contraception.     History reviewed. No pertinent past medical history.  Past Surgical History:   Procedure Laterality Date     SECTION N/A 10/1/2018    Procedure:  SECTION;  Surgeon: Denver Ayala MD;  Location: [facility];  Service: OB/GYN;  Laterality: N/A;     SECTION  10/01/2018     Social History     Socioeconomic History    Marital status: Single     Spouse name: Not on file    Number of children: Not on file    Years of education: Not on file    Highest education level: Not on file   Occupational History    Not on file   Social Needs    Financial resource strain: Not on file    Food insecurity     Worry: Not on file     Inability: Not on file    Transportation needs     Medical: Not on file     Non-medical: Not on file   Tobacco Use    Smoking status: Former Smoker     Types: Cigarettes    Smokeless tobacco: Never Used   Substance and Sexual Activity    Alcohol use: No    Drug use: No    Sexual activity: Yes     Partners: Male     Birth control/protection: I.U.D.   Lifestyle    Physical activity     Days per week: Not on file     Minutes per session: Not on file    Stress: Not on file   Relationships    Social  connections     Talks on phone: Not on file     Gets together: Not on file     Attends Jainism service: Not on file     Active member of club or organization: Not on file     Attends meetings of clubs or organizations: Not on file     Relationship status: Not on file   Other Topics Concern    Not on file   Social History Narrative    Not on file     Family History   Problem Relation Age of Onset    Colon cancer Neg Hx     Ovarian cancer Neg Hx      OB History    Para Term  AB Living   1 1 1     1   SAB TAB Ectopic Multiple Live Births         0 1      # Outcome Date GA Lbr Lan/2nd Weight Sex Delivery Anes PTL Lv   1 Term 10/01/18 37w1d  1.83 kg (4 lb 0.6 oz) F CS-LTranv Spinal, EPI N LAENTTE      Complications: Fetal Intolerance         Current Outpatient Medications   Medication Sig Dispense Refill    metroNIDAZOLE (FLAGYL) 500 MG tablet Take 1 tablet (500 mg total) by mouth every 12 (twelve) hours. for 14 days 14 tablet 1     Current Facility-Administered Medications   Medication Dose Route Frequency Provider Last Rate Last Dose    levonorgestrel 20 mcg/24 hr (5 years) IUD 1 Intra Uterine Device  1 each Intrauterine 1 time in Clinic/HOD Adeline Boothe MD         Allergies: Codeine     ROS:  Constitutional: no weight loss, weight gain, fever, fatigue  Eyes:  No vision changes, glasses/contacts  ENT/Mouth: No ulcers, sinus problems, ears ringing, headache  Cardiovascular: No inability to lie flat, chest pain, exercise intolerance, swelling, heart palpitations  Respiratory: No wheezing, coughing blood, shortness of breath, or cough  Gastrointestinal: No diarrhea, bloody stool, nausea/vomiting, constipation, gas, hemorrhoids  Genitourinary: No blood in urine, painful urination, urgency of urination, frequency of urination, incomplete emptying, incontinence, abnormal bleeding, painful periods, heavy periods, vaginal discharge, vaginal odor, painful intercourse, sexual problems, bleeding after  "intercourse.  Musculoskeletal: No muscle weakness  Skin/Breast: No painful breasts, nipple discharge, masses, rash, ulcers  Neurological: No passing out, seizures, numbness, headache  Endocrine: No diabetes, hypothyroid, hyperthyroid, hot flashes, hair loss, abnormal hair growth, ance  Psychiatric: No depression, crying  Hematologic: No bruises, bleeding, swollen lymph nodes, anemia.      OBJECTIVE:   The patient appears well, alert, oriented x 3, in no distress.  /82   Ht 5' 1.5" (1.562 m)   Wt 80.6 kg (177 lb 11.1 oz)   LMP 08/31/2020 (Approximate)   Breastfeeding No   BMI 33.03 kg/m²   NECK: negative, no thyromegaly, trachea midline  SKIN: normal and no acne, striae, hirsutism  BREAST EXAM: breasts appear normal, no suspicious masses, no skin or nipple changes or axillary nodes  ABDOMEN: soft, non-tender; bowel sounds normal; no masses,  no organomegaly and no hernias, masses, or hepatosplenomegaly  GENITALIA: normal external genitalia, no erythema, no discharge  URETHRA: normal appearing urethra with no masses, tenderness or lesions and normal urethra, normal urethral meatus  VAGINA: Normal  CERVIX: no lesions or cervical motion tenderness, IUD strings visualized   UTERUS: normal size, contour, position, consistency, mobility, non-tender  ADNEXA: normal adnexa     Wet prep: + for trichomonas     \  ASSESSMENT:   .Daija was seen today for well woman and urinary frequency.    Diagnoses and all orders for this visit:    Well woman exam with routine gynecological exam  -RTC in 1 year for WWE    Class 1 obesity due to excess calories without serious comorbidity with body mass index (BMI) of 33.0 to 33.9 in adult  -     Lipid panel; Future    Trichomoniasis  -Positive trichomonas on wet prep   -Counseled patient regarding sexual transmission of trichomonas including asymptomatic infection in men  -Prescription for Flagyl sent to patient's pharmacy including EPT for partner  -Advised to abstain from " intercourse or use condoms for 2 weeks after starting treatment to decrease risk of reinfection     Other orders  -     metroNIDAZOLE (FLAGYL) 500 MG tablet; Take 1 tablet (500 mg total) by mouth every 12 (twelve) hours. for 14 days      Lisette Anderson MD   PGY-1, OB-GYN

## 2020-12-18 ENCOUNTER — HOSPITAL ENCOUNTER (EMERGENCY)
Facility: OTHER | Age: 30
Discharge: HOME | End: 2020-12-18
Attending: EMERGENCY MEDICINE
Payer: MEDICAID

## 2020-12-18 VITALS
TEMPERATURE: 99 F | SYSTOLIC BLOOD PRESSURE: 131 MMHG | HEART RATE: 90 BPM | RESPIRATION RATE: 18 BRPM | BODY MASS INDEX: 33.04 KG/M2 | HEIGHT: 61 IN | WEIGHT: 175 LBS | OXYGEN SATURATION: 99 % | DIASTOLIC BLOOD PRESSURE: 88 MMHG

## 2020-12-18 DIAGNOSIS — R05.9 COUGH IN ADULT: ICD-10-CM

## 2020-12-18 DIAGNOSIS — J06.9 VIRAL URI WITH COUGH: Primary | ICD-10-CM

## 2020-12-18 LAB
B-HCG UR QL: NEGATIVE
CTP QC/QA: YES
CTP QC/QA: YES
SARS-COV-2 RDRP RESP QL NAA+PROBE: NEGATIVE

## 2020-12-18 PROCEDURE — U0002 COVID-19 LAB TEST NON-CDC: HCPCS | Performed by: EMERGENCY MEDICINE

## 2020-12-18 PROCEDURE — 99284 EMERGENCY DEPT VISIT MOD MDM: CPT | Mod: 25

## 2020-12-18 PROCEDURE — 81025 URINE PREGNANCY TEST: CPT | Performed by: EMERGENCY MEDICINE

## 2020-12-18 NOTE — ED NOTES
Pt requesting covid test for her job. Pt with symptoms of runny nose, nasal congestion, and dry cough x 2 weeks. Denies fevers, chills, SOB. Pt AAOx4 and appropriate at this time. Respirations even and unlabored. No acute distress noted.

## 2020-12-18 NOTE — Clinical Note
"Daija"Allison Purvis was seen and treated in our emergency department on 12/18/2020.     COVID-19 is present in our communities across the state. There is limited testing for COVID at this time, so not all patients can be tested. In this situation, your employee meets the following criteria:    Daija Purvis has met the criteria for COVID-19 testing and has a NEGATIVE result. The employee can return to work once they are asymptomatic for 72 hours without the use of fever reducing medications (Tylenol, Motrin, etc).     If you have any questions or concerns, or if I can be of further assistance, please do not hesitate to contact me.    Sincerely,              RN"

## 2020-12-18 NOTE — ED PROVIDER NOTES
"Encounter Date: 2020    SCRIBE #1 NOTE: I, Destiny Woods, am scribing for, and in the presence of, Dr. Denton.       History     Chief Complaint   Patient presents with    Cough     dry cough x 2 weeks with congestion and runny nose.     Time seen by provider: 10:32 AM    This is a 30 y.o. female who presents with complaint of persistent cough and "cold" for the past several days. She denies any shortness of breath. She works at [employer] and needs to be tested for COVID-19 due to cough. She smokes cigarettes and marijuana. She denies additional complaints at this time.    The history is provided by the patient.     Review of patient's allergies indicates:   Allergen Reactions    Codeine      History reviewed. No pertinent past medical history.  Past Surgical History:   Procedure Laterality Date     SECTION N/A 10/1/2018    Procedure:  SECTION;  Surgeon: Denver Ayala MD;  [facility];  Service: OB/GYN;  Laterality: N/A;     SECTION  10/01/2018     Family History   Problem Relation Age of Onset    Colon cancer Neg Hx     Ovarian cancer Neg Hx      Social History     Tobacco Use    Smoking status: Former Smoker     Types: Cigarettes    Smokeless tobacco: Never Used   Substance Use Topics    Alcohol use: No    Drug use: Yes     Types: Marijuana     Review of Systems   Constitutional: Negative for chills and fever.   HENT: Negative for sore throat.    Eyes: Negative for photophobia and redness.   Respiratory: Positive for cough. Negative for shortness of breath.    Cardiovascular: Negative for chest pain.   Gastrointestinal: Negative for abdominal pain, nausea and vomiting.   Musculoskeletal: Negative for back pain.   Skin: Negative for rash.   Neurological: Negative for weakness, light-headedness and headaches.   Psychiatric/Behavioral: Negative for confusion.       Physical Exam     Initial Vitals [20 0937]   BP Pulse Resp Temp SpO2   131/88 90 18 98.6 °F (37 " °C) 99 %      MAP       --         Physical Exam    Nursing note and vitals reviewed.  Constitutional: She appears well-developed and well-nourished. She is not diaphoretic. No distress.   HENT:   Head: Normocephalic and atraumatic.   Mouth/Throat: Oropharynx is clear and moist and mucous membranes are normal.   Mucous membranes are moist.   Eyes: Conjunctivae and EOM are normal. Pupils are equal, round, and reactive to light. No scleral icterus.   Conjunctivae are pink, clear, and intact.    Neck: Normal range of motion. Neck supple.   Cardiovascular: Normal rate, regular rhythm, S1 normal, S2 normal and normal heart sounds. Exam reveals no gallop and no friction rub.    No murmur heard.  Pulmonary/Chest: Breath sounds normal. No respiratory distress. She has no wheezes. She has no rhonchi. She has no rales.   Lungs clear to auscultation bilaterally.    Abdominal: Soft. Bowel sounds are normal. There is no abdominal tenderness. There is no rebound and no guarding.   No audible bruits.    Musculoskeletal: Normal range of motion. No tenderness or edema.      Comments: No lower extremity edema.    Lymphadenopathy:     She has no cervical adenopathy.   Neurological: She is alert and oriented to person, place, and time.   Skin: Skin is warm and dry. Capillary refill takes less than 2 seconds. No rash noted. No pallor.   No skin tenting. No lesions.   Psychiatric: She has a normal mood and affect. Her behavior is normal. Judgment and thought content normal.         ED Course   Procedures  Labs Reviewed   POCT URINE PREGNANCY   SARS-COV-2 RDRP GENE    Narrative:     This test utilizes isothermal nucleic acid amplification   technology to detect the SARS-CoV-2 RdRp nucleic acid segment.   The analytical sensitivity (limit of detection) is 125 genome   equivalents/mL.   A POSITIVE result implies infection with the SARS-CoV-2 virus;   the patient is presumed to be contagious.     A NEGATIVE result means that SARS-CoV-2  nucleic acids are not   present above the limit of detection. A NEGATIVE result should be   treated as presumptive. It does not rule out the possibility of   COVID-19 and should not be the sole basis for treatment decisions.   If COVID-19 is strongly suspected based on clinical and exposure   history, re-testing using an alternate molecular assay should be   considered.   This test is only for use under the Food and Drug   Administration s Emergency Use Authorization (EUA).   Commercial kits are provided by [employer].   Performance characteristics of the EUA have been independently   verified by [facility] Department of   Pathology and Laboratory Medicine.   _________________________________________________________________   The authorized Fact Sheet for Healthcare Providers and the authorized Fact   Sheet for Patients of the ID NOW COVID-19 are available on the FDA   website:     https://www.fda.gov/media/599421/download  https://www.fda.gov/media/608851/download              Imaging Results          X-Ray Chest AP Portable (Final result)  Result time 12/18/20 10:56:12    Final result by Tavo Burns MD (12/18/20 10:56:12)                 Impression:      No acute chest disease identified.  No detrimental change noted when compared to prior examination dated 05/18/2018.      Electronically signed by: Tavo Burns MD  Date:    12/18/2020  Time:    10:56             Narrative:    EXAMINATION:  XR CHEST AP PORTABLE    CLINICAL HISTORY:  Cough    TECHNIQUE:  Single frontal view of the chest was performed.    COMPARISON:  05/18/2018.    FINDINGS:  The heart is not enlarged.  Superior mediastinal structures are unremarkable.  Pulmonary vasculature is within normal limits.  The lungs are free of focal consolidations.  There is no evidence for pneumothorax or large pleural effusions.  Bony structures are grossly intact.                                 Medical Decision Making:   History:   Old  Medical Records: I decided to obtain old medical records.  Independently Interpreted Test(s):   I have ordered and independently interpreted X-rays - see prior notes.  Clinical Tests:   Lab Tests: Ordered and Reviewed  Radiological Study: Ordered and Reviewed            Scribe Attestation:   Scribe #1: I performed the above scribed service and the documentation accurately describes the services I performed. I attest to the accuracy of the note.    Attending Attestation:           Physician Attestation for Scribe:  Physician Attestation Statement for Scribe #1: I, Dr. Becker, reviewed documentation, as scribed by Destiny Woods in my presence, and it is both accurate and complete.         Attending ED Notes:   Emergent evaluation of a 30-year-old female with complaint of intermittent cough for the past 2 weeks.  Patient states that she need a negative COVID test so she may go back to work.  Patient is afebrile, nontoxic-appearing stable vital signs.  Lungs are clear to auscultation bilaterally.  Oxygen saturation is 100% on room air.  Patient in no acute respiratory distress.  COVID screen is negative.  No acute findings on chest x-ray.  The patient is extensively counseled her diagnosis and treatment including all diagnostic and physical exam findings.  The patient discharged good condition and directed follow-up with her PCP in the next 24-48 hours.                    Clinical Impression:     1. Viral URI with cough    2. Cough in adult            ED Disposition Condition    Discharge Good        ED Prescriptions     None        Follow-up Information     Follow up With Specialties Details Why Contact Info    Lizet Abreu MD Gastroenterology, Emergency Medicine In 3 days  [address]  [address] [facility] [address]  [phone]                                         Juan Becker MD  12/18/20 8635

## 2021-04-26 ENCOUNTER — PATIENT MESSAGE (OUTPATIENT)
Dept: RESEARCH | Facility: HOSPITAL | Age: 31
End: 2021-04-26

## 2021-06-22 ENCOUNTER — PATIENT MESSAGE (OUTPATIENT)
Dept: OBSTETRICS AND GYNECOLOGY | Facility: CLINIC | Age: 31
End: 2021-06-22

## 2021-06-22 RX ORDER — NITROFURANTOIN 25; 75 MG/1; MG/1
100 CAPSULE ORAL 2 TIMES DAILY
Qty: 14 CAPSULE | Refills: 0 | Status: SHIPPED | OUTPATIENT
Start: 2021-06-22 | End: 2021-06-29

## 2022-04-11 NOTE — ED PROVIDER NOTES
"Encounter Date: 3/30/2018    SCRIBE #1 NOTE: I, Mihai Najera, am scribing for, and in the presence of, Dr. Gomez.       History     Chief Complaint   Patient presents with    Hematemesis     x 4 days unrelieved from phenergan and zofran PO. Pt 9 weeks pregnant, vomiting throughout pregnancy, blood in emesis has gotten worse x 4 days.      5:10 PM    Patient is a 27 y.o. female,  approximately 9 weeks gestational ago who presents to the ED with complaint of hematemesis. She reports persistent nausea and vomiting for the last two weeks with new onset of hematemesis four days ago, which she describes as "dark red colored vomit." She reports associated epigastric abdominal pain and mid-sternal chest pain that the describes as "burning." She reports decreased appetite and states she has been unable to tolerate PO intake for the last four days. She denies vaginal bleeding or pelvic pain. She has had initial prenatal OB appointment two weeks ago with Dr. Hurtado at [facility], and reports having a normal ultrasound at that time. She also reports being prescribed phenergan and Zofran, which she has taken without relief. She has not taken any medications in the past four days as she has been unable to keep the medications down without vomiting. She denies a history of ulcers. She denies use of alcohol. She has no additional complaints.      The history is provided by the patient.     Review of patient's allergies indicates:   Allergen Reactions    Codeine      History reviewed. No pertinent past medical history.  History reviewed. No pertinent surgical history.  No family history on file.  Social History   Substance Use Topics    Smoking status: Never Smoker    Smokeless tobacco: Not on file    Alcohol use No     Review of Systems   Constitutional: Negative for fever.   HENT: Negative for congestion.    Respiratory: Negative for shortness of breath.    Gastrointestinal: Positive for abdominal pain, nausea and " vomiting (hematemesis).   Genitourinary: Negative for dysuria, pelvic pain and vaginal bleeding.   Musculoskeletal: Negative for back pain.   Skin: Negative for rash.   Neurological: Negative for dizziness, light-headedness and headaches.   Psychiatric/Behavioral: Negative for confusion.       Physical Exam     Initial Vitals [03/30/18 1610]   BP Pulse Resp Temp SpO2   (!) 134/98 102 16 98.5 °F (36.9 °C) 98 %      MAP       110         Physical Exam    Nursing note and vitals reviewed.  Constitutional: She appears well-developed and well-nourished. She is cooperative.  Non-toxic appearance. No distress.   HENT:   Head: Normocephalic and atraumatic.   Eyes: Conjunctivae and EOM are normal. Pupils are equal, round, and reactive to light.   Neck: Normal range of motion and full passive range of motion without pain. Neck supple. No thyromegaly present. No JVD present.   Cardiovascular: Normal rate, regular rhythm, normal heart sounds and normal pulses.   Pulmonary/Chest: Effort normal and breath sounds normal. No respiratory distress.   Abdominal: Soft. Normal appearance and bowel sounds are normal. She exhibits no distension and no mass. There is no tenderness.   Musculoskeletal: Normal range of motion.   Neurological: She is alert and oriented to person, place, and time. She has normal strength. No cranial nerve deficit or sensory deficit.   Skin: Skin is warm, dry and intact. No rash noted.   Psychiatric: She has a normal mood and affect. Her speech is normal and behavior is normal. Judgment and thought content normal.         ED Course   Procedures  Labs Reviewed   URINALYSIS - Abnormal; Notable for the following:        Result Value    Appearance, UA Hazy (*)     Specific Gravity, UA >=1.030 (*)     Protein, UA 2+ (*)     Ketones, UA 3+ (*)     Bilirubin (UA) 1+ (*)     Occult Blood UA Trace (*)     All other components within normal limits   CBC W/ AUTO DIFFERENTIAL - Abnormal; Notable for the following:     MCH  32.2 (*)     All other components within normal limits   COMPREHENSIVE METABOLIC PANEL - Abnormal; Notable for the following:     Sodium 134 (*)     Potassium 3.1 (*)     CO2 19 (*)     Calcium 10.6 (*)     Total Protein 8.7 (*)     Alkaline Phosphatase 49 (*)     All other components within normal limits   URINALYSIS MICROSCOPIC - Abnormal; Notable for the following:     WBC, UA 6 (*)     Bacteria, UA Many (*)     Yeast, UA Occasional (*)     All other components within normal limits   POCT URINE PREGNANCY - Abnormal; Notable for the following:     POC Preg Test, Ur Positive (*)     All other components within normal limits             Medical Decision Making:   Initial Assessment:   Urgent evaluation of 27-year-old female  currently ~9 weeks pregnant here with nausea, vomiting and epigastric pain. Pt has a confirmatory US with her GYN at [facility], currently denies suprapubic pain or vaginal bleeding, but has had persistent nausea, vomiting, and blood streaked emesis despite antiemetics. Here pt appears dehydrated, + epigastric tenderness. Labs with hypokalemia, low bicarb, +ketones in urine, + hemoconcentration and +bacteria in urine. Pt feeling improved  And no longer tachy sp IVF, but will admin additional IVF, antiemetics and dc home with [facility].   Clinical Tests:   Lab Tests: Ordered and Reviewed            Scribe Attestation:   Scribe #1: I performed the above scribed service and the documentation accurately describes the services I performed. I attest to the accuracy of the note.    Attending Attestation:           Physician Attestation for Scribe:  Physician Attestation Statement for Scribe #1: I, Dr. Gomez, reviewed documentation, as scribed by Mihai Najera in my presence, and it is both accurate and complete.                    Clinical Impression:     1. Hyperemesis gravidarum, antepartum    2. Dehydration    3. Nausea and vomiting, intractability of vomiting not specified, unspecified  vomiting type    4. Hypercalcemia         Disposition:   Disposition: Discharged  Condition: Patricia Gomez MD  03/30/18 8712     Yes

## 2022-04-18 ENCOUNTER — HOSPITAL ENCOUNTER (EMERGENCY)
Facility: OTHER | Age: 32
Discharge: HOME | End: 2022-04-18
Attending: EMERGENCY MEDICINE
Payer: MEDICAID

## 2022-04-18 VITALS
DIASTOLIC BLOOD PRESSURE: 71 MMHG | OXYGEN SATURATION: 97 % | WEIGHT: 180 LBS | TEMPERATURE: 97 F | RESPIRATION RATE: 16 BRPM | SYSTOLIC BLOOD PRESSURE: 135 MMHG | HEART RATE: 91 BPM | BODY MASS INDEX: 33.99 KG/M2 | HEIGHT: 61 IN

## 2022-04-18 DIAGNOSIS — S13.4XXA WHIPLASH INJURY TO NECK, INITIAL ENCOUNTER: ICD-10-CM

## 2022-04-18 DIAGNOSIS — S39.012A STRAIN OF LUMBAR REGION, INITIAL ENCOUNTER: ICD-10-CM
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
## 2022-04-18 LAB
B-HCG UR QL: NEGATIVE
CTP QC/QA: YES

## 2022-04-18 PROCEDURE — 25000003 PHARM REV CODE 250: Performed by: NURSE PRACTITIONER

## 2022-04-18 PROCEDURE — 81025 URINE PREGNANCY TEST: CPT | Performed by: NURSE PRACTITIONER

## 2022-04-18 PROCEDURE — 99284 EMERGENCY DEPT VISIT MOD MDM: CPT | Mod: 25

## 2022-04-18 PROCEDURE — 63600175 PHARM REV CODE 636 W HCPCS: Performed by: NURSE PRACTITIONER

## 2022-04-18 PROCEDURE — 96372 THER/PROPH/DIAG INJ SC/IM: CPT | Performed by: NURSE PRACTITIONER

## 2022-04-18 RX ORDER — LIDOCAINE 50 MG/G
1 PATCH TOPICAL DAILY
Qty: 15 PATCH | Refills: 0 | Status: SHIPPED | OUTPATIENT
Start: 2022-04-18

## 2022-04-18 RX ORDER — METHOCARBAMOL 500 MG/1
1000 TABLET, FILM COATED ORAL 3 TIMES DAILY
Qty: 30 TABLET | Refills: 0 | Status: SHIPPED | OUTPATIENT
Start: 2022-04-18 | End: 2022-04-23

## 2022-04-18 RX ORDER — ORPHENADRINE CITRATE 100 MG/1
100 TABLET, EXTENDED RELEASE ORAL
Status: COMPLETED | OUTPATIENT
Start: 2022-04-18 | End: 2022-04-18

## 2022-04-18 RX ORDER — NAPROXEN 375 MG/1
375 TABLET ORAL 2 TIMES DAILY WITH MEALS
Qty: 60 TABLET | Refills: 0 | Status: SHIPPED | OUTPATIENT
Start: 2022-04-18

## 2022-04-18 RX ORDER — KETOROLAC TROMETHAMINE 30 MG/ML
15 INJECTION, SOLUTION INTRAMUSCULAR; INTRAVENOUS
Status: COMPLETED | OUTPATIENT
Start: 2022-04-18 | End: 2022-04-18

## 2022-04-18 RX ORDER — LIDOCAINE 50 MG/G
1 PATCH TOPICAL
Status: DISCONTINUED | OUTPATIENT
Start: 2022-04-18 | End: 2022-04-18 | Stop reason: HOSPADM

## 2022-04-18 RX ADMIN — KETOROLAC TROMETHAMINE 15 MG: 30 INJECTION, SOLUTION INTRAMUSCULAR at 02:04

## 2022-04-18 RX ADMIN — ORPHENADRINE CITRATE 100 MG: 100 TABLET, EXTENDED RELEASE ORAL at 02:04

## 2022-04-18 RX ADMIN — LIDOCAINE 1 PATCH: 50 PATCH CUTANEOUS at 02:04
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
## 2022-04-24 ENCOUNTER — PATIENT MESSAGE (OUTPATIENT)
Dept: OBSTETRICS AND GYNECOLOGY | Facility: CLINIC | Age: 32
End: 2022-04-24
Payer: MEDICAID

## 2022-05-03 ENCOUNTER — OFFICE VISIT (OUTPATIENT)
Dept: OBSTETRICS AND GYNECOLOGY | Facility: CLINIC | Age: 32
End: 2022-05-03
Attending: OBSTETRICS & GYNECOLOGY
Payer: MEDICAID

## 2022-05-03 ENCOUNTER — LAB VISIT (OUTPATIENT)
Dept: LAB | Facility: OTHER | Age: 32
End: 2022-05-03
Attending: OBSTETRICS & GYNECOLOGY
Payer: MEDICAID

## 2022-05-03 VITALS
BODY MASS INDEX: 33.61 KG/M2 | WEIGHT: 178 LBS | HEIGHT: 61 IN | SYSTOLIC BLOOD PRESSURE: 112 MMHG | DIASTOLIC BLOOD PRESSURE: 70 MMHG

## 2022-05-03 DIAGNOSIS — N92.0 MENORRHAGIA WITH REGULAR CYCLE: ICD-10-CM

## 2022-05-03 DIAGNOSIS — Z01.419 WELL WOMAN EXAM WITH ROUTINE GYNECOLOGICAL EXAM: Primary | ICD-10-CM

## 2022-05-03 DIAGNOSIS — N76.0 ACUTE VAGINITIS: ICD-10-CM

## 2022-05-03 LAB
BASOPHILS # BLD AUTO: 0.01 K/UL (ref 0–0.2)
BASOPHILS NFR BLD: 0.2 % (ref 0–1.9)
DIFFERENTIAL METHOD: ABNORMAL
EOSINOPHIL # BLD AUTO: 0 K/UL (ref 0–0.5)
EOSINOPHIL NFR BLD: 0.4 % (ref 0–8)
ERYTHROCYTE [DISTWIDTH] IN BLOOD BY AUTOMATED COUNT: 13.2 % (ref 11.5–14.5)
HCT VFR BLD AUTO: 41.8 % (ref 37–48.5)
HGB BLD-MCNC: 14 G/DL (ref 12–16)
IMM GRANULOCYTES # BLD AUTO: 0 K/UL (ref 0–0.04)
IMM GRANULOCYTES NFR BLD AUTO: 0 % (ref 0–0.5)
LYMPHOCYTES # BLD AUTO: 2.1 K/UL (ref 1–4.8)
LYMPHOCYTES NFR BLD: 45.5 % (ref 18–48)
MCH RBC QN AUTO: 32.6 PG (ref 27–31)
MCHC RBC AUTO-ENTMCNC: 33.5 G/DL (ref 32–36)
MCV RBC AUTO: 97 FL (ref 82–98)
MONOCYTES # BLD AUTO: 0.3 K/UL (ref 0.3–1)
MONOCYTES NFR BLD: 6.3 % (ref 4–15)
NEUTROPHILS # BLD AUTO: 2.2 K/UL (ref 1.8–7.7)
NEUTROPHILS NFR BLD: 47.6 % (ref 38–73)
NRBC BLD-RTO: 0 /100 WBC
PLATELET # BLD AUTO: 214 K/UL (ref 150–450)
PMV BLD AUTO: 10.6 FL (ref 9.2–12.9)
RBC # BLD AUTO: 4.3 M/UL (ref 4–5.4)
TSH SERPL DL<=0.005 MIU/L-ACNC: 2.04 UIU/ML (ref 0.4–4)
WBC # BLD AUTO: 4.62 K/UL (ref 3.9–12.7)

## 2022-05-03 PROCEDURE — 1160F RVW MEDS BY RX/DR IN RCRD: CPT | Mod: CPTII,,, | Performed by: OBSTETRICS & GYNECOLOGY

## 2022-05-03 PROCEDURE — 99213 OFFICE O/P EST LOW 20 MIN: CPT | Mod: PBBFAC,PN | Performed by: OBSTETRICS & GYNECOLOGY

## 2022-05-03 PROCEDURE — 87491 CHLMYD TRACH DNA AMP PROBE: CPT | Performed by: OBSTETRICS & GYNECOLOGY

## 2022-05-03 PROCEDURE — 99999 PR PBB SHADOW E&M-EST. PATIENT-LVL III: CPT | Mod: PBBFAC,,, | Performed by: OBSTETRICS & GYNECOLOGY

## 2022-05-03 PROCEDURE — 1160F PR REVIEW ALL MEDS BY PRESCRIBER/CLIN PHARMACIST DOCUMENTED: ICD-10-PCS | Mod: CPTII,,, | Performed by: OBSTETRICS & GYNECOLOGY

## 2022-05-03 PROCEDURE — 3078F PR MOST RECENT DIASTOLIC BLOOD PRESSURE < 80 MM HG: ICD-10-PCS | Mod: CPTII,,, | Performed by: OBSTETRICS & GYNECOLOGY

## 2022-05-03 PROCEDURE — 1159F MED LIST DOCD IN RCRD: CPT | Mod: CPTII,,, | Performed by: OBSTETRICS & GYNECOLOGY

## 2022-05-03 PROCEDURE — 99395 PR PREVENTIVE VISIT,EST,18-39: ICD-10-PCS | Mod: S$PBB,,, | Performed by: OBSTETRICS & GYNECOLOGY

## 2022-05-03 PROCEDURE — 87481 CANDIDA DNA AMP PROBE: CPT | Mod: 59 | Performed by: OBSTETRICS & GYNECOLOGY

## 2022-05-03 PROCEDURE — 87624 HPV HI-RISK TYP POOLED RSLT: CPT | Performed by: OBSTETRICS & GYNECOLOGY

## 2022-05-03 PROCEDURE — 3074F SYST BP LT 130 MM HG: CPT | Mod: CPTII,,, | Performed by: OBSTETRICS & GYNECOLOGY

## 2022-05-03 PROCEDURE — 3078F DIAST BP <80 MM HG: CPT | Mod: CPTII,,, | Performed by: OBSTETRICS & GYNECOLOGY

## 2022-05-03 PROCEDURE — 85025 COMPLETE CBC W/AUTO DIFF WBC: CPT | Performed by: OBSTETRICS & GYNECOLOGY

## 2022-05-03 PROCEDURE — 3008F PR BODY MASS INDEX (BMI) DOCUMENTED: ICD-10-PCS | Mod: CPTII,,, | Performed by: OBSTETRICS & GYNECOLOGY

## 2022-05-03 PROCEDURE — 88175 CYTOPATH C/V AUTO FLUID REDO: CPT | Performed by: OBSTETRICS & GYNECOLOGY

## 2022-05-03 PROCEDURE — 99395 PREV VISIT EST AGE 18-39: CPT | Mod: S$PBB,,, | Performed by: OBSTETRICS & GYNECOLOGY

## 2022-05-03 PROCEDURE — 3008F BODY MASS INDEX DOCD: CPT | Mod: CPTII,,, | Performed by: OBSTETRICS & GYNECOLOGY

## 2022-05-03 PROCEDURE — 84443 ASSAY THYROID STIM HORMONE: CPT | Performed by: OBSTETRICS & GYNECOLOGY

## 2022-05-03 PROCEDURE — 36415 COLL VENOUS BLD VENIPUNCTURE: CPT | Performed by: OBSTETRICS & GYNECOLOGY

## 2022-05-03 PROCEDURE — 87591 N.GONORRHOEAE DNA AMP PROB: CPT | Performed by: OBSTETRICS & GYNECOLOGY

## 2022-05-03 PROCEDURE — 87801 DETECT AGNT MULT DNA AMPLI: CPT | Performed by: OBSTETRICS & GYNECOLOGY

## 2022-05-03 PROCEDURE — 99999 PR PBB SHADOW E&M-EST. PATIENT-LVL III: ICD-10-PCS | Mod: PBBFAC,,, | Performed by: OBSTETRICS & GYNECOLOGY

## 2022-05-03 PROCEDURE — 3074F PR MOST RECENT SYSTOLIC BLOOD PRESSURE < 130 MM HG: ICD-10-PCS | Mod: CPTII,,, | Performed by: OBSTETRICS & GYNECOLOGY

## 2022-05-03 PROCEDURE — 1159F PR MEDICATION LIST DOCUMENTED IN MEDICAL RECORD: ICD-10-PCS | Mod: CPTII,,, | Performed by: OBSTETRICS & GYNECOLOGY

## 2022-05-03 NOTE — PROGRESS NOTES
"SUBJECTIVE:   31 y.o. female   for annual routine Pap and checkup. Patient's last menstrual period was 2022..  She complains of vaginal discharge and itching for 1 week.  Pt reports discharge is white and foul smelling, suspects possible BV infection. Has slight irritation on urination. Pt has Mirena IUD and is sexually active, uses condoms from "time to time", has STI hx of trichomonas. Pt has heavy period on the 1st of every month, lasting 3-4 days. Uses 10 regular pads during day and 5 pads overnight. Period is accompanied by painful cramps for which pt uses ibuprofen and heating pad. Pt reports occasional nipple tenderness that self resolves.       History reviewed. No pertinent past medical history.  Past Surgical History:   Procedure Laterality Date     SECTION N/A 10/1/2018    Procedure:  SECTION;  Surgeon: Denver Ayala MD;  [facility];  Service: OB/GYN;  Laterality: N/A;     SECTION  10/01/2018     Social History     Socioeconomic History    Marital status: Single   Tobacco Use    Smoking status: Former Smoker     Types: Cigarettes    Smokeless tobacco: Never Used   Substance and Sexual Activity    Alcohol use: No    Drug use: Yes     Types: Marijuana    Sexual activity: Yes     Partners: Male     Birth control/protection: I.U.D.     Family History   Problem Relation Age of Onset    Colon cancer Neg Hx     Ovarian cancer Neg Hx      OB History    Para Term  AB Living   1 1 1     1   SAB IAB Ectopic Multiple Live Births         0 1      # Outcome Date GA Lbr Lan/2nd Weight Sex Delivery Anes PTL Lv   1 Term 10/01/18 37w1d  1.83 kg (4 lb 0.6 oz) F CS-LTranv Spinal, EPI N LANETTE      Complications: Fetal Intolerance         Current Outpatient Medications   Medication Sig Dispense Refill    LIDOcaine (LIDODERM) 5 % Place 1 patch onto the skin once daily. Remove & Discard patch within 12 hours or as directed by MD 15 patch 0    naproxen " "(NAPROSYN) 375 MG tablet Take 1 tablet (375 mg total) by mouth 2 (two) times daily with meals. 60 tablet 0     Current Facility-Administered Medications   Medication Dose Route Frequency Provider Last Rate Last Admin    levonorgestrel 20 mcg/24 hr (5 years) IUD 1 Intra Uterine Device  1 each Intrauterine 1 time in Clinic/HOD Adeline Boothe MD         Allergies: Codeine     ROS:  Constitutional: no weight loss, weight gain, fever, fatigue  Eyes:  No vision changes, glasses/contacts  ENT/Mouth: No ulcers, sinus problems, ears ringing, headache  Cardiovascular: No inability to lie flat, chest pain, exercise intolerance, swelling, heart palpitations  Respiratory: No wheezing, coughing blood, shortness of breath, or cough  Gastrointestinal: No diarrhea, bloody stool, nausea/vomiting, constipation, gas, hemorrhoids  Genitourinary: No blood in urine, painful urination, urgency of urination, frequency of urination, incomplete emptying, incontinence, abnormal bleeding, painful periods, heavy periods, vaginal discharge, vaginal odor, painful intercourse, sexual problems, bleeding after intercourse.  Musculoskeletal: No muscle weakness  Skin/Breast: No painful breasts, nipple discharge, masses, rash, ulcers  Neurological: No passing out, seizures, numbness, headache  Endocrine: No diabetes, hypothyroid, hyperthyroid, hot flashes, hair loss, abnormal hair growth, ance  Psychiatric: No depression, crying  Hematologic: No bruises, bleeding, swollen lymph nodes, anemia.      OBJECTIVE:   The patient appears well, alert, oriented x 3, in no distress.  /70   Ht 5' 1" (1.549 m)   Wt 80.7 kg (178 lb)   LMP 04/05/2022   BMI 33.63 kg/m²   NECK: no thyromegaly, trachea midline  SKIN: no acne, striae, hirsutism  BREAST EXAM: breasts appear normal, no suspicious masses, no skin or nipple changes or axillary nodes  ABDOMEN: soft, non-tender; bowel sounds normal; no masses,  no organomegaly and no hernias, masses, or " hepatosplenomegaly  GENITALIA: vaginal discharge noted, normal vagina and normal vaginal tone, normal cervix, normal uterus, size and consistency and normal adnexa without tenderness  URETHRA: normal urethra, normal urethral meatus  VAGINA: Normal  CERVIX: no lesions or cervical motion tenderness  UTERUS: not examined  ADNEXA: normal adnexa    ASSESSMENT:   .Daija was seen today for well woman.    Diagnoses and all orders for this visit:    Well woman exam with routine gynecological exam  -     Liquid-Based Pap Smear, Screening  -     HPV High Risk Genotypes, PCR    Acute vaginitis  -     Vaginosis Screen by DNA Probe    Menorrhagia with regular cycle  -     CBC Auto Differential; Future  -     TSH; Future  -     US Pelvis Comp with Transvag NON-OB (xpd); Future    Will follow up labs and ultrasound.  If normal would offer TXA

## 2022-05-06 ENCOUNTER — PATIENT MESSAGE (OUTPATIENT)
Dept: OBSTETRICS AND GYNECOLOGY | Facility: CLINIC | Age: 32
End: 2022-05-06
Payer: MEDICAID

## 2022-05-06 LAB
C TRACH DNA SPEC QL NAA+PROBE: NOT DETECTED
N GONORRHOEA DNA SPEC QL NAA+PROBE: NOT DETECTED

## 2022-05-08 LAB
BACTERIAL VAGINOSIS DNA: POSITIVE
CANDIDA GLABRATA DNA: NEGATIVE
CANDIDA KRUSEI DNA: NEGATIVE
CANDIDA RRNA VAG QL PROBE: NEGATIVE
T VAGINALIS RRNA GENITAL QL PROBE: POSITIVE

## 2022-05-10 ENCOUNTER — TELEPHONE (OUTPATIENT)
Dept: OBSTETRICS AND GYNECOLOGY | Facility: CLINIC | Age: 32
End: 2022-05-10
Payer: MEDICAID

## 2022-05-11 RX ORDER — METRONIDAZOLE 500 MG/1
500 TABLET ORAL EVERY 12 HOURS
Qty: 14 TABLET | Refills: 1 | Status: SHIPPED | OUTPATIENT
Start: 2022-05-11 | End: 2022-05-18

## 2022-05-11 NOTE — TELEPHONE ENCOUNTER
Notified of +trich and BV on affirm.  Rx faxed to pharmacy.  Counseled that her partner needs to be treated as well.  Patient verbalizes understanding. Please schedule repeat test in 3 months.

## 2022-05-12 LAB
CLINICAL INFO: NORMAL
CYTO CVX: NORMAL
CYTOLOGIST CVX/VAG CYTO: NORMAL
CYTOLOGIST CVX/VAG CYTO: NORMAL
CYTOLOGY CMNT CVX/VAG CYTO-IMP: NORMAL
CYTOLOGY PAP THIN PREP EXPLANATION: NORMAL
DATE OF PREVIOUS PAP: NO
DATE PREVIOUS BX: NO
GEN CATEG CVX/VAG CYTO-IMP: NORMAL
HPV I/H RISK 4 DNA CVX QL NAA+PROBE: NOT DETECTED
LMP START DATE: NORMAL
MICROORGANISM CVX/VAG CYTO: NORMAL
PATHOLOGIST CVX/VAG CYTO: NORMAL
SERVICE CMNT-IMP: NORMAL
SPECIMEN SOURCE CVX/VAG CYTO: NORMAL
STAT OF ADQ CVX/VAG CYTO-IMP: NORMAL

## 2022-06-03 ENCOUNTER — PATIENT MESSAGE (OUTPATIENT)
Dept: OBSTETRICS AND GYNECOLOGY | Facility: CLINIC | Age: 32
End: 2022-06-03
Payer: MEDICAID

## 2022-08-11 ENCOUNTER — TELEPHONE (OUTPATIENT)
Dept: OBSTETRICS AND GYNECOLOGY | Facility: CLINIC | Age: 32
End: 2022-08-11
Payer: MEDICAID

## 2022-08-12 NOTE — TELEPHONE ENCOUNTER
----- Message from Lupillo Walsh LPN sent at 8/11/2022  4:24 PM CDT -----  Missed appointment for tejas.  Please reschedule

## 2023-07-10 ENCOUNTER — HOSPITAL ENCOUNTER (EMERGENCY)
Facility: OTHER | Age: 33
Discharge: HOME | End: 2023-07-10
Attending: EMERGENCY MEDICINE | Admitting: PHYSICIAN ASSISTANT
Payer: MEDICAID

## 2023-07-10 VITALS
RESPIRATION RATE: 20 BRPM | OXYGEN SATURATION: 100 % | TEMPERATURE: 98 F | SYSTOLIC BLOOD PRESSURE: 143 MMHG | DIASTOLIC BLOOD PRESSURE: 80 MMHG | HEART RATE: 83 BPM

## 2023-07-10 DIAGNOSIS — R55 SYNCOPE: Primary | ICD-10-CM

## 2023-07-10 LAB
ANION GAP SERPL CALC-SCNC: 10 MMOL/L (ref 8–16)
B-HCG UR QL: NEGATIVE
BASOPHILS # BLD AUTO: 0.02 K/UL (ref 0–0.2)
BASOPHILS NFR BLD: 0.4 % (ref 0–1.9)
BUN SERPL-MCNC: 13 MG/DL (ref 6–20)
CALCIUM SERPL-MCNC: 9.6 MG/DL (ref 8.7–10.5)
CHLORIDE SERPL-SCNC: 107 MMOL/L (ref 95–110)
CO2 SERPL-SCNC: 20 MMOL/L (ref 23–29)
CREAT SERPL-MCNC: 0.8 MG/DL (ref 0.5–1.4)
CTP QC/QA: YES
CTP QC/QA: YES
DIFFERENTIAL METHOD: ABNORMAL
EOSINOPHIL # BLD AUTO: 0 K/UL (ref 0–0.5)
EOSINOPHIL NFR BLD: 0.4 % (ref 0–8)
ERYTHROCYTE [DISTWIDTH] IN BLOOD BY AUTOMATED COUNT: 13.4 % (ref 11.5–14.5)
EST. GFR  (NO RACE VARIABLE): >60 ML/MIN/1.73 M^2
GLUCOSE SERPL-MCNC: 96 MG/DL (ref 70–110)
HCT VFR BLD AUTO: 39.4 % (ref 37–48.5)
HCV AB SERPL QL IA: NEGATIVE
HGB BLD-MCNC: 13.3 G/DL (ref 12–16)
HIV 1+2 AB+HIV1 P24 AG SERPL QL IA: NEGATIVE
IMM GRANULOCYTES # BLD AUTO: 0.02 K/UL (ref 0–0.04)
IMM GRANULOCYTES NFR BLD AUTO: 0.4 % (ref 0–0.5)
LYMPHOCYTES # BLD AUTO: 1.8 K/UL (ref 1–4.8)
LYMPHOCYTES NFR BLD: 31.1 % (ref 18–48)
MCH RBC QN AUTO: 31.7 PG (ref 27–31)
MCHC RBC AUTO-ENTMCNC: 33.8 G/DL (ref 32–36)
MCV RBC AUTO: 94 FL (ref 82–98)
MONOCYTES # BLD AUTO: 0.4 K/UL (ref 0.3–1)
MONOCYTES NFR BLD: 7.2 % (ref 4–15)
NEUTROPHILS # BLD AUTO: 3.4 K/UL (ref 1.8–7.7)
NEUTROPHILS NFR BLD: 60.5 % (ref 38–73)
NRBC BLD-RTO: 0 /100 WBC
PLATELET # BLD AUTO: 206 K/UL (ref 150–450)
PMV BLD AUTO: 10.8 FL (ref 9.2–12.9)
POCT GLUCOSE: 96 MG/DL (ref 70–110)
POTASSIUM SERPL-SCNC: 4.2 MMOL/L (ref 3.5–5.1)
RBC # BLD AUTO: 4.19 M/UL (ref 4–5.4)
SARS-COV-2 RDRP RESP QL NAA+PROBE: NEGATIVE
SODIUM SERPL-SCNC: 137 MMOL/L (ref 136–145)
WBC # BLD AUTO: 5.66 K/UL (ref 3.9–12.7)

## 2023-07-10 PROCEDURE — 93010 EKG 12-LEAD: ICD-10-PCS | Mod: ,,, | Performed by: INTERNAL MEDICINE

## 2023-07-10 PROCEDURE — 93005 ELECTROCARDIOGRAM TRACING: CPT

## 2023-07-10 PROCEDURE — 82962 GLUCOSE BLOOD TEST: CPT

## 2023-07-10 PROCEDURE — 85025 COMPLETE CBC W/AUTO DIFF WBC: CPT | Performed by: PHYSICIAN ASSISTANT

## 2023-07-10 PROCEDURE — 87389 HIV-1 AG W/HIV-1&-2 AB AG IA: CPT | Performed by: PHYSICIAN ASSISTANT

## 2023-07-10 PROCEDURE — 25000003 PHARM REV CODE 250: Performed by: PHYSICIAN ASSISTANT

## 2023-07-10 PROCEDURE — 86803 HEPATITIS C AB TEST: CPT | Performed by: PHYSICIAN ASSISTANT

## 2023-07-10 PROCEDURE — 87635 SARS-COV-2 COVID-19 AMP PRB: CPT | Performed by: PHYSICIAN ASSISTANT

## 2023-07-10 PROCEDURE — 93010 ELECTROCARDIOGRAM REPORT: CPT | Mod: ,,, | Performed by: INTERNAL MEDICINE

## 2023-07-10 PROCEDURE — 99284 EMERGENCY DEPT VISIT MOD MDM: CPT

## 2023-07-10 PROCEDURE — 80048 BASIC METABOLIC PNL TOTAL CA: CPT | Performed by: PHYSICIAN ASSISTANT

## 2023-07-10 PROCEDURE — 81025 URINE PREGNANCY TEST: CPT | Performed by: PHYSICIAN ASSISTANT

## 2023-07-10 RX ORDER — ACETAMINOPHEN 500 MG
1000 TABLET ORAL
Status: COMPLETED | OUTPATIENT
Start: 2023-07-10 | End: 2023-07-10

## 2023-07-10 RX ADMIN — SODIUM CHLORIDE 1000 ML: 9 INJECTION, SOLUTION INTRAVENOUS at 10:07

## 2023-07-10 RX ADMIN — ACETAMINOPHEN 1000 MG: 500 TABLET ORAL at 09:07

## 2023-07-10 NOTE — ED TRIAGE NOTES
Pt states that she became overheated and passed out at work. States she lost consciousness for a short period of time but is unsure how. States that she now has HA and belly pain but otherwise back to normal. States she hasn't eaten anything today. Denies CP or any lightheadedness or dizziness. Alert, oriented x 4, in no distress on exam. No focal neuro findings.

## 2023-07-10 NOTE — Clinical Note
"Daija "Daijatawana Purvis was seen and treated in our emergency department on 7/10/2023.  She may return to work on 07/11/2023.       If you have any questions or concerns, please don't hesitate to call.      Chip Lazar PA-C"

## 2023-07-10 NOTE — ED PROVIDER NOTES
Source of History:  Patient      Chief complaint:  Dizziness (Pt activated EMS for sudden onset of Dizziness and lightheadedness while working in [employer], no LOC)      HPI:  Daija Purvis is a 32 y.o. female who is otherwise healthy, presenting to emergency department by EMS for syncopal episode.  Patient states that she experienced stomach cramping and felt lightheaded and overheated while at work.  She works at [employer] and with standing up.  She states she passed out and hit the front of her head.  She reports very brief episode of syncope.  She is reporting a frontal headache.  She denies vision changes, nausea, vomiting.  She denies chest pain or shortness of breath.  She did not eat breakfast this morning.  She denies excessive heat exposure.    ROS: As per HPI    Review of patient's allergies indicates:   Allergen Reactions    Codeine        PMH:  As per HPI and below:  No past medical history on file.  Past Surgical History:   Procedure Laterality Date     SECTION N/A 10/1/2018    Procedure:  SECTION;  Surgeon: Denver Ayala MD;  Location: [facility] L&D;  Service: OB/GYN;  Laterality: N/A;     SECTION  10/01/2018       Social History     Tobacco Use    Smoking status: Former     Types: Cigarettes    Smokeless tobacco: Never   Substance Use Topics    Alcohol use: No    Drug use: Yes     Types: Marijuana       Physical Exam:    /80 (BP Location: Left arm, Patient Position: Lying)   Pulse 82   Temp 98.2 °F (36.8 °C) (Oral)   Resp 17   SpO2 97%   Nursing note and vital signs reviewed.  Appearance: No acute distress.  Eyes: No conjunctival injection.  PERRLA.  Normal EOM.  Photophobia bilaterally.  HENT: Oropharynx clear and moist.  Atraumatic.  Chest/ Respiratory: Clear to auscultation bilaterally.  Good air movement.  No wheezes.  No rhonchi. No rales. No accessory muscle use.  Cardiovascular: Regular rate and rhythm.  No murmurs. No gallops. No rubs.  Abdomen:  Soft.  Not distended.  Nontender.  No guarding.  No rebound. Non-peritoneal.  Musculoskeletal: Good range of motion all joints.  No deformities.  Neck supple.  No meningismus.  Skin: No rashes seen.  Good turgor.  No abrasions.  No ecchymoses.  Neurologic: Motor intact.  Sensation intact.  Cerebellar intact.  Cranial nerves intact.  Negative pronator drift.  Mental Status:  Alert and oriented x 3.  Appropriate, conversant.   Labs that have been ordered have been independently reviewed and interpreted by myself.    I decided to obtain the patient's medical records.      MDM/ Differential Dx:    32 y.o. female who is otherwise healthy, presenting to emergency department by EMS after a syncopal event at work.  She states this was preceded by stomach cramping and diaphoresis.  She is afebrile, nontoxic appearing hemodynamically stable.    Differential diagnosis includes vasovagal event, orthostasis, metabolic derangement, dehydration, cardiac arrhythmia.    Will obtain blood work, give IV fluids and reassess    ED Course as of 07/10/23 1144   Mon Jul 10, 2023   1042 EKG from 9:45 a.m.  Normal sinus rhythm at a rate of 71 beats per minute.  No STEMI.  No ectopy.  No change from prior EKG from September 2018. [AG]   1144 Upon reassessment, patient feeling much better.  She appears to be feeling better as well.  Blood work without gross abnormalities.  Suspect likely vasovagal event with dehydration.  Patient advised on supportive care, strict return precautions to the ED. [AG]      ED Course User Index  [AG] Chip Lazar PA-C           Diagnostic Impression:    1. Syncope                   Chip Lazar PA-C  07/10/23 1145

## 2023-07-11 NOTE — PROGRESS NOTES
"[facility]  Obstetrics  Antepartum Progress Note    Patient Name: Daija Purvis  MRN: [id number]  Admission Date: 2018  Hospital Length of Stay: 1 days  Attending Physician: Sowmya Arndt DO  Primary Care Provider: Lizet Abreu MD    Subjective:     Principal Problem:Hyperemesis gravidarum    HPI:  Daija Purvis is a 27 y.o.  at approximately 10 weeks GA. Patient gives a h/o LMP 18, but says she was given an AUREA of 18 at her first prenatal visit. She reports constant nausea and vomiting for the past three weeks, and has been unable to keep food or liquid down for the past 2 weeks. She reports significant weight loss, per patient, "like 30 pounds." She also says for the past 3 days she passes out at least once a day - when walking around her house, vision goes black and she loses consciousness. She also has decreased appetite and infrequent bowel movements for the last several weeks. She was seen here last week for n/v, given phenergan/zofran suppositories. She reports no improvement. She also had one small (dime-sized) clot passed while she was urinating yesterday, but no bleeding prior or since.   She saw an OB at [facility] but has not yet had first trimester screenings and would like to transfer care to [facility].   She was diagnosed with UTI at first prenatal visit but has been unable to tolerate the medication. She denies dysuria currently.    Hospital Course:  2018 - Admitted for antiemetics and electrolyte management for hyperemesis gravidarum. Reported UTI that was not treated prior to admission. Found to have urine + for trichomonas. Given rocephin and flagyl. No N/V overnight with scheduled B6/doxylamine. Tolerated clears.  2018 - Reports 3 episodes of vomiting this AM. Reports improvement of symptoms with reglan, changed to scheduled TID. Phenergan 12.5mg added on QID. 4 more episodes throughout the day, last episode before " bed.  04/08/2018 - Slept well, no episodes of emesis overnight. Reports continued nausea but improvement in symptoms.  04/09/2018- Reports improvement of nausea last night with thorazine. Was able to tolerate salad last night however vomited part of salad early this AM. Reports slight nausea and slight hunger this AM. Is tolerating small amounts of fluids.     Interval Hx:    Reports improvement of nausea last night with thorazine. Was able to tolerate salad last night however vomited part of salad early this AM. Reports slight nausea and slight hunger this AM. Is tolerating small amounts of fluids.  Also reports constipation and congestion.   Denies contractions, VB.     Objective:     Vital Signs (Most Recent):  Temp: 98.3 °F (36.8 °C) (04/09/18 0521)  Pulse: 85 (04/09/18 0521)  Resp: 20 (04/09/18 0521)  BP: 119/64 (04/09/18 0521)  SpO2: 100 % (04/09/18 0521) Vital Signs (24h Range):  Temp:  [97.8 °F (36.6 °C)-99.6 °F (37.6 °C)] 98.3 °F (36.8 °C)  Pulse:  [] 85  Resp:  [18-20] 20  SpO2:  [91 %-100 %] 100 %  BP: (105-121)/(59-71) 119/64     Weight: 62.9 kg (138 lb 10.7 oz)  Body mass index is 26.2 kg/m².      Intake/Output Summary (Last 24 hours) at 04/09/18 0631  Last data filed at 04/09/18 0600   Gross per 24 hour   Intake              120 ml   Output                0 ml   Net              120 ml     Significant Labs:    Recent Labs  Lab 04/06/18  0846  04/07/18 0511 04/07/18 2022 04/09/18 0525   *  < > 139 140 136   K 3.8  < > 3.5 3.3* 2.8*   CL 98  < > 115* 110 106   CO2 16*  < > 20* 22* 25   BUN 22*  < > 7 3* 7   CREATININE 0.9  < > 0.6 0.6 0.5   GLU 96  < > 101 90 94   MG 2.2  --   --   --  1.1*   PHOS 3.7  --   --   --  2.2*   < > = values in this interval not displayed.  T&S A POS    GC/CT neg    UCx multiple organisms, none in predominance   GC/CT negative    Physical Exam:   Constitutional: She is oriented to person, place, and time. She appears well-developed and well-nourished. No  distress.    HENT:   Head: Normocephalic and atraumatic.    Eyes: EOM are normal.    Neck: Normal range of motion.    Cardiovascular: Regular rhythm.  Exam reveals no gallop.     Pulmonary/Chest: Effort normal and breath sounds normal. No respiratory distress.        Abdominal: Soft. She exhibits no distension. There is no tenderness.             Musculoskeletal: Normal range of motion and moves all extremeties. She exhibits no edema or tenderness.       Neurological: She is alert and oriented to person, place, and time.    Skin: Skin is warm and dry.    Psychiatric: She has a normal mood and affect. Her behavior is normal.       Assessment/Plan:     27 y.o. female  at Unknown for:    * Hyperemesis gravidarum    - PO doxylamine and IV B6 scheduled q6  - Continue Reglan TID  - S/p phenergan  - Thorazine 25mg q6 hrs  - pepcid BID  - D5 NS w/ 20mEq KCl @ 125mL, s/p 2L D5 NS  - Replace electrolytes PRN  - symptoms slowly improving        Vaginal bleeding affecting early pregnancy    - Dime-sized blood clot x 1 yesterday  - no further bleeding  - T&S A POS -rhogam not indicated  - continue to monitor for further bleeding        Trichimoniasis    - On urine in ED, discussed with patient including need for partner treatment  - s/p Flagyl IV 2g  - GC/CT negative  - HIV, RPR, Hep panel pending        First trimester pregnancy    - FHT verified on admission  - reports ~10 weeks by 7 week US at [facility], however AUREA puts her at 8.4 weeks  - Dating U/S today              Jessica Herrera MD  Obstetrics  [facility]             Drysol Counseling:  I discussed with the patient the risks of drysol/aluminum chloride including but not limited to skin rash, itching, irritation, burning.

## 2025-03-18 ENCOUNTER — OFFICE VISIT (OUTPATIENT)
Dept: OBSTETRICS AND GYNECOLOGY | Facility: CLINIC | Age: 35
End: 2025-03-18
Attending: OBSTETRICS & GYNECOLOGY
Payer: MEDICAID

## 2025-03-18 VITALS
HEIGHT: 61 IN | DIASTOLIC BLOOD PRESSURE: 78 MMHG | SYSTOLIC BLOOD PRESSURE: 130 MMHG | BODY MASS INDEX: 34.13 KG/M2 | WEIGHT: 180.75 LBS

## 2025-03-18 DIAGNOSIS — Z30.430 ENCOUNTER FOR IUD INSERTION: ICD-10-CM

## 2025-03-18 DIAGNOSIS — N76.0 ACUTE VAGINITIS: ICD-10-CM

## 2025-03-18 DIAGNOSIS — N94.6 DYSMENORRHEA: ICD-10-CM

## 2025-03-18 DIAGNOSIS — Z01.419 WELL WOMAN EXAM WITH ROUTINE GYNECOLOGICAL EXAM: Primary | ICD-10-CM

## 2025-03-18 PROCEDURE — 81515 NFCT DS BV&VAGINITIS DNA ALG: CPT | Performed by: OBSTETRICS & GYNECOLOGY

## 2025-03-18 PROCEDURE — 99213 OFFICE O/P EST LOW 20 MIN: CPT | Mod: PBBFAC | Performed by: OBSTETRICS & GYNECOLOGY

## 2025-03-18 PROCEDURE — 99999 PR PBB SHADOW E&M-EST. PATIENT-LVL III: CPT | Mod: PBBFAC,,, | Performed by: OBSTETRICS & GYNECOLOGY

## 2025-03-18 RX ORDER — IBUPROFEN 800 MG/1
800 TABLET ORAL EVERY 8 HOURS PRN
Qty: 60 TABLET | Refills: 2 | Status: SHIPPED | OUTPATIENT
Start: 2025-03-18 | End: 2026-03-18

## 2025-03-18 NOTE — PROGRESS NOTES
SUBJECTIVE:   34 y.o. female   for annual routine Pap and checkup. No LMP recorded (lmp unknown)..  She complains of dysmenorrhea.  She has a mirena that is due to be replaced next year.  She would like an earlier removal and insertion due to resumption of her cycles and cramps.  She complains of vaginal odor for 2 weeks..        History reviewed. No pertinent past medical history.  Past Surgical History:   Procedure Laterality Date     SECTION N/A 10/1/2018    Procedure:  SECTION;  Surgeon: Denver Ayala MD;  Location: [facility] L&D;  Service: OB/GYN;  Laterality: N/A;     SECTION  10/01/2018     Social History[1]  Family History   Problem Relation Name Age of Onset    Colon cancer Neg Hx      Ovarian cancer Neg Hx       OB History    Para Term  AB Living   1 1 1   1   SAB IAB Ectopic Multiple Live Births      0 1      # Outcome Date GA Lbr Lan/2nd Weight Sex Type Anes PTL Lv   1 Term 10/01/18 37w1d  1.83 kg (4 lb 0.6 oz) F CS-LTranv Spinal, EPI N LANETTE      Complications: Fetal Intolerance         Current Medications[2]  Allergies: Codeine     ROS:  Constitutional: no weight loss, weight gain, fever, fatigue  Eyes:  No vision changes, glasses/contacts  ENT/Mouth: No ulcers, sinus problems, ears ringing, headache  Cardiovascular: No inability to lie flat, chest pain, exercise intolerance, swelling, heart palpitations  Respiratory: No wheezing, coughing blood, shortness of breath, or cough  Gastrointestinal: No diarrhea, bloody stool, nausea/vomiting, constipation, gas, hemorrhoids  Genitourinary: No blood in urine, painful urination, urgency of urination, frequency of urination, incomplete emptying, incontinence, abnormal bleeding, painful periods, heavy periods, vaginal discharge, vaginal odor, painful intercourse, sexual problems, bleeding after intercourse.  Musculoskeletal: No muscle weakness  Skin/Breast: No painful breasts, nipple discharge, masses, rash,  "ulcers  Neurological: No passing out, seizures, numbness, headache  Endocrine: No diabetes, hypothyroid, hyperthyroid, hot flashes, hair loss, abnormal hair growth, ance  Psychiatric: No depression, crying  Hematologic: No bruises, bleeding, swollen lymph nodes, anemia.      OBJECTIVE:   The patient appears well, alert, oriented x 3, in no distress.  /78   Ht 5' 1" (1.549 m)   Wt 82 kg (180 lb 12.4 oz)   LMP  (LMP Unknown)   BMI 34.16 kg/m²   NECK: no thyromegaly, trachea midline  SKIN: no acne, striae, hirsutism  BREAST EXAM: breasts appear normal, no suspicious masses, no skin or nipple changes or axillary nodes  ABDOMEN: no hernias, masses, or hepatosplenomegaly  GENITALIA: normal external genitalia, no erythema, no discharge  URETHRA: normal urethra, normal urethral meatus  VAGINA: vaginal discharge scant and white  CERVIX: no lesions or cervical motion tenderness and IUD string visible  UTERUS: normal size, contour, position, consistency, mobility, non-tender  ADNEXA: normal adnexa and no mass, fullness, tenderness    \  ASSESSMENT:   .Daija was seen today for vaginal odor.    Diagnoses and all orders for this visit:    Well woman exam with routine gynecological exam    Acute vaginitis  -     Vaginosis Screen by DNA Probe    Encounter for IUD insertion  -     Device Authorization Order    Dysmenorrhea  -     ibuprofen (ADVIL,MOTRIN) 800 MG tablet; Take 1 tablet (800 mg total) by mouth every 8 (eight) hours as needed for Pain.      Will return for IUD swap       [1]   Social History  Socioeconomic History    Marital status: Single   Tobacco Use    Smoking status: Former     Types: Cigarettes    Smokeless tobacco: Never   Substance and Sexual Activity    Alcohol use: No    Drug use: Yes     Types: Marijuana    Sexual activity: Yes     Partners: Male     Birth control/protection: I.U.D.   [2]   Current Outpatient Medications   Medication Sig Dispense Refill    LIDOcaine (LIDODERM) 5 % Place 1 patch " onto the skin once daily. Remove & Discard patch within 12 hours or as directed by MD 15 patch 0    ibuprofen (ADVIL,MOTRIN) 800 MG tablet Take 1 tablet (800 mg total) by mouth every 8 (eight) hours as needed for Pain. 60 tablet 2     Current Facility-Administered Medications   Medication Dose Route Frequency Provider Last Rate Last Admin    levonorgestrel 20 mcg/24 hr (5 years) IUD 1 Intra Uterine Device  1 each Intrauterine 1 time in Clinic/HOD Adeline Boothe MD

## 2025-03-19 ENCOUNTER — PATIENT MESSAGE (OUTPATIENT)
Dept: OBSTETRICS AND GYNECOLOGY | Facility: CLINIC | Age: 35
End: 2025-03-19
Payer: MEDICAID

## 2025-03-19 LAB
BACTERIAL VAGINOSIS DNA: DETECTED
CANDIDA GLABRATA/KRUSEI: NOT DETECTED
CANDIDA RRNA VAG QL PROBE: NOT DETECTED
TRICHOMONAS VAGINALIS: NOT DETECTED

## 2025-03-19 RX ORDER — METRONIDAZOLE 7.5 MG/G
1 GEL VAGINAL 2 TIMES DAILY
Qty: 70 G | Refills: 0 | Status: SHIPPED | OUTPATIENT
Start: 2025-03-19

## 2025-04-03 ENCOUNTER — PROCEDURE VISIT (OUTPATIENT)
Dept: OBSTETRICS AND GYNECOLOGY | Facility: CLINIC | Age: 35
End: 2025-04-03
Attending: OBSTETRICS & GYNECOLOGY
Payer: MEDICAID

## 2025-04-03 VITALS
HEART RATE: 70 BPM | BODY MASS INDEX: 34.78 KG/M2 | SYSTOLIC BLOOD PRESSURE: 107 MMHG | DIASTOLIC BLOOD PRESSURE: 73 MMHG | WEIGHT: 184.06 LBS

## 2025-04-03 DIAGNOSIS — Z30.433 ENCOUNTER FOR REMOVAL AND REINSERTION OF INTRAUTERINE CONTRACEPTIVE DEVICE (IUD): Primary | ICD-10-CM

## 2025-04-03 LAB
B-HCG UR QL: NEGATIVE
CTP QC/QA: YES

## 2025-04-03 PROCEDURE — 64435 NJX AA&/STRD PARACRV NRV: CPT | Mod: PBBFAC | Performed by: OBSTETRICS & GYNECOLOGY

## 2025-04-03 PROCEDURE — 99999PBSHW POCT URINE PREGNANCY: Mod: PBBFAC,,,

## 2025-04-03 PROCEDURE — 81025 URINE PREGNANCY TEST: CPT | Mod: PBBFAC | Performed by: OBSTETRICS & GYNECOLOGY

## 2025-04-03 PROCEDURE — 99999PBSHW PR PBB SHADOW TECHNICAL ONLY FILED TO HB: Mod: PBBFAC,,,

## 2025-04-03 PROCEDURE — 58301 REMOVE INTRAUTERINE DEVICE: CPT | Mod: PBBFAC | Performed by: OBSTETRICS & GYNECOLOGY

## 2025-04-03 RX ADMIN — LEVONORGESTREL 1 INTRA UTERINE DEVICE: 52 INTRAUTERINE DEVICE INTRAUTERINE at 02:04

## 2025-04-03 NOTE — PROCEDURES
Removal and Insertion of Intrauterine Device    Date/Time: 4/3/2025 2:30 PM    Performed by: Adeline Boothe MD  Authorized by: Adeline Boothe MD    Consent:     Consent obtained:  Prior to procedure the appropriate consent was completed and verified    Consent given by:  Patient    Procedure risks and benefits discussed: yes      Patient questions answered: yes      Patient agrees, verbalizes understanding, and wants to proceed: yes     Device to be inserted was verified by patient: yes  Removal Procedure:    IUD grasped by: forceps   Removed with no complications: IUD removal not due to complications  Insertion Procedure:   1 Intra Uterine Device levonorgestreL 52 mg       Negative urine pregnancy test: yes      Cervix cleaned and prepped: yes      Speculum placed in vagina: yes      Tenaculum applied to cervix: yes      Uterus sounded: yes      Uterus sound depth (cm):  7    IUD inserted with no complications: yes      Strings trimmed: yes    Post-procedure:     Patient tolerated procedure well: yes      Patient will follow up after next period: yes      Paracervical block with 1% lidocaine with epi injected at 4 and 7 o'clock

## 2025-08-05 ENCOUNTER — OFFICE VISIT (OUTPATIENT)
Dept: OBSTETRICS AND GYNECOLOGY | Facility: CLINIC | Age: 35
End: 2025-08-05
Attending: OBSTETRICS & GYNECOLOGY
Payer: MEDICAID

## 2025-08-05 VITALS
HEIGHT: 61 IN | SYSTOLIC BLOOD PRESSURE: 114 MMHG | WEIGHT: 186.75 LBS | BODY MASS INDEX: 35.26 KG/M2 | DIASTOLIC BLOOD PRESSURE: 90 MMHG

## 2025-08-05 DIAGNOSIS — Z30.431 FAMILY PLANNING, IUD (INTRAUTERINE DEVICE) CHECK/REINSERTION/REMOVAL: Primary | ICD-10-CM

## 2025-08-05 PROCEDURE — 3080F DIAST BP >= 90 MM HG: CPT | Mod: CPTII,,, | Performed by: OBSTETRICS & GYNECOLOGY

## 2025-08-05 PROCEDURE — 3008F BODY MASS INDEX DOCD: CPT | Mod: CPTII,,, | Performed by: OBSTETRICS & GYNECOLOGY

## 2025-08-05 PROCEDURE — 1160F RVW MEDS BY RX/DR IN RCRD: CPT | Mod: CPTII,,, | Performed by: OBSTETRICS & GYNECOLOGY

## 2025-08-05 PROCEDURE — 99999 PR PBB SHADOW E&M-EST. PATIENT-LVL III: CPT | Mod: PBBFAC,,, | Performed by: OBSTETRICS & GYNECOLOGY

## 2025-08-05 PROCEDURE — 3074F SYST BP LT 130 MM HG: CPT | Mod: CPTII,,, | Performed by: OBSTETRICS & GYNECOLOGY

## 2025-08-05 PROCEDURE — 1159F MED LIST DOCD IN RCRD: CPT | Mod: CPTII,,, | Performed by: OBSTETRICS & GYNECOLOGY

## 2025-08-05 PROCEDURE — 99213 OFFICE O/P EST LOW 20 MIN: CPT | Mod: PBBFAC | Performed by: OBSTETRICS & GYNECOLOGY

## 2025-08-05 PROCEDURE — 99213 OFFICE O/P EST LOW 20 MIN: CPT | Mod: S$PBB,,, | Performed by: OBSTETRICS & GYNECOLOGY

## 2025-08-07 ENCOUNTER — PATIENT OUTREACH (OUTPATIENT)
Dept: ADMINISTRATIVE | Facility: OTHER | Age: 35
End: 2025-08-07
Payer: MEDICAID

## 2025-08-08 NOTE — PROGRESS NOTES
CHW - Initial Contact    This Community Health Worker completed OR updated the Social Determinant of Health questionnaire with patient during clinic visit today.    Pt identified barriers of most importance are: Food insecurities  Referrals to community agencies completed with patient/caregiver consent outside of [facility] include: none  Referrals were put through [facility] - no: none  Support and Services: none  Other information discussed the patient needs / wants help with: none   Follow up required: CHW Updated and Verfied via phone today Patient stated that she no longer having food insecurities she applied for [employer] and was approved patient denied any other needs at this time.Case closure at this time.  No future outreach task assigned..

## 2025-08-08 NOTE — PROGRESS NOTES
CHW - Case Closure    This Community Health Worker spoke to patient during clinic visit today.   Pt/Caregiver reported: 8/8/2025  Pt/Caregiver denied any additional needs at this time and agrees with episode closure at this time.  Provided patient with Community Health Worker's contact information and encouraged him/her to contact this Community Health Worker if additional needs arise.